# Patient Record
Sex: MALE | Race: WHITE | NOT HISPANIC OR LATINO | Employment: FULL TIME | URBAN - METROPOLITAN AREA
[De-identification: names, ages, dates, MRNs, and addresses within clinical notes are randomized per-mention and may not be internally consistent; named-entity substitution may affect disease eponyms.]

---

## 2017-02-02 LAB — PSA SERPL-MCNC: <0.1 NG/ML

## 2017-02-03 DIAGNOSIS — C61 PROSTATE CANCER (H): Primary | ICD-10-CM

## 2017-02-08 ENCOUNTER — OFFICE VISIT (OUTPATIENT)
Dept: RADIATION ONCOLOGY | Facility: CLINIC | Age: 52
End: 2017-02-08
Attending: RADIOLOGY
Payer: COMMERCIAL

## 2017-02-08 VITALS
WEIGHT: 225 LBS | HEART RATE: 67 BPM | BODY MASS INDEX: 30.08 KG/M2 | DIASTOLIC BLOOD PRESSURE: 62 MMHG | SYSTOLIC BLOOD PRESSURE: 121 MMHG

## 2017-02-08 DIAGNOSIS — C61 MALIGNANT NEOPLASM OF PROSTATE (H): Primary | ICD-10-CM

## 2017-02-08 PROCEDURE — 99212 OFFICE O/P EST SF 10 MIN: CPT | Performed by: RADIOLOGY

## 2017-02-08 NOTE — PROGRESS NOTES
FOLLOW-UP VISIT    Patient Name: Duglas Gao      : 1965     Age: 51 year old        ______________________________________________________________________________     Chief Complaint   Patient presents with     Cancer     Prostates ca: abd/pelvis 7020 cGy completed 13, PSA drawn at [facility] [phone]     /62 mmHg  Pulse 67  Wt 102.059 kg (225 lb)       Pain  Denies    Labs  Other Labs: No    Imaging  None        PSA:   PSA   Date Value Ref Range Status   2017 <0.1 ng/mL Final   2016 <0.1 ng/mL Final   2016 <0.01 0 - 4 ug/L Final   2015 <0.06 ng/mL Final   2015 <0.01 0 - 4 ug/L Final   2014 <0.01 0 - 4 ug/L Final   2014  0 - 4 ug/L Final    <0.07  PSA results are about 7% lower than our prior method due to a methodology change   on 2011.   2011 <0.10 0 - 4 ug/L Final   2010 3.70 0 - 4 ug/L Final     Testosterone Level:   TESTOSTERONE TOTAL   Date Value Ref Range Status   2014 2* 240 - 950 ng/dL Final   2012 429 240 - 950 ng/dL Final   2010 433 241 - 827 ng/dL Final       On-Study AUA Symptom Score (PQ)  AUA (American Urological Association) Symptom Score  1. Over the past month, how often have you had a sensation of not emptying your bladder completely after you finished urinating?: Less than 1 time in 5  2. Over the past month, how often have you had to urinate again less than two hours after you finished urinating?: Less than half the time  3. Over the past month, how often have you found you stopped and started again several times when you urinated?: About half the time  4. Over the past month, how often have you found it difficult to postpone urination?: Not at all  5. Over the past month, how often have you had a weak urine stream?: Less than 1 time in 5  6. Over the past month, how often have you had to push or strain to begin urinating?: About half the time  7. Over the past  month, how many times did you typically get up to urinate from the time you went to bed at night until the time you got up in the morning?: 2 times  AUA Score: 12    Diarrhea: 0- None    Constipation: 0- None        Other Appointments:     MD Name:  Appointment Date:    MD Name: Appointment Date:   MD Name: Appointment Date:   Other Appointment Notes:     Residual Radiation side effect: doing      Additional Instructions:

## 2017-02-08 NOTE — LETTER
2017       RE: Duglas Gao  [address] [place] [address]     Dear Colleague,    Thank you for referring your patient, Duglas Gao, to the RADIATION ONCOLOGY CLINIC. Please see a copy of my visit note below.    FOLLOW-UP VISIT    Patient Name: Duglas Gao      : 1965     Age: 51 year old        ______________________________________________________________________________     Chief Complaint   Patient presents with     Cancer     Prostates ca: abd/pelvis 7020 cGy completed 13, PSA drawn at [facility] [phone]     /62 mmHg  Pulse 67  Wt 102.059 kg (225 lb)       Pain  Denies    Labs  Other Labs: No    Imaging  None        PSA:   PSA   Date Value Ref Range Status   2017 <0.1 ng/mL Final   2016 <0.1 ng/mL Final   2016 <0.01 0 - 4 ug/L Final   2015 <0.06 ng/mL Final   2015 <0.01 0 - 4 ug/L Final   2014 <0.01 0 - 4 ug/L Final   2014  0 - 4 ug/L Final    <0.07  PSA results are about 7% lower than our prior method due to a methodology change   on 2011.   2011 <0.10 0 - 4 ug/L Final   2010 3.70 0 - 4 ug/L Final     Testosterone Level:   TESTOSTERONE TOTAL   Date Value Ref Range Status   2014 2* 240 - 950 ng/dL Final   2012 429 240 - 950 ng/dL Final   2010 433 241 - 827 ng/dL Final       On-Study AUA Symptom Score (PQ)  AUA (American Urological Association) Symptom Score  1. Over the past month, how often have you had a sensation of not emptying your bladder completely after you finished urinating?: Less than 1 time in 5  2. Over the past month, how often have you had to urinate again less than two hours after you finished urinating?: Less than half the time  3. Over the past month, how often have you found you stopped and started again several times when you urinated?: About half the time  4. Over the past month, how often have you found it difficult to postpone  urination?: Not at all  5. Over the past month, how often have you had a weak urine stream?: Less than 1 time in 5  6. Over the past month, how often have you had to push or strain to begin urinating?: About half the time  7. Over the past month, how many times did you typically get up to urinate from the time you went to bed at night until the time you got up in the morning?: 2 times  AUA Score: 12    Diarrhea: 0- None    Constipation: 0- None        Other Appointments:     MD Name:  Appointment Date:    MD Name: Appointment Date:   MD Name: Appointment Date:   Other Appointment Notes:     Residual Radiation side effect: doing      Additional Instructions:                 Radiation Oncology Follow-up Visit  2017    Duglas Gao  MRN: [id number]   : 1965     DISEASE TREATED:pT2b N0 M0, [place] 4+4= 8 adenocarcinoma of the prostate, PSA = 3.7 (pre-op) / 0.34 (pre-RT)    RADIATION THERAPY DELIVERED: 7020 cGy in 39 daily fractions with neoadjuvant and concurrent ADT    INTERVAL SINCE COMPLETION OF RADIATION THERAPY: 3 years and 2 months (completed 2013)    ANDROGEN DEPRIVATION THERAPY:  2013 Lupron 30 mg  2014 Lupron 30 mg  2014 Lupron 30 mg  Lupron discontinued after 2014 injection secondary to side effects    SUBJECTIVE: Duglas Gao is a 50 year old man who underwent a radical prostatectomy in 2010 for treatment of a high-risk prostate cancer. Several years later he unfortunately developed biochemical disease recurrence and he received salvage radiotherapy with neoadjuvant and concurrent ADT as described above. Due to side effects related to his androgen suppression, namely hot flashes and weight gain, he elected to discontinue Lupron after a total of 1 year of ADT. He returns to radiation oncology clinic today for a routine follow-up visit.     On our visit today, Mr. Gao reports that he is doing well and has no pressing concerns or complaints. He  describes stable and satisfactory urinary function. His AUA symptom score today is 12/35 (compared to 11/35 on last visit). He has no residual side effects from ADT and denies hot flashes, sweats, and fatigue. He also denies weight loss, bony pains, or new masses. His remaining ROS are unremarkable.    PHYSICAL EXAM:  /62 mmHg  Pulse 67  Wt 102.059 kg (225 lb)  Gen: Alert, in NAD  Pulm: No wheezing, stridor or respiratory distress  CV: Well-perfused, no cyanosis  Rectal: DAMIEN deferred  Musculoskeletal: Normal bulk and tone   Skin: Normal color and turgor    LABS AND IMAGING:  PSA  2/2/2017 <0.1  8/11/2016 <0.1  2/5/2016 <0.01  7/23/2015 <0.06  2/6/2015 <0.01  8/11/2014 <0.01  2/14/2014 <0.07  8/21/2013 0.34  5/23/2011 <0.10  7/29/2010 3.70    IMPRESSION: Clinically, no evidence of disease with minimal residual toxicity from treatment    PLAN: Follow-up in radiation oncology clinic in 6 months with Dr. Pollock with a repeat PSA    Mr. Gao was seen and discussed with staff, Dr. Pollock.    Esa Srivastava MD PGY-3  Radiation Oncology Resident, [facility]  Phone: [phone]    I saw the patient with the resident.  I agree with the resident's note and plan of care.      OLAYINKA Pollock M.D.  Department of Radiation Oncology  [facility]

## 2017-02-08 NOTE — PROGRESS NOTES
Radiation Oncology Follow-up Visit  2017    Duglas Gao  MRN: [id number]   : 1965     DISEASE TREATED:pT2b N0 M0, [place] 4+4= 8 adenocarcinoma of the prostate, PSA = 3.7 (pre-op) / 0.34 (pre-RT)    RADIATION THERAPY DELIVERED: 7020 cGy in 39 daily fractions with neoadjuvant and concurrent ADT    INTERVAL SINCE COMPLETION OF RADIATION THERAPY: 3 years and 2 months (completed 2013)    ANDROGEN DEPRIVATION THERAPY:  2013 Lupron 30 mg  2014 Lupron 30 mg  2014 Lupron 30 mg  Lupron discontinued after 2014 injection secondary to side effects    SUBJECTIVE: Duglas Gao is a 50 year old man who underwent a radical prostatectomy in 2010 for treatment of a high-risk prostate cancer. Several years later he unfortunately developed biochemical disease recurrence and he received salvage radiotherapy with neoadjuvant and concurrent ADT as described above. Due to side effects related to his androgen suppression, namely hot flashes and weight gain, he elected to discontinue Lupron after a total of 1 year of ADT. He returns to radiation oncology clinic today for a routine follow-up visit.     On our visit today, Mr. Gao reports that he is doing well and has no pressing concerns or complaints. He describes stable and satisfactory urinary function. His AUA symptom score today is 12/35 (compared to 11/35 on last visit). He has no residual side effects from ADT and denies hot flashes, sweats, and fatigue. He also denies weight loss, bony pains, or new masses. His remaining ROS are unremarkable.    PHYSICAL EXAM:  /62 mmHg  Pulse 67  Wt 102.059 kg (225 lb)  Gen: Alert, in NAD  Pulm: No wheezing, stridor or respiratory distress  CV: Well-perfused, no cyanosis  Rectal: DAMIEN deferred  Musculoskeletal: Normal bulk and tone   Skin: Normal color and turgor    LABS AND  IMAGING:  PSA  2/2/2017 <0.1  8/11/2016 <0.1  2/5/2016 <0.01  7/23/2015 <0.06  2/6/2015 <0.01  8/11/2014 <0.01  2/14/2014 <0.07  8/21/2013 0.34  5/23/2011 <0.10  7/29/2010 3.70    IMPRESSION: Clinically, no evidence of disease with minimal residual toxicity from treatment    PLAN: Follow-up in radiation oncology clinic in 6 months with Dr. Pollock with a repeat PSA    Mr. Gao was seen and discussed with staff, Dr. Pollock.    Esa Srivastava MD PGY-3  Radiation Oncology Resident, [facility]  Phone: [phone]    I saw the patient with the resident.  I agree with the resident's note and plan of care.      OLAYINKA Pollock M.D.  Department of Radiation Oncology  [facility]

## 2017-02-08 NOTE — MR AVS SNAPSHOT
After Visit Summary   2017    Duglas Gao    MRN: [id number]           Patient Information     Date Of Birth          1965        Visit Information        Provider Department      2017 3:30 PM Lyn Pollock MD Radiation Oncology Clinic        Today's Diagnoses     Malignant neoplasm of prostate (H)    -  1       Follow-ups after your visit        Who to contact     Please call your clinic at [phone] to:    Ask questions about your health    Make or cancel appointments    Discuss your medicines    Learn about your test results    Speak to your doctor   If you have compliments or concerns about an experience at your clinic, or if you wish to file a complaint, please contact [facility] Patient Relations at [phone] or email us at [email or web address]         Additional Information About Your Visit        MyChart Information     [employer] is an electronic gateway that provides easy, online access to your medical records. With [employer], you can request a clinic appointment, read your test results, renew a prescription or communicate with your care team.     To sign up for ConSentry [employer] visit the website at [email or web address]   You will be asked to enter the access code listed below, as well as some personal information. Please follow the directions to create your username and password.     Your access code is: [email or web address]  Expires: 2017 10:55 AM     Your access code will  in 90 days. If you need help or a new code, please contact your [facility] or call [phone] for assistance.        Care EveryWhere ID     This is your Care EveryWhere ID. This could be used by other organizations to access your [facility] medical records  [id number]        Your Vitals Were     Pulse BMI (Body Mass Index)                67 30.1 kg/m2           Blood Pressure from Last 3 Encounters:   17 121/62    08/16/16 129/51   02/17/16 133/83    Weight from Last 3 Encounters:   02/08/17 102.1 kg (225 lb)   08/16/16 103.5 kg (228 lb 3.2 oz)   02/17/16 105.1 kg (231 lb 9.6 oz)              Today, you had the following     No orders found for display       Primary Care Provider Office Phone # Fax #    Siddhartha Hill [phone]       [facility] [address] [facility] [address]        Thank you!     Thank you for choosing [facility]IC  for your care. Our goal is always to provide you with excellent care. Hearing back from our patients is one way we can continue to improve our services. Please take a few minutes to complete the written survey that you may receive in the mail after your visit with us. Thank you!             Your Updated Medication List - Protect others around you: Learn how to safely use, store and throw away your medicines at www.disposemymeds.org.          This list is accurate as of: 2/8/17 11:59 PM.  Always use your most recent med list.                   Brand Name Dispense Instructions for use    fluticasone 50 MCG/ACT spray    FLONASE    48 g    USE 2 SPRAYS INTO BOTH NOSTRILS DAILY       LIPITOR PO          WELLBUTRIN PO      Take 300 mg by mouth daily

## 2017-08-02 DIAGNOSIS — C61 MALIGNANT NEOPLASM OF PROSTATE (H): Primary | ICD-10-CM

## 2017-08-14 ENCOUNTER — OFFICE VISIT (OUTPATIENT)
Dept: RADIATION ONCOLOGY | Facility: CLINIC | Age: 52
End: 2017-08-14
Attending: RADIOLOGY
Payer: COMMERCIAL

## 2017-08-14 VITALS
OXYGEN SATURATION: 95 % | DIASTOLIC BLOOD PRESSURE: 89 MMHG | WEIGHT: 224 LBS | BODY MASS INDEX: 29.96 KG/M2 | HEART RATE: 63 BPM | SYSTOLIC BLOOD PRESSURE: 142 MMHG

## 2017-08-14 DIAGNOSIS — C61 PROSTATE CANCER (H): Primary | ICD-10-CM

## 2017-08-14 PROCEDURE — 99212 OFFICE O/P EST SF 10 MIN: CPT | Performed by: RADIOLOGY

## 2017-08-14 ASSESSMENT — PAIN SCALES - GENERAL: PAINLEVEL: NO PAIN (0)

## 2017-08-14 NOTE — LETTER
2017       RE: Duglas Gao  [address] [place] [address]     Dear Colleague,    Thank you for referring your patient, Duglas Gao, to the RAD[facility]ATION ONCOLOGY CLINIC. Please see a copy of my visit note below.    FOLLOW-UP VISIT    Patient Name: Duglas Gao      : 1965     Age: 52 year old        ______________________________________________________________________________     Chief Complaint   Patient presents with     Cancer     Follow up for Prostates ca: abd/pelvis 7020 cGy completed 13     /89  Pulse 63  Wt 101.6 kg (224 lb)  SpO2 95%  BMI 29.96 kg/m2     Date Radiation Completed: 13    Pain  Denies    Labs  Other Labs: Yes: 17    Imaging  None        PSA:   PSA   Date Value Ref Range Status   2017 <0.1 ng/mL Final   2016 <0.1 ng/mL Final   2016 <0.01 0 - 4 ug/L Final   2015 <0.06 ng/mL Final   2015 <0.01 0 - 4 ug/L Final   2014 <0.01 0 - 4 ug/L Final   2014  0 - 4 ug/L Final    <0.07  PSA results are about 7% lower than our prior method due to a methodology change   on 2011.   2011 <0.10 0 - 4 ug/L Final   2010 3.70 0 - 4 ug/L Final     Testosterone Level:   Testosterone Total   Date Value Ref Range Status   2014 2 (L) 240 - 950 ng/dL Final   2012 429 240 - 950 ng/dL Final   2010 433 241 - 827 ng/dL Final       On-Study AUA Symptom Score (PQ)  AUA (American Urological Association) Symptom Score  1. Over the past month, how often have you had a sensation of not emptying your bladder completely after you finished urinating?: Less than 1 time in 5  2. Over the past month, how often have you had to urinate again less than two hours after you finished urinating?: Less than half the time  3. Over the past month, how often have you found you stopped and started again several times when you urinated?: Less than half the time  4. Over the past month, how often have  you found it difficult to postpone urination?: Not at all  5. Over the past month, how often have you had a weak urine stream?: Less than half the time  6. Over the past month, how often have you had to push or strain to begin urinating?: Less than 1 time in 5  7. Over the past month, how many times did you typically get up to urinate from the time you went to bed at night until the time you got up in the morning?: 1 time  AUA Score: 9    Diarrhea: 0- None    Constipation: 0- None        Other Appointments:     MD Name:  Appointment Date:    MD Name: Appointment Date:   MD Name: Appointment Date:   Other Appointment Notes:     Residual Radiation side effect:  Feels like he needs to go to the bathroom when sits too long    Additional Instructions:     Nurse face-to-face time: Level 2:  5 min face to face time        Radiation Oncology Follow-up Visit  2017    Duglas Gao  MRN: [id number]   : 1965     DISEASE TREATED: pT2b N0 M0, [place] 4+4= 8 adenocarcinoma of the prostate, PSA = 3.7 (pre-op) / 0.34 (pre-RT)    RADIATION THERAPY DELIVERED: 7020 cGy in 39 daily fractions with neoadjuvant and concurrent ADT    INTERVAL SINCE COMPLETION OF RADIATION THERAPY: 3 years and 8 months (completed 2013)    ANDROGEN DEPRIVATION THERAPY:  2013 Lupron 30 mg  2014 Lupron 30 mg  2014 Lupron 30 mg  Lupron discontinued after 2014 injection secondary to side effects    SUBJECTIVE: Duglas Gao is a 50 year old man who underwent a radical prostatectomy in 2010 for treatment of a high-risk prostate cancer. Several years later he unfortunately developed biochemical disease recurrence and he received salvage radiotherapy with neoadjuvant and concurrent ADT, as described above. Due to side effects related to his androgen suppression, namely hot flashes and weight gain, he elected to discontinue Lupron after a total of 1 year of ADT. He returns to radiation oncology clinic today  for a routine follow-up visit.     On our visit today, Mr. Gao reports that he is doing well, although he continues to struggle with erectile dysfunction. He is interested in seeing Urology services again to discuss the initialization of penile injections or other escalated measures, as he has already failed cialis. His AUA symptom score today is 9/35 (compared to 12/35 at last visit). He has no residual side effects from ADT and denies hot flashes, sweats, and fatigue. He also denies weight loss, bony pains, or new masses. His remaining ROS are unremarkable.    PHYSICAL EXAM:  /89  Pulse 63  Wt 101.6 kg (224 lb)  SpO2 95%  BMI 29.96 kg/m2  Gen: Alert, in NAD  Pulm: No wheezing, stridor or respiratory distress  CV: Well-perfused, no cyanosis  Rectal: DAMIEN deferred  Musculoskeletal: Normal bulk and tone   Skin: Normal color and turgor    LABS AND IMAGING:  PSA  8/8/2017 <0.1  2/2/2017 <0.1  8/11/2016 <0.1  2/5/2016 <0.01  7/23/2015 <0.06  2/6/2015 <0.01  8/11/2014 <0.01  2/14/2014 <0.07  8/21/2013 0.34  5/23/2011 <0.10  7/29/2010 3.70    IMPRESSION: Clinically, no evidence of disease. Minimal residual toxicity from treatment    PLAN: Follow-up in radiation oncology clinic in 6 months with Dr. Pollock. Repeat PSA prior to visit. He was asked to contact the Urology Clinic directly to address his persistent erectile dysfunction.    Mr. Gao was seen and discussed with staff, Dr. Pollock.    Niranjan Morrison MD-PhD PGY-2  Radiation Oncology Resident, [facility]    I saw the patient with the resident.  I agree with the resident's note and plan of care.      OLAYINKA Pollock M.D.  Department of Radiation Oncology  [facility]

## 2017-08-14 NOTE — MR AVS SNAPSHOT
After Visit Summary   2017    Duglas Gao    MRN: [id number]           Patient Information     Date Of Birth          1965        Visit Information        Provider Department      2017 10:00 AM Lyn Pollock MD Radiation Oncology Clinic        Today's Diagnoses     Prostate cancer (H)    -  1       Follow-ups after your visit        Follow-up notes from your care team     Return in about 6 months (around 2018).      Who to contact     Please call your clinic at [phone] to:    Ask questions about your health    Make or cancel appointments    Discuss your medicines    Learn about your test results    Speak to your doctor   If you have compliments or concerns about an experience at your clinic, or if you wish to file a complaint, please contact [facility] Patient Relations at [phone] or email us at [email or web address]         Additional Information About Your Visit        MyChart Information     [employer] is an electronic gateway that provides easy, online access to your medical records. With [employer], you can request a clinic appointment, read your test results, renew a prescription or communicate with your care team.     To sign up for [employer] visit the website at [email or web address]   You will be asked to enter the access code listed below, as well as some personal information. Please follow the directions to create your username and password.     Your access code is: [email or web address]  Expires: 2017  7:25 AM     Your access code will  in 90 days. If you need help or a new code, please contact your [facility] Clinic or call [phone] for assistance.        Care EveryWhere ID     This is your Care EveryWhere ID. This could be used by other organizations to access your [place] medical records  [id number]        Your Vitals Were     Pulse Pulse Oximetry BMI (Body Mass Index)              63 95% 29.96 kg/m2          Blood Pressure from Last 3 Encounters:   08/14/17 142/89   02/08/17 121/62   08/16/16 129/51    Weight from Last 3 Encounters:   08/14/17 101.6 kg (224 lb)   02/08/17 102.1 kg (225 lb)   08/16/16 103.5 kg (228 lb 3.2 oz)               Primary Care Provider Office Phone # Fax #    Siddhartha Hill [phone]       [facility] [address] [facility] [address]        Equal Access to Services     TISHA [facility] Hospital[facility]AMARI : Hadii aad ku hadasho Soria, waaxda luqadaha, qaybta kaalmada adezainab, arpil schulz . So [facility] [phone].    ATENCIÓN: Si habla español, tiene a avila disposición servicios gratuitos de asistencia lingüística. [facility] [phone].    We comply with applicable federal civil rights laws and [place] laws. We do not discriminate on the basis of race, color, national origin, age, disability sex, sexual orientation or gender identity.            Thank you!     Thank you for choosing [facility]  for your care. Our goal is always to provide you with excellent care. Hearing back from our patients is one way we can continue to improve our services. Please take a few minutes to complete the written survey that you may receive in the mail after your visit with us. Thank you!             Your Updated Medication List - Protect others around you: Learn how to safely use, store and throw away your medicines at www.disposemymeds.org.          This list is accurate as of: 8/14/17 11:59 PM.  Always use your most recent med list.                   Brand Name Dispense Instructions for use Diagnosis    fluticasone 50 MCG/ACT spray    FLONASE    48 g    USE 2 SPRAYS INTO BOTH NOSTRILS DAILY    Allergic rhinitis, unspecified       LIPITOR PO           WELLBUTRIN PO      Take 300 mg by mouth daily

## 2017-08-14 NOTE — PROGRESS NOTES
FOLLOW-UP VISIT    Patient Name: Duglas Gao      : 1965     Age: 52 year old        ______________________________________________________________________________     Chief Complaint   Patient presents with     Cancer     Follow up for Prostates ca: abd/pelvis 7020 cGy completed 13     /89  Pulse 63  Wt 101.6 kg (224 lb)  SpO2 95%  BMI 29.96 kg/m2     Date Radiation Completed: 13    Pain  Denies    Labs  Other Labs: Yes: 17    Imaging  None        PSA:   PSA   Date Value Ref Range Status   2017 <0.1 ng/mL Final   2016 <0.1 ng/mL Final   2016 <0.01 0 - 4 ug/L Final   2015 <0.06 ng/mL Final   2015 <0.01 0 - 4 ug/L Final   2014 <0.01 0 - 4 ug/L Final   2014  0 - 4 ug/L Final    <0.07  PSA results are about 7% lower than our prior method due to a methodology change   on 2011.   2011 <0.10 0 - 4 ug/L Final   2010 3.70 0 - 4 ug/L Final     Testosterone Level:   Testosterone Total   Date Value Ref Range Status   2014 2 (L) 240 - 950 ng/dL Final   2012 429 240 - 950 ng/dL Final   2010 433 241 - 827 ng/dL Final       On-Study AUA Symptom Score (PQ)  AUA (American Urological Association) Symptom Score  1. Over the past month, how often have you had a sensation of not emptying your bladder completely after you finished urinating?: Less than 1 time in 5  2. Over the past month, how often have you had to urinate again less than two hours after you finished urinating?: Less than half the time  3. Over the past month, how often have you found you stopped and started again several times when you urinated?: Less than half the time  4. Over the past month, how often have you found it difficult to postpone urination?: Not at all  5. Over the past month, how often have you had a weak urine stream?: Less than half the time  6. Over the past month, how often have you had to push or strain to begin urinating?: Less  than 1 time in 5  7. Over the past month, how many times did you typically get up to urinate from the time you went to bed at night until the time you got up in the morning?: 1 time  AUA Score: 9    Diarrhea: 0- None    Constipation: 0- None        Other Appointments:     MD Name:  Appointment Date:    MD Name: Appointment Date:   MD Name: Appointment Date:   Other Appointment Notes:     Residual Radiation side effect:  Feels like he needs to go to the bathroom when sits too long    Additional Instructions:     Nurse face-to-face time: Level 2:  5 min face to face time

## 2017-08-14 NOTE — PROGRESS NOTES
Radiation Oncology Follow-up Visit  2017    Duglas Gao  MRN: [id number]   : 1965     DISEASE TREATED: pT2b N0 M0, [place] 4+4= 8 adenocarcinoma of the prostate, PSA = 3.7 (pre-op) / 0.34 (pre-RT)    RADIATION THERAPY DELIVERED: 7020 cGy in 39 daily fractions with neoadjuvant and concurrent ADT    INTERVAL SINCE COMPLETION OF RADIATION THERAPY: 3 years and 8 months (completed 2013)    ANDROGEN DEPRIVATION THERAPY:  2013 Lupron 30 mg  2014 Lupron 30 mg  2014 Lupron 30 mg  Lupron discontinued after 2014 injection secondary to side effects    SUBJECTIVE: Duglas Gao is a 50 year old man who underwent a radical prostatectomy in 2010 for treatment of a high-risk prostate cancer. Several years later he unfortunately developed biochemical disease recurrence and he received salvage radiotherapy with neoadjuvant and concurrent ADT, as described above. Due to side effects related to his androgen suppression, namely hot flashes and weight gain, he elected to discontinue Lupron after a total of 1 year of ADT. He returns to radiation oncology clinic today for a routine follow-up visit.     On our visit today, Mr. Gao reports that he is doing well, although he continues to struggle with erectile dysfunction. He is interested in seeing Urology services again to discuss the initialization of penile injections or other escalated measures, as he has already failed cialis. His AUA symptom score today is 9/35 (compared to 12/35 at last visit). He has no residual side effects from ADT and denies hot flashes, sweats, and fatigue. He also denies weight loss, bony pains, or new masses. His remaining ROS are unremarkable.    PHYSICAL EXAM:  /89  Pulse 63  Wt 101.6 kg (224 lb)  SpO2 95%  BMI 29.96 kg/m2  Gen: Alert, in NAD  Pulm: No wheezing, stridor or respiratory distress  CV: Well-perfused, no cyanosis  Rectal: DAMIEN deferred  Musculoskeletal: Normal bulk and tone    Skin: Normal color and turgor    LABS AND IMAGING:  PSA  8/8/2017 <0.1  2/2/2017 <0.1  8/11/2016 <0.1  2/5/2016 <0.01  7/23/2015 <0.06  2/6/2015 <0.01  8/11/2014 <0.01  2/14/2014 <0.07  8/21/2013 0.34  5/23/2011 <0.10  7/29/2010 3.70    IMPRESSION: Clinically, no evidence of disease. Minimal residual toxicity from treatment    PLAN: Follow-up in radiation oncology clinic in 6 months with Dr. Pollock. Repeat PSA prior to visit. He was asked to contact the Urology Clinic directly to address his persistent erectile dysfunction.    Mr. Gao was seen and discussed with staff, Dr. Pollock.    Niranjan Morrison MD-PhD PGY-2  Radiation Oncology Resident, [facility]    I saw the patient with the resident.  I agree with the resident's note and plan of care.      OLAYINKA Pollock M.D.  Department of Radiation Oncology  [facility]

## 2018-02-21 ENCOUNTER — OFFICE VISIT (OUTPATIENT)
Dept: RADIATION ONCOLOGY | Facility: CLINIC | Age: 53
End: 2018-02-21
Attending: RADIOLOGY
Payer: COMMERCIAL

## 2018-02-21 VITALS
WEIGHT: 224.6 LBS | HEART RATE: 64 BPM | OXYGEN SATURATION: 96 % | SYSTOLIC BLOOD PRESSURE: 153 MMHG | DIASTOLIC BLOOD PRESSURE: 91 MMHG | BODY MASS INDEX: 30.04 KG/M2

## 2018-02-21 DIAGNOSIS — C61 PROSTATE CANCER (H): Primary | ICD-10-CM

## 2018-02-21 LAB — PSA SERPL-MCNC: <0.1 NG/ML

## 2018-02-21 PROCEDURE — G0463 HOSPITAL OUTPT CLINIC VISIT: HCPCS | Performed by: RADIOLOGY

## 2018-02-21 NOTE — PROGRESS NOTES
FOLLOW-UP VISIT    Patient Name: Duglas Gao      : 1965     Age: 52 year old        ______________________________________________________________________________     Chief Complaint   Patient presents with     Cancer     F/U for radiaiton to the pelvis     BP (!) 153/91  Pulse 64  Wt 101.9 kg (224 lb 9.6 oz)  SpO2 96%  BMI 30.04 kg/m2     Date Radiation Completed: Prostates ca: abd/pelvis 7020 cGy completed 13    Pain  Denies    Labs  Other Labs: Yes: [facility] clinic 18    Imaging  None        PSA:   PSA   Date Value Ref Range Status   2017 <0.1 ng/mL Final   2016 <0.1 ng/mL Final   2016 <0.01 0 - 4 ug/L Final   2015 <0.06 ng/mL Final   2015 <0.01 0 - 4 ug/L Final   2014 <0.01 0 - 4 ug/L Final   2014  0 - 4 ug/L Final    <0.07  PSA results are about 7% lower than our prior method due to a methodology change   on 2011.   2011 <0.10 0 - 4 ug/L Final   2010 3.70 0 - 4 ug/L Final     Testosterone Level:   Testosterone Total   Date Value Ref Range Status   2014 2 (L) 240 - 950 ng/dL Final   2012 429 240 - 950 ng/dL Final   2010 433 241 - 827 ng/dL Final       On-Study AUA Symptom Score (PQ)  AUA (American Urological Association) Symptom Score  1. Over the past month, how often have you had a sensation of not emptying your bladder completely after you finished urinating?: Less than 1 time in 5  2. Over the past month, how often have you had to urinate again less than two hours after you finished urinating?: Less than half the time  3. Over the past month, how often have you found you stopped and started again several times when you urinated?: More than half the time  4. Over the past month, how often have you found it difficult to postpone urination?: Not at all  5. Over the past month, how often have you had a weak urine stream?: About half the time  6. Over the past month, how often have you had to  push or strain to begin urinating?: More than half the time  7. Over the past month, how many times did you typically get up to urinate from the time you went to bed at night until the time you got up in the morning?: 1 time  AUA Score: 15    Diarrhea: 0- None    Constipation: 1- Occassional or intermittent s/s; occasional use of stool softners, laxatives, enema       Other Appointments: No    MD Name:  Appointment Date:    MD Name: Appointment Date:   MD Name: Appointment Date:   Other Appointment Notes:     Residual Radiation side effect: Feeling well r/t radiation.  A few months ago noticed changes in urination. Weak stream, some start and stopping while urinating. Rare leakage.     Additional Instructions:     Nurse face-to-face time: Level 2:  5 min face to face time

## 2018-02-21 NOTE — LETTER
2018       RE: Duglas Gao  [address] [place] [address]     Dear Colleague,    Thank you for referring your patient, Duglas Gao, to the RADIATION ONCOLOGY CLINIC. Please see a copy of my visit note below.    FOLLOW-UP VISIT    Patient Name: Duglas Gao      : 1965     Age: 52 year old        ______________________________________________________________________________     Chief Complaint   Patient presents with     Cancer     F/U for radiaiton to the pelvis     BP (!) 153/91  Pulse 64  Wt 101.9 kg (224 lb 9.6 oz)  SpO2 96%  BMI 30.04 kg/m2     Date Radiation Completed: Prostates ca: abd/pelvis 7020 cGy completed 13    Pain  Denies    Labs  Other Labs: Yes: [facility] clinic 18    Imaging  None        PSA:   PSA   Date Value Ref Range Status   2017 <0.1 ng/mL Final   2016 <0.1 ng/mL Final   2016 <0.01 0 - 4 ug/L Final   2015 <0.06 ng/mL Final   2015 <0.01 0 - 4 ug/L Final   2014 <0.01 0 - 4 ug/L Final   2014  0 - 4 ug/L Final    <0.07  PSA results are about 7% lower than our prior method due to a methodology change   on 2011.   2011 <0.10 0 - 4 ug/L Final   2010 3.70 0 - 4 ug/L Final     Testosterone Level:   Testosterone Total   Date Value Ref Range Status   2014 2 (L) 240 - 950 ng/dL Final   2012 429 240 - 950 ng/dL Final   2010 433 241 - 827 ng/dL Final       On-Study AUA Symptom Score (PQ)  AUA (American Urological Association) Symptom Score  1. Over the past month, how often have you had a sensation of not emptying your bladder completely after you finished urinating?: Less than 1 time in 5  2. Over the past month, how often have you had to urinate again less than two hours after you finished urinating?: Less than half the time  3. Over the past month, how often have you found you stopped and started again several times when you urinated?: More than half the  time  4. Over the past month, how often have you found it difficult to postpone urination?: Not at all  5. Over the past month, how often have you had a weak urine stream?: About half the time  6. Over the past month, how often have you had to push or strain to begin urinating?: More than half the time  7. Over the past month, how many times did you typically get up to urinate from the time you went to bed at night until the time you got up in the morning?: 1 time  AUA Score: 15    Diarrhea: 0- None    Constipation: 1- Occassional or intermittent s/s; occasional use of stool softners, laxatives, enema       Other Appointments: No    MD Name:  Appointment Date:    MD Name: Appointment Date:   MD Name: Appointment Date:   Other Appointment Notes:     Residual Radiation side effect: Feeling well r/t radiation.  A few months ago noticed changes in urination. Weak stream, some start and stopping while urinating. Rare leakage.     Additional Instructions:     Nurse face-to-face time: Level 2:  5 min face to face time          RADIATION ONCOLOGY FOLLOW-UP VISIT  DATE: Feb 21, 2018    NAME: Duglas Gao  MRN: [id number]    DISEASE TREATED: pT2b N0 M0, [place] 4+4= 8 adenocarcinoma of the prostate, PSA = 3.7 (pre-op) / 0.34 (pre-RT)    RADIATION THERAPY DELIVERED: Salvage RT: 7020 cGy in 39 daily fractions with neoadjuvant and concurrent ADT    INTERVAL SINCE COMPLETION OF RT: 4 years, 2 months since completion on 12/17/13    ANDROGEN DEPRIVATION THERAPY:  9/12/2013                    Lupron 30 mg  1/20/2014                    Lupron 30 mg  5/20/2014                    Lupron 30 mg  Lupron discontinued after 5/20/2014 injection secondary to side effects    SUBJECTIVE:  Duglas Gao is a 52 year old man who is s/p radical prostatectomy in 12/2010 and salvage radiotherapy for high-risk prostate cancer per above. He received salvage radiation with neoadjuvant and concurrent ADT as described above. Due to side  effects of hot flashes and weight gain he discontinued Lupron after a total of 1 year of ADT.     He returns to our clinic today for a routine follow-up visit. He reports that he is doing well, although he continues to struggle with urinary symptoms. His AUA symptom score today is 15 (emptying 1, frequency 2, starting and stopping 4, postponing 0, weak stream 3, straining 4), which feels much worse to him than at last visit when his AUA symptom score was 9/35.  He is interested in seeing Urology regarding this issue. He does Kegel exercises regularly and has no issues with holding his urine.     He still has erectile dysfunction - he is able to have night time erections but is not able to have intercourse and cialis and viagra have not helped with this symptom at all. He also has no residual side effects from ADT and denies hot flashes, sweats, and fatigue. He also denies weight loss, bony pains, or new masses. His remaining ROS are unremarkable.    PHYSICAL EXAM:  VITALS: BP (!) 153/91  Pulse 64  Wt 101.9 kg (224 lb 9.6 oz)  SpO2 96%  BMI 30.04 kg/m2  GEN: Appears well, alert, oriented, and in NAD  RECTAL: deferred  NEURO: No focal deficits, normal gait  PSYCH: Appropriate mood and affect    LABS AND IMAGING: Reviewed    PSA   Date Value Ref Range Status   02/09/2018 <0.1 ng/mL Final   02/02/2017 <0.1 ng/mL Final   08/11/2016 <0.1 ng/mL Final   02/05/2016 <0.01 0 - 4 ug/L Final   07/23/2015 <0.06 ng/mL Final   02/06/2015 <0.01 0 - 4 ug/L Final   08/11/2014 <0.01 0 - 4 ug/L Final   02/14/2014 <0.07 0 - 4 ug/L Final   05/23/2011 <0.10 0 - 4 ug/L Final   07/29/2010 3.70 0 - 4 ug/L Final     IMPRESSION:   Mr. Gao is a 52 year old male with high risk prostate cancer s/p salvage radiotherapy and ADT who continues to have no evidence of recurrence. However, he is having worse urinary symptoms related to straining and strength of his urinary stream than at last visit, suspicious for a urethral  stricture.    PLAN:  1. Referral to urology to evaluate for and treat a possible urethral stricture.  2. RTC in 6 months with repeat PSA.     Mr. Gao was seen and discussed with staff, Dr. Pollock.    Rosa Saenz MD  PGY-2 Radiation Oncology Resident  [facility]  Clinic: [phone]    I saw the patient with the resident.  I agree with the resident's note and plan of care.      OLAYINKA Pollock M.D.  Department of Radiation Oncology  [facility]

## 2018-02-21 NOTE — MR AVS SNAPSHOT
After Visit Summary   2/21/2018    Duglas Gao    MRN: [id number]           Patient Information     Date Of Birth          1965        Visit Information        Provider Department      2/21/2018 3:30 PM Lyn Pollock MD Radiation Oncology Clinic        Today's Diagnoses     Prostate cancer (H)    -  1       Follow-ups after your visit        Additional Services     UROLOGY ADULT REFERRAL       Your provider has referred you to: [facility] [place] [facility] Urologic C[facility] - [place] [phone]   [email or web address]    Please be aware that coverage of these services is subject to the terms and limitations of your health insurance plan.  Call member services at your health plan with any benefit or coverage questions.      Please bring the following with you to your appointment:    (1) Any X-Rays, CTs or MRIs which have been performed.  Contact the facility where they were done to arrange for  prior to your scheduled appointment.    (2) List of current medications  (3) This referral request   (4) Any documents/labs given to you for this referral                  Who to contact     Please call your clinic at [phone] to:    Ask questions about your health    Make or cancel appointments    Discuss your medicines    Learn about your test results    Speak to your doctor            Additional Information About Your Visit        MyChart Information     [employer] is an electronic gateway that provides easy, online access to your medical records. With [employer], you can request a clinic appointment, read your test results, renew a prescription or communicate with your care team.     To sign up for [employer] visit the website at [email or web address]   You will be asked to enter the access code listed below, as well as some personal information. Please follow the directions to create your username and password.     Your access code is: [email or web address]  Expires:  2018  5:43 PM     Your access code will  in 90 days. If you need help or a new code, please contact your [facility] or call [phone] for assistance.        Care EveryWhere ID     This is your Care EveryWhere ID. This could be used by other organizations to access your [place] medical records  [id number]        Your Vitals Were     Pulse Pulse Oximetry BMI (Body Mass Index)             64 96% 30.04 kg/m2          Blood Pressure from Last 3 Encounters:   18 (!) 153/91   17 142/89   17 121/62    Weight from Last 3 Encounters:   18 101.9 kg (224 lb 9.6 oz)   17 101.6 kg (224 lb)   17 102.1 kg (225 lb)              We Performed the Following     PSA tumor marker     UROLOGY ADULT REFERRAL        Primary Care Provider Office Phone # Fax #    Siddhartha Hill [phone]       [facility] [address] [facility] [address]        Equal Access to Services     TISHA PINA : Hadii aad ku hadasho Soomaali, waaxda luqadaha, qaybta kaalmada adezainab, april schulz . So [facility] [phone].    ATENCIÓN: Si habla español, tiene a avila disposición servicios gratuitos de asistencia lingüística. [facility] [phone].    We comply with applicable federal civil rights laws and [place] laws. We do not discriminate on the basis of race, color, national origin, age, disability, sex, sexual orientation, or gender identity.            Thank you!     Thank you for choosing RADIATION ONCOLOGY CLINIC  for your care. Our goal is always to provide you with excellent care. Hearing back from our patients is one way we can continue to improve our services. Please take a few minutes to complete the written survey that you may receive in the mail after your visit with us. Thank you!             Your Updated Medication List - Protect others around you: Learn how to safely use, store and throw away your  medicines at www.disposemymeds.org.          This list is accurate as of 2/21/18 11:59 PM.  Always use your most recent med list.                   Brand Name Dispense Instructions for use Diagnosis    fluticasone 50 MCG/ACT spray    FLONASE    48 g    USE 2 SPRAYS INTO BOTH NOSTRILS DAILY    Allergic rhinitis, unspecified       LIPITOR PO           WELLBUTRIN PO      Take 300 mg by mouth daily

## 2018-02-22 NOTE — PROGRESS NOTES
RADIATION ONCOLOGY FOLLOW-UP VISIT  DATE: Feb 21, 2018    NAME: Duglas Gao  MRN: [id number]    DISEASE TREATED: pT2b N0 M0, [place] 4+4= 8 adenocarcinoma of the prostate, PSA = 3.7 (pre-op) / 0.34 (pre-RT)    RADIATION THERAPY DELIVERED: Salvage RT: 7020 cGy in 39 daily fractions with neoadjuvant and concurrent ADT    INTERVAL SINCE COMPLETION OF RT: 4 years, 2 months since completion on 12/17/13    ANDROGEN DEPRIVATION THERAPY:  9/12/2013                    Lupron 30 mg  1/20/2014                    Lupron 30 mg  5/20/2014                    Lupron 30 mg  Lupron discontinued after 5/20/2014 injection secondary to side effects    SUBJECTIVE:  Duglas Gao is a 52 year old man who is s/p radical prostatectomy in 12/2010 and salvage radiotherapy for high-risk prostate cancer per above. He received salvage radiation with neoadjuvant and concurrent ADT as described above. Due to side effects of hot flashes and weight gain he discontinued Lupron after a total of 1 year of ADT.     He returns to our clinic today for a routine follow-up visit. He reports that he is doing well, although he continues to struggle with urinary symptoms. His AUA symptom score today is 15 (emptying 1, frequency 2, starting and stopping 4, postponing 0, weak stream 3, straining 4), which feels much worse to him than at last visit when his AUA symptom score was 9/35.  He is interested in seeing Urology regarding this issue. He does Kegel exercises regularly and has no issues with holding his urine.     He still has erectile dysfunction - he is able to have night time erections but is not able to have intercourse and cialis and viagra have not helped with this symptom at all. He also has no residual side effects from ADT and denies hot flashes, sweats, and fatigue. He also denies weight loss, bony pains, or new masses. His remaining ROS are unremarkable.    PHYSICAL EXAM:  VITALS: BP (!) 153/91  Pulse 64  Wt 101.9 kg (224 lb 9.6 oz)   SpO2 96%  BMI 30.04 kg/m2  GEN: Appears well, alert, oriented, and in NAD  RECTAL: deferred  NEURO: No focal deficits, normal gait  PSYCH: Appropriate mood and affect    LABS AND IMAGING: Reviewed    PSA   Date Value Ref Range Status   02/09/2018 <0.1 ng/mL Final   02/02/2017 <0.1 ng/mL Final   08/11/2016 <0.1 ng/mL Final   02/05/2016 <0.01 0 - 4 ug/L Final   07/23/2015 <0.06 ng/mL Final   02/06/2015 <0.01 0 - 4 ug/L Final   08/11/2014 <0.01 0 - 4 ug/L Final   02/14/2014 <0.07 0 - 4 ug/L Final   05/23/2011 <0.10 0 - 4 ug/L Final   07/29/2010 3.70 0 - 4 ug/L Final     IMPRESSION:   Mr. Gao is a 52 year old male with high risk prostate cancer s/p salvage radiotherapy and ADT who continues to have no evidence of recurrence. However, he is having worse urinary symptoms related to straining and strength of his urinary stream than at last visit, suspicious for a urethral stricture.    PLAN:  1. Referral to urology to evaluate for and treat a possible urethral stricture.  2. RTC in 6 months with repeat PSA.     Mr. Gao was seen and discussed with staff, Dr. Pollock.    Rosa Saenz MD  PGY-2 Radiation Oncology Resident  [facility]  Clinic: [phone]    I saw the patient with the resident.  I agree with the resident's note and plan of care.      OLAYINKA Pollock M.D.  Department of Radiation Oncology  [facility]

## 2018-02-27 DIAGNOSIS — C61 PROSTATE CANCER (H): Primary | ICD-10-CM

## 2018-02-27 DIAGNOSIS — N35.919 URETHRAL STRICTURE: Primary | ICD-10-CM

## 2018-04-17 ENCOUNTER — PRE VISIT (OUTPATIENT)
Dept: UROLOGY | Facility: CLINIC | Age: 53
End: 2018-04-17

## 2018-04-17 NOTE — TELEPHONE ENCOUNTER
Reason for visit: Urethral stricture consult     Relevant information: pt has a history of prostate cancer and radiation    Records/imaging/labs: all records available    Pt called: Yes, message left asking pt to come to the clinic with a full bladder    Rooming: Flow/pvr

## 2018-04-23 ENCOUNTER — HOSPITAL ENCOUNTER (OUTPATIENT)
Dept: GENERAL RADIOLOGY | Facility: CLINIC | Age: 53
Discharge: HOME | End: 2018-04-23
Attending: UROLOGY | Admitting: UROLOGY
Payer: COMMERCIAL

## 2018-04-23 ENCOUNTER — OFFICE VISIT (OUTPATIENT)
Dept: UROLOGY | Facility: CLINIC | Age: 53
End: 2018-04-23
Payer: COMMERCIAL

## 2018-04-23 ENCOUNTER — HOSPITAL ENCOUNTER (OUTPATIENT)
Dept: GENERAL RADIOLOGY | Facility: CLINIC | Age: 53
End: 2018-04-23
Attending: UROLOGY
Payer: COMMERCIAL

## 2018-04-23 ENCOUNTER — TELEPHONE (OUTPATIENT)
Dept: UROLOGY | Facility: CLINIC | Age: 53
End: 2018-04-23

## 2018-04-23 VITALS
WEIGHT: 223.9 LBS | DIASTOLIC BLOOD PRESSURE: 80 MMHG | HEART RATE: 69 BPM | BODY MASS INDEX: 29.95 KG/M2 | SYSTOLIC BLOOD PRESSURE: 129 MMHG

## 2018-04-23 DIAGNOSIS — N35.919 URETHRAL STRICTURE: ICD-10-CM

## 2018-04-23 PROCEDURE — 74455 X-RAY URETHRA/BLADDER: CPT

## 2018-04-23 PROCEDURE — 74450 X-RAY URETHRA/BLADDER: CPT | Mod: XU

## 2018-04-23 PROCEDURE — 25000125 ZZHC RX 250: Performed by: UROLOGY

## 2018-04-23 PROCEDURE — 25500064 ZZH RX 255 OP 636: Performed by: UROLOGY

## 2018-04-23 RX ADMIN — IOTHALAMATE MEGLUMINE 10 ML: 600 INJECTION INTRAVASCULAR at 13:39

## 2018-04-23 RX ADMIN — LIDOCAINE HYDROCHLORIDE 5 ML: 20 JELLY TOPICAL at 13:38

## 2018-04-23 ASSESSMENT — PAIN SCALES - GENERAL: PAINLEVEL: NO PAIN (0)

## 2018-04-23 NOTE — LETTER
4/23/2018       RE: Duglas Gao  [address] [place] [address]     Dear Colleague,    Thank you for referring your patient, Duglas Gao, to the [facility] UROLOGY AND INST FOR PROSTATE AND UROLOGIC CANCERS at [facility]. Please see a copy of my visit note below.    UROLOGY CONSULT HISTORY & PHYSICAL    Name: Duglas Gao    MRN: [id number]   YOB: 1965         CHIEF COMPLAINT:  Urethral stricture    HISTORY OF PRESENT ILLNESS:  Mr. Gao is a 52 year old-year-old man seen in consultation from Dr. Pollock for possible urethral stricture. He has a history of prostate cancer for which he underwent RALP in 2010. He subsequently underwent salvage XRT which was competed in 12/2013. Since that time he has had gradually worsening urinary symptoms which include hesitancy, straining, and weak stream. He has occasional urgency and frequency but this is less bothersome. He has very minimal incontinence and only in very cold weather.     He does not have a history of self dilation. He currently does not perform self dilation. He does not currently have an indwelling urethral catheter.    Etiology:  He denies a history of sexually transmitted diseases. He denies a history of straddle injury. He denies a history of pelvic fracture. He confirms a history of urethral catheterization or instrumentation.    REVIEW OF QUESTIONNAIRES:  Questionnaires reviewed. See flowsheet for details.    Past Medical History:   Diagnosis Date     Allergic rhinitis, cause unspecified      Prostate cancer (H)        Past Surgical History:   Procedure Laterality Date     DAVINCI PROSTATECTOMY         Current Outpatient Prescriptions   Medication     Atorvastatin Calcium (LIPITOR PO)     BuPROPion HCl (WELLBUTRIN PO)     fluticasone (FLONASE) 50 MCG/ACT nasal spray     Ibuprofen (ADVIL PO)     No current facility-administered medications for this visit.        Allergies as of  04/23/2018 - Felipe as Reviewed 04/23/2018   Allergen Reaction Noted     No known drug allergies  09/14/2004       Family History   Problem Relation Age of Onset     Cancer - colorectal Mother      having chemo at age66     Breast Cancer Paternal Grandmother      Alcohol/Drug Father      alcohol     Alcohol/Drug Paternal Grandfather      Arthritis Mother      Arthritis Maternal Grandmother      Depression Mother      OSTEOPOROSIS Maternal Grandmother        Social History     Social History     Marital status: Single     Spouse name: N/A     Number of children: N/A     Years of education: N/A     Occupational History     Not on file.     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 Yes        Cholesterol screen up to date: Yes    Eye Exam up to date: Yes    Dental Exam up to date: yes    Pap smear up to date: Does Not Apply    Mammogram up to date: Does Not Apply    Dexa Scan up to date: Does Not Apply    Colonoscopy up to date: summer 2007    Immunizations up to date: Yes    Glucose screen if over 40:  Yes    Fletcher Bruno ma                   REVIEW OF SYSTEMS:   See HPI for  "pertinent details.  Remainder of 10-point ROS negative.    PHYSICAL EXAM:  General: Well-dressed, well-nourished man in no acute distress  Vitals: /80  Pulse 69  Wt 101.6 kg (223 lb 14.4 oz)  BMI 29.95 kg/m2 Estimated body mass index is 29.95 kg/(m^2) as calculated from the following:    Height as of 7/23/14: 1.842 m (6' 0.5\").    Weight as of this encounter: 101.6 kg (223 lb 14.4 oz).  Eyes: Anicteric, EOMI  Lymph: No cervical, supraclavicular lymphadenopathy  Lungs: No respiratory distress  Neurologic: Grossly nonfocal    REVIEW OF IMAGING STUDIES:  Retrograde urethrogram and voiding cystourethrogram were performed earlier and the images were independently interpreted by me and are reviewed with the patient.  These demonstrate a funnel shaped bladder neck with an area of narrowing which is indeterminate for stricture versus external sphincter. On RUG, the contrast stops abruptly at this area.  The bladder contour is normal without diverticulae.        REVIEW OF OFFICE STUDIES:       REVIEW OF OUTSIDE RECORDS: None      ASSESSMENT/PLAN:  A 52 year old-year-old gentleman with a history of RALP in 2010 followed by salvage XRT now with a several year history of gradually worsening LUTS.     Additional work-up includes: Cystoscopy to rule out bladder neck contracture and assess for stricture. Further treatment planning pending results of cystoscopy.     Patient was seen and examined with Dr. Soliz    --    Elton Zapata MD  Urology Resident    3:19 PM, 4/23/2018  =======================================================  As the attending surgeon I, Alexis Soliz, interviewed and examined the patient. The plan was developed between me and the patient. My findings and plan are as stated by the resident.      Again, thank you for allowing me to participate in the care of your patient.      Sincerely,    Alexis Soliz MD      "

## 2018-04-23 NOTE — TELEPHONE ENCOUNTER
----- Message from Lisa Mendoza sent at 4/23/2018  4:03 PM CDT -----  Regarding: Pt left prior to scheduling 4/23  Patient left prior to scheduling on 4/23/18. Patient return for cystoscopy appointment needed.

## 2018-04-23 NOTE — PROGRESS NOTES
UROLOGY CONSULT HISTORY & PHYSICAL    Name: Duglas Gao    MRN: [id number]   YOB: 1965         CHIEF COMPLAINT:  Urethral stricture    HISTORY OF PRESENT ILLNESS:  Mr. Gao is a 52 year old-year-old man seen in consultation from Dr. Pollock for possible urethral stricture. He has a history of prostate cancer for which he underwent RALP in 2010. He subsequently underwent salvage XRT which was competed in 12/2013. Since that time he has had gradually worsening urinary symptoms which include hesitancy, straining, and weak stream. He has occasional urgency and frequency but this is less bothersome. He has very minimal incontinence and only in very cold weather.     He does not have a history of self dilation. He currently does not perform self dilation. He does not currently have an indwelling urethral catheter.    Etiology:  He denies a history of sexually transmitted diseases. He denies a history of straddle injury. He denies a history of pelvic fracture. He confirms a history of urethral catheterization or instrumentation.    REVIEW OF QUESTIONNAIRES:  Questionnaires reviewed. See flowsheet for details.    Past Medical History:   Diagnosis Date     Allergic rhinitis, cause unspecified      Prostate cancer (H)        Past Surgical History:   Procedure Laterality Date     DAVINCI PROSTATECTOMY         Current Outpatient Prescriptions   Medication     Atorvastatin Calcium (LIPITOR PO)     BuPROPion HCl (WELLBUTRIN PO)     fluticasone (FLONASE) 50 MCG/ACT nasal spray     Ibuprofen (ADVIL PO)     No current facility-administered medications for this visit.        Allergies as of 04/23/2018 - Felipe as Reviewed 04/23/2018   Allergen Reaction Noted     No known drug allergies  09/14/2004       Family History   Problem Relation Age of Onset     Cancer - colorectal Mother      having chemo at age66     Breast Cancer Paternal Grandmother      Alcohol/Drug Father      alcohol     Alcohol/Drug Paternal  Grandfather      Arthritis Mother      Arthritis Maternal Grandmother      Depression Mother      OSTEOPOROSIS Maternal Grandmother        Social History     Social History     Marital status: Single     Spouse name: N/A     Number of children: N/A     Years of education: N/A     Occupational History     Not on file.     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 Yes        Cholesterol screen up to date: Yes    Eye Exam up to date: Yes    Dental Exam up to date: yes    Pap smear up to date: Does Not Apply    Mammogram up to date: Does Not Apply    Dexa Scan up to date: Does Not Apply    Colonoscopy up to date: summer 2007    Immunizations up to date: Yes    Glucose screen if over 40:  Yes    Fletcher Bruno ma                   REVIEW OF SYSTEMS:   See HPI for pertinent details.  Remainder of 10-point ROS negative.    PHYSICAL EXAM:  General: Well-dressed, well-nourished man in no acute distress  Vitals: /80  Pulse 69  Wt 101.6 kg (223 lb 14.4 oz)  BMI 29.95 kg/m2 Estimated body mass index is 29.95 kg/(m^2) as calculated from the following:    Height as of 7/23/14:  "1.842 m (6' 0.5\").    Weight as of this encounter: 101.6 kg (223 lb 14.4 oz).  Eyes: Anicteric, EOMI  Lymph: No cervical, supraclavicular lymphadenopathy  Lungs: No respiratory distress  Neurologic: Grossly nonfocal    REVIEW OF IMAGING STUDIES:  Retrograde urethrogram and voiding cystourethrogram were performed earlier and the images were independently interpreted by me and are reviewed with the patient.  These demonstrate a funnel shaped bladder neck with an area of narrowing which is indeterminate for stricture versus external sphincter. On RUG, the contrast stops abruptly at this area.  The bladder contour is normal without diverticulae.        REVIEW OF OFFICE STUDIES:       REVIEW OF OUTSIDE RECORDS: None      ASSESSMENT/PLAN:  A 52 year old-year-old gentleman with a history of RALP in 2010 followed by salvage XRT now with a several year history of gradually worsening LUTS.     Additional work-up includes: Cystoscopy to rule out bladder neck contracture and assess for stricture. Further treatment planning pending results of cystoscopy.     Patient was seen and examined with Dr. Soliz    --    Elton Zapata MD  Urology Resident    3:19 PM, 4/23/2018  =======================================================  As the attending surgeon I, Alexis Soliz, interviewed and examined the patient. The plan was developed between me and the patient. My findings and plan are as stated by the resident.    Alexis Soliz MD    "

## 2018-04-23 NOTE — NURSING NOTE
Chief Complaint   Patient presents with     RECHECK     Urethral stricture consult.                   Tosin Slade

## 2018-04-23 NOTE — PATIENT INSTRUCTIONS
Return for a cystoscopy. Please come with a full bladder for a urine flow test.    It was a pleasure meeting with you today.  Thank you for allowing me and my team the privilege of caring for you today.  YOU are the reason we are here, and I truly hope we provided you with the excellent service you deserve.  Please let us know if there is anything else we can do for you so that we can be sure you are leaving completely satisfied with your care experience.

## 2018-04-23 NOTE — MR AVS SNAPSHOT
After Visit Summary   4/23/2018    Duglas Gao    MRN: [id number]           Patient Information     Date Of Birth          1965        Visit Information        Provider Department      4/23/2018 3:00 PM Alexis Soliz MD [facility] Urology and [facility] for Prostate and Urologic Cancers        Today's Diagnoses     Other specified causes of urethral stricture    -  1      Care Instructions    Return for a cystoscopy. Please come with a full bladder for a urine flow test.    It was a pleasure meeting with you today.  Thank you for allowing me and my team the privilege of caring for you today.  YOU are the reason we are here, and I truly hope we provided you with the excellent service you deserve.  Please let us know if there is anything else we can do for you so that we can be sure you are leaving completely satisfied with your care experience.                  Follow-ups after your visit        Your next 10 appointments already scheduled     Aug 24, 2018  3:30 PM CDT   LAB with  LAB   [facility] ([facility])    [address] [address] [phone]           Please do not eat 10-12 hours before your appointment if you are coming in fasting for labs on lipids, cholesterol, or glucose (sugar). This does not apply to pregnant women. Water, hot tea and black coffee (with nothing added) are okay. Do not drink other fluids, diet soda or chew gum.            Aug 27, 2018  3:30 PM CDT   Return Visit with Lyn Pollock MD   Radiation Oncology Clinic ([facility])    [facility]  1st Floor  [address] [place] [id number] [phone]              Who to contact     Please call your clinic at [phone] to:    Ask questions about your health    Make or cancel appointments    Discuss your medicines    Learn about your test results    Speak to your doctor            Additional  Information About Your Visit        [employer] Information     [employer] is an electronic gateway that provides easy, online access to your medical records. With [employer], you can request a clinic appointment, read your test results, renew a prescription or communicate with your care team.     To sign up for [employer] visit the website at [email or web address]   You will be asked to enter the access code listed below, as well as some personal information. Please follow the directions to create your username and password.     Your access code is: [email or web address]  Expires: 2018  6:43 PM     Your access code will  in 90 days. If you need help or a new code, please contact your [facility] or call [phone] for assistance.        Care EveryWhere ID     This is your Care EveryWhere ID. This could be used by other organizations to access your [place] medical records  [id number]        Your Vitals Were     Pulse BMI (Body Mass Index)                69 29.95 kg/m2           Blood Pressure from Last 3 Encounters:   18 129/80   18 (!) 153/91   17 142/89    Weight from Last 3 Encounters:   18 101.6 kg (223 lb 14.4 oz)   18 101.9 kg (224 lb 9.6 oz)   17 101.6 kg (224 lb)              Today, you had the following     No orders found for display       Primary Care Provider Office Phone # Fax #    Siddhartha Hill [phone]       [facility] [address] [facility] [address]        Equal Access to Services     TISHA PINA : Hadii aad ku hadasho Soomaali, waaxda luqadaha, qaybta kaalmada adeegyada, april schulz . So [facility] [phone].    ATENCIÓN: Si habla español, tiene a avila disposición servicios gratuitos de asistencia lingüística. Llame al [phone].    We comply with applicable federal civil rights laws and [place] laws. We do not discriminate on the basis of race, color, national  origin, age, disability, sex, sexual orientation, or gender identity.            Thank you!     Thank you for choosing [facility] UROLOGY AND [facility] FOR PROSTATE AND UROLOGIC CANCERS  for your care. Our goal is always to provide you with excellent care. Hearing back from our patients is one way we can continue to improve our services. Please take a few minutes to complete the written survey that you may receive in the mail after your visit with us. Thank you!             Your Updated Medication List - Protect others around you: Learn how to safely use, store and throw away your medicines at www.disposemymeds.org.          This list is accurate as of 4/23/18  4:59 PM.  Always use your most recent med list.                   Brand Name Dispense Instructions for use Diagnosis    ADVIL PO           fluticasone 50 MCG/ACT spray    FLONASE    48 g    USE 2 SPRAYS INTO BOTH NOSTRILS DAILY    Allergic rhinitis, unspecified       LIPITOR PO           WELLBUTRIN PO      Take 300 mg by mouth daily

## 2018-04-23 NOTE — TELEPHONE ENCOUNTER
Left generic voice message with Urology scheduling number for patient to call back and schedule Cystoscopy per Dr. Bellamy.

## 2018-08-18 ENCOUNTER — HEALTH MAINTENANCE LETTER (OUTPATIENT)
Age: 53
End: 2018-08-18

## 2018-08-20 ENCOUNTER — TELEPHONE (OUTPATIENT)
Dept: RADIATION ONCOLOGY | Facility: CLINIC | Age: 53
End: 2018-08-20

## 2018-08-20 NOTE — TELEPHONE ENCOUNTER
Carlos called re: getting [facility] at [facility]. RN faxed orders to clinic. Pt will call and set up an appointment

## 2018-08-25 NOTE — PROGRESS NOTES
Radiation Oncology Follow-up Note  DATE: 2018  NAME:Duglas Gao   MRN: [id number]  : 1965     DISEASE TREATED: pT2b N0 M0, [place] 4+4= 8 adenocarcinoma, PSA = 3.7 (pre-op) / 0.34 (pre-XRT)     TYPE OF RADIATION THERAPY ADMINISTERED: Salvage RT: 7020 cGy in 39 daily fractions with neoadjuvant and concurrent ADT    INTERVAL SINCE COMPLETION OF RADIATION THERAPY:  4 years, 8 months since completion on 13     ANDROGEN DEPRIVATION THERAPY:  2013                    Lupron 30 mg  2014                    Lupron 30 mg  2014                    Lupron 30 mg  Lupron discontinued after 2014 injection secondary to side effects       SUBJECTIVE:   Duglas Gao is a 52 year old man who is s/p radical prostatectomy in 2010 and salvage radiotherapy for high-risk prostate cancer .He received salvage radiation with neoadjuvant and concurrent ADT as described above. Due to side effects of hot flashes and weight gain he discontinued Lupron after a total of 1 year of ADT.      He returns to our clinic today for a routine follow-up visit. He reports that he is doing well, although he continues to struggle with urinary symptoms. He has increased frequency, urgency, difficulty to initiate urination and a weak stream. He tried Flomax before for several months with no improvement. We offered to try Flomax again but he preferred to go without treatment. He also has no residual side effects from ADT and denies hot flashes, sweats, and fatigue. His AUA symptom score today is 15 /35 which unchanged since last visit (15/35) and ALEXANDRA score is 1/25.       Date Value   2018 <0.1   2018 <0.1   2017 <0.1   2016 <0.1   2016 <0.01   2015 <0.06   2015 <0.01   2014 <0.01   2014 <0.07   2011 <0.10   2010 3.70     OBJECTIVE:   VITALS:Vitals: /88  Pulse 65  Wt 102.3 kg (225 lb 8 oz)  SpO2 96%  BMI 30.16 kg/m2  BMI= Body mass  index is 30.16 kg/(m^2).  GENERAL:  Appears well, in no acute distress.      IMPRESSION: No clinical evidence for recurrent disease at this time.      RECOMMENDATIONS: Patient will follow up in 6 months with Tabitha with a repeat PSA.    We spent 15 min with the patient, 100% devoted to counseling. The patient has many questions during our conversation that were answered to his satisfaction and verbalized understanding.     Mr. Gao was seen and discussed with the staff, Dr. Phill Mittal MD  PGY-2 Resident, Radiation Oncology  [facility]    I saw the patient with the resident.  I agree with the resident's note and plan of care.      OLAYINKA Pollock M.D.  Department of Radiation Oncology  [facility]

## 2018-08-27 ENCOUNTER — OFFICE VISIT (OUTPATIENT)
Dept: RADIATION ONCOLOGY | Facility: CLINIC | Age: 53
End: 2018-08-27
Attending: RADIOLOGY
Payer: COMMERCIAL

## 2018-08-27 VITALS
SYSTOLIC BLOOD PRESSURE: 146 MMHG | HEART RATE: 65 BPM | BODY MASS INDEX: 30.16 KG/M2 | WEIGHT: 225.5 LBS | OXYGEN SATURATION: 96 % | DIASTOLIC BLOOD PRESSURE: 88 MMHG

## 2018-08-27 DIAGNOSIS — C61 MALIGNANT NEOPLASM OF PROSTATE (H): Primary | ICD-10-CM

## 2018-08-27 PROCEDURE — G0463 HOSPITAL OUTPT CLINIC VISIT: HCPCS | Performed by: RADIOLOGY

## 2018-08-27 NOTE — MR AVS SNAPSHOT
After Visit Summary   8/27/2018    Duglas Gao    MRN: [id number]           Patient Information     Date Of Birth          1965        Visit Information        Provider Department      8/27/2018 3:30 PM Lyn Pollock MD Radiation Oncology Clinic         Follow-ups after your visit        Your next 10 appointments already scheduled     Apr 23, 2018  3:00 PM CDT   (Arrive by 2:45 PM)   RETURN URETHRAL STRICTURE with Alexis Soliz MD   [facility] Urologic C[facility] ([facility])    [address]  4th Floor  [place] [id number] [phone]            Aug 24, 2018  3:30 PM CDT   LAB with  LAB   [facility] ([facility])    [address]  1st Floor  [place] [id number] [phone]           Please do not eat 10-12 hours before your appointment if you are coming in fasting for labs on lipids, cholesterol, or glucose (sugar). This does not apply to pregnant women. Water, hot tea and black coffee (with nothing added) are okay. Do not drink other fluids, diet soda or chew gum.            Aug 27, 2018  3:30 PM CDT   Return Visit with Lyn Pollock MD   Radiation Oncology Clinic ([facility])    [facility]  1st Floor  [address] [id number] [phone]              Future tests that were ordered for you today     Open Future Orders        Priority Expected Expires Ordered    X-Ray Voiding cystogram Routine 2/27/2018 2/27/2019 2/27/2018    X-Ray Urethrogram retrograde Routine 2/27/2018 2/27/2019 2/27/2018            Who to contact     Please call your clinic at [phone] to:    Ask questions about your health    Make or cancel appointments    Discuss your medicines    Learn about your test results    Speak to your doctor            Additional Information About Your Visit        MyChart Information      [employer] is an electronic gateway that provides easy, online access to your medical records. With [employer], you can request a clinic appointment, read your test results, renew a prescription or communicate with your care team.     To sign up for [employer] visit the website at [email or web address]   You will be asked to enter the access code listed below, as well as some personal information. Please follow the directions to create your username and password.     Your access code is: [email or web address]  Expires: 2018  5:43 PM     Your access code will  in 90 days. If you need help or a new code, please contact your [facility] or call [phone] for assistance.        Care EveryWhere ID     This is your Care EveryWhere ID. This could be used by other organizations to access your [facility] medical records  [id number]         Blood Pressure from Last 3 Encounters:   18 (!) 153/91   17 142/89   17 121/62    Weight from Last 3 Encounters:   18 101.9 kg (224 lb 9.6 oz)   17 101.6 kg (224 lb)   17 102.1 kg (225 lb)              Today, you had the following     No orders found for display       Primary Care Provider Office Phone # Fax #    Siddhartha Hill [phone]       [facility] [address] [facility] [address]        Equal Access to Services     DAVIS PINA : Hadii aad ku hadasho Soomaali, waaxda luqadaha, qaybta kaalmada adeegyada, april schulz . So [facility] [phone].    ATENCIÓN: Si habla alfredo, tiene a avila disposición servicios gratuitos de asistencia lingüística. Llame al [phone].    We comply with applicable federal civil rights laws and [place] laws. We do not discriminate on the basis of race, color, national origin, age, disability, sex, sexual orientation, or gender identity.            Thank you!     Thank you for choosing [facility] ONCOLOGY CLINIC  for your  care. Our goal is always to provide you with excellent care. Hearing back from our patients is one way we can continue to improve our services. Please take a few minutes to complete the written survey that you may receive in the mail after your visit with us. Thank you!             Your Updated Medication List - Protect others around you: Learn how to safely use, store and throw away your medicines at www.disposemymeds.org.          This list is accurate as of 2/28/18  8:57 AM.  Always use your most recent med list.                   Brand Name Dispense Instructions for use Diagnosis    fluticasone 50 MCG/ACT spray    FLONASE    48 g    USE 2 SPRAYS INTO BOTH NOSTRILS DAILY    Allergic rhinitis, unspecified       LIPITOR PO           WELLBUTRIN PO      Take 300 mg by mouth daily

## 2018-08-27 NOTE — LETTER
2018       RE: Duglas Gao  [address] [place] [address]     Dear Colleague,    Thank you for referring your patient, Duglas Gao, to the RADIATION ONCOLOGY CLINIC. Please see a copy of my visit note below.    Radiation Oncology Follow-up Note  DATE: 2018  NAME:Duglas Gao   MRN: [id number]  : 1965     DISEASE TREATED: pT2b N0 M0, [place] 4+4= 8 adenocarcinoma, PSA = 3.7 (pre-op) / 0.34 (pre-XRT)     TYPE OF RADIATION THERAPY ADMINISTERED: Salvage RT: 7020 cGy in 39 daily fractions with neoadjuvant and concurrent ADT    INTERVAL SINCE COMPLETION OF RADIATION THERAPY:  4 years, 8 months since completion on 13     ANDROGEN DEPRIVATION THERAPY:  2013                    Lupron 30 mg  2014                    Lupron 30 mg  2014                    Lupron 30 mg  Lupron discontinued after 2014 injection secondary to side effects       SUBJECTIVE:   Duglas Gao is a 52 year old man who is s/p radical prostatectomy in 2010 and salvage radiotherapy for high-risk prostate cancer .He received salvage radiation with neoadjuvant and concurrent ADT as described above. Due to side effects of hot flashes and weight gain he discontinued Lupron after a total of 1 year of ADT.      He returns to our clinic today for a routine follow-up visit. He reports that he is doing well, although he continues to struggle with urinary symptoms. He has increased frequency, urgency, difficulty to initiate urination and a weak stream. He tried Flomax before for several months with no improvement. We offered to try Flomax again but he preferred to go without treatment. He also has no residual side effects from ADT and denies hot flashes, sweats, and fatigue. His AUA symptom score today is 15 /35 which unchanged since last visit (15/35) and ALEXANDRA score is 1/25.       Date Value   2018 <0.1   2018 <0.1   2017 <0.1   2016 <0.1   2016  <0.01   07/23/2015 <0.06   02/06/2015 <0.01   08/11/2014 <0.01   02/14/2014 <0.07   05/23/2011 <0.10   07/29/2010 3.70     OBJECTIVE:   VITALS:Vitals: /88  Pulse 65  Wt 102.3 kg (225 lb 8 oz)  SpO2 96%  BMI 30.16 kg/m2  BMI= Body mass index is 30.16 kg/(m^2).  GENERAL:  Appears well, in no acute distress.      IMPRESSION: No clinical evidence for recurrent disease at this time.      RECOMMENDATIONS: Patient will follow up in 6 months with Tabitha with a repeat PSA.    We spent 15 min with the patient, 100% devoted to counseling. The patient has many questions during our conversation that were answered to his satisfaction and verbalized understanding.     Mr. Gao was seen and discussed with the staff, Dr. Phill Mittal MD  PGY-2 Resident, Radiation Oncology  [facility]    I saw the patient with the resident.  I agree with the resident's note and plan of care.      OLAYINKA Pollock M.D.  Department of Radiation Oncology  [facility]

## 2018-08-27 NOTE — NURSING NOTE
FOLLOW-UP VISIT    Patient Name: Duglas Gao      : 1965     Age: 53 year old        ______________________________________________________________________________     Chief Complaint   Patient presents with     Cancer     Radiation F/U for prostate cancer     /88  Pulse 65  Wt 102.3 kg (225 lb 8 oz)  SpO2 96%  BMI 30.16 kg/m2     Date Radiation Completed: Prostates ca: abd/pelvis 7020 cGy completed 13    Pain  Denies    Labs  Other Labs: No    Imaging  None        PSA:   PSA   Date Value Ref Range Status   2018 <0.1 ng/mL Final   2017 <0.1 ng/mL Final   2016 <0.1 ng/mL Final   2016 <0.01 0 - 4 ug/L Final   2015 <0.06 ng/mL Final   2015 <0.01 0 - 4 ug/L Final   2014 <0.01 0 - 4 ug/L Final   2014  0 - 4 ug/L Final    <0.07  PSA results are about 7% lower than our prior method due to a methodology change   on 2011.   2011 <0.10 0 - 4 ug/L Final   2010 3.70 0 - 4 ug/L Final     Testosterone Level:   Testosterone Total   Date Value Ref Range Status   2014 2 (L) 240 - 950 ng/dL Final   2012 429 240 - 950 ng/dL Final   2010 433 241 - 827 ng/dL Final       On-Study AUA Symptom Score (PQ)  AUA (American Urological Association) Symptom Score  1. Over the past month, how often have you had a sensation of not emptying your bladder completely after you finished urinating?: Less than half the time  2. Over the past month, how often have you had to urinate again less than two hours after you finished urinating?: About half the time  3. Over the past month, how often have you found you stopped and started again several times when you urinated?: More than half the time  4. Over the past month, how often have you found it difficult to postpone urination?: Not at all  5. Over the past month, how often have you had a weak urine stream?: Less than half the time  6. Over the past month, how often have you had to push  or strain to begin urinating?: About half the time  7. Over the past month, how many times did you typically get up to urinate from the time you went to bed at night until the time you got up in the morning?: 1 time  AUA Score: 15    Diarrhea: 0- None    Constipation: 0- None      Other Appointments: No    MD Name:  Appointment Date:    MD Name: Appointment Date:   MD Name: Appointment Date:   Other Appointment Notes:     Residual Radiation side effect: No complaints r/t radiation     Additional Instructions:    Nurse face-to-face time: Level 3:  10 min face to face time

## 2019-02-11 LAB — PSA SERPL-MCNC: <0.1 NG/ML

## 2019-02-14 ENCOUNTER — OFFICE VISIT (OUTPATIENT)
Dept: RADIATION ONCOLOGY | Facility: CLINIC | Age: 54
End: 2019-02-14
Attending: NURSE PRACTITIONER
Payer: COMMERCIAL

## 2019-02-14 VITALS
BODY MASS INDEX: 29.88 KG/M2 | DIASTOLIC BLOOD PRESSURE: 87 MMHG | SYSTOLIC BLOOD PRESSURE: 154 MMHG | HEART RATE: 117 BPM | WEIGHT: 223.4 LBS

## 2019-02-14 DIAGNOSIS — C61 PROSTATE CANCER (H): Primary | ICD-10-CM

## 2019-02-14 PROCEDURE — G0463 HOSPITAL OUTPT CLINIC VISIT: HCPCS | Performed by: NURSE PRACTITIONER

## 2019-02-14 NOTE — PROGRESS NOTES
Radiation Oncology Follow-up Note  DATE: 19  NAME:Duglas Gao   MRN: [id number]  : 1965     DISEASE TREATED: pT2b N0 M0, [place] 4+4= 8 adenocarcinoma, PSA = 3.7 (pre-op) / 0.34 (pre-XRT)     TYPE OF RADIATION THERAPY ADMINISTERED: Salvage RT: 7020 cGy in 39 daily fractions with neoadjuvant and concurrent ADT    INTERVAL SINCE COMPLETION OF RADIATION THERAPY:  5 years, 2 months since completion on 13     ANDROGEN DEPRIVATION THERAPY:  2013                    Lupron 30 mg  2014                    Lupron 30 mg  2014                    Lupron 30 mg  Lupron discontinued after 2014 injection secondary to side effects       SUBJECTIVE:   Duglas Gao is a 52 year old man who is s/p radical prostatectomy in 2010 and salvage radiotherapy for high-risk prostate cancer. He received salvage radiation with neoadjuvant and concurrent ADT as described above. Due to side effects of hot flashes and weight gain he discontinued Lupron after a total of 1 year of ADT.      He returns to our clinic today for a routine follow-up visit. He reports that he is doing well, although he continues to struggle with urinary symptoms. He has increased frequency, urgency, difficulty to initiate urination and a weak stream. He tried Flomax before for several months with no improvement. He did see urology for this last year and no cause was found.  He doesn't find it that bothersome, so doesn't want to pursue any further work up.  He also has no residual side effects from ADT and denies hot flashes, sweats, and fatigue. His AUA symptom score today is 9 /35 which down since last visit (15/35) and ALEXANDRA score is 1/25.  He does have ED and has not found viagra or cialis to be effective and also failed with the pump.  He will see urology if he would like to pursue any other options.           Date Value   2019 <0.1   2018 <0.1   2018 <0.1   2017 <0.1   2016 <0.1   2016  <0.01   07/23/2015 <0.06   02/06/2015 <0.01   08/11/2014 <0.01   02/14/2014 <0.07   05/23/2011 <0.10   07/29/2010 3.70     OBJECTIVE:   VITALS:  /87   Pulse 117   Wt 101.3 kg (223 lb 6.4 oz)   BMI 29.88 kg/m      GENERAL:  Appears well, in no acute distress.      IMPRESSION: No clinical evidence for recurrent disease at this time.      RECOMMENDATIONS: Patient will follow up in 1 year with PSA prior.  PSA is done outside of [place].    Tabitha Chahal NP  Department of Radiation Oncology  [facility]

## 2019-02-14 NOTE — PROGRESS NOTES
FOLLOW-UP VISIT    Patient Name: Duglas Gao      : 1965     Age: 53 year old        ______________________________________________________________________________     Chief Complaint   Patient presents with     Cancer     fup for rad therapy to prostate bed     /87   Pulse 117   Wt 101.3 kg (223 lb 6.4 oz)   BMI 29.88 kg/m       Date Radiation Completed: 13    Pain  Denies    Labs  Other Labs: Yes: PSA  <0.1    Imaging  None        PSA:   PSA   Date Value Ref Range Status   2019 <0.1 ng/mL Final   2018 <0.1 ng/mL Final   2017 <0.1 ng/mL Final   2016 <0.1 ng/mL Final   2016 <0.01 0 - 4 ug/L Final   2015 <0.06 ng/mL Final   2015 <0.01 0 - 4 ug/L Final   2014 <0.01 0 - 4 ug/L Final   2014  0 - 4 ug/L Final    <0.07  PSA results are about 7% lower than our prior method due to a methodology change   on 2011.   2011 <0.10 0 - 4 ug/L Final     Testosterone Level:   Testosterone Total   Date Value Ref Range Status   2014 2 (L) 240 - 950 ng/dL Final   2012 429 240 - 950 ng/dL Final   2010 433 241 - 827 ng/dL Final       On-Study AUA Symptom Score (PQ)  AUA (American Urological Association) Symptom Score  1. Over the past month, how often have you had a sensation of not emptying your bladder completely after you finished urinating?: Less than half the time  2. Over the past month, how often have you had to urinate again less than two hours after you finished urinating?: Less than half the time  3. Over the past month, how often have you found you stopped and started again several times when you urinated?: Less than 1 time in 5  4. Over the past month, how often have you found it difficult to postpone urination?: Not at all  5. Over the past month, how often have you had a weak urine stream?: Less than 1 time in 5  6. Over the past month, how often have you had to push or strain to begin urinating?: Less than  half the time  7. Over the past month, how many times did you typically get up to urinate from the time you went to bed at night until the time you got up in the morning?: 1 time  AUA Score: 9    Diarrhea: 0- None    Constipation: 0- None      Other Appointments:     MD Name:  Appointment Date:    MD Name: Appointment Date:   MD Name: Appointment Date:   Other Appointment Notes:     Residual Radiation side effect: none     Additional Instructions:     Nurse face-to-face time: Level 3:  10 min face to face time

## 2019-02-14 NOTE — LETTER
RE: Duglas Gao  [address] [place] [address]     Dear Colleague,    Thank you for referring your patient, Duglas Gao, to the RADIATION ONCOLOGY CLINIC. Please see a copy of my visit note below.    Radiation Oncology Follow-up Note  DATE: 19  NAME:Duglas Gao   MRN: [id number]  : 1965     DISEASE TREATED: pT2b N0 M0, [place] 4+4= 8 adenocarcinoma, PSA = 3.7 (pre-op) / 0.34 (pre-XRT)     TYPE OF RADIATION THERAPY ADMINISTERED: Salvage RT: 7020 cGy in 39 daily fractions with neoadjuvant and concurrent ADT    INTERVAL SINCE COMPLETION OF RADIATION THERAPY:   5 years, 2 months since completion on 13     ANDROGEN DEPRIVATION THERAPY:  2013                    Lupron 30 mg  2014                    Lupron 30 mg  2014                    Lupron 30 mg  Lupron discontinued after 2014 injection secondary to side effects       SUBJECTIVE:   Duglas Gao is a 52 year old man who is s/p radical prostatectomy in 2010 and salvage radiotherapy for high-risk prostate cancer. He received salvage radiation with neoadjuvant and concurrent ADT as described above. Due to side effects of hot flashes and weight gain he discontinued Lupron after a total of 1 year of ADT.      He returns to our clinic today for a routine follow-up visit. He reports that he is doing well, although he continues to struggle with urinary symptoms. He has increased frequency, urgency, difficulty to initiate urination and a weak stream. He tried Flomax before for several months with no improvement. He did see urology for this last year and no cause was found.  He doesn't find it that bothersome, so doesn't want to pursue any further work up.  He also has no residual side effects from ADT and denies hot flashes, sweats, and fatigue. His AUA symptom score today is 9 /35 which down since last visit (15/35) and ALEXANDRA score is 1/25.  He does have ED and has not found viagra or cialis to be  effective and also failed with the pump.  He will see urology if he would like to pursue any other options.           Date Value   2019 <0.1   2018 <0.1   2018 <0.1   2017 <0.1   2016 <0.1   2016 <0.01   2015 <0.06   2015 <0.01   2014 <0.01   2014 <0.07   2011 <0.10   2010 3.70     OBJECTIVE:   VITALS:  /87   Pulse 117   Wt 101.3 kg (223 lb 6.4 oz)   BMI 29.88 kg/m       GENERAL:  Appears well, in no acute distress.      IMPRESSION: No clinical evidence for recurrent disease at this time.      RECOMMENDATIONS: Patient will follow up in 1 year with PSA prior.  PSA is done outside of [place].    Tabitha Chahal NP  Department of Radiation Oncology  [facility]    FOLLOW-UP VISIT    Patient Name: Duglas Gao      : 1965     Age: 53 year old        ______________________________________________________________________________     Chief Complaint   Patient presents with     Cancer     fup for rad therapy to prostate bed     /87   Pulse 117   Wt 101.3 kg (223 lb 6.4 oz)   BMI 29.88 kg/m        Date Radiation Completed: 13    Pain  Denies    Labs  Other Labs: Yes: PSA  <0.1    Imaging  None    PSA:   PSA   Date Value Ref Range Status   2019 <0.1 ng/mL Final   2018 <0.1 ng/mL Final   2017 <0.1 ng/mL Final   2016 <0.1 ng/mL Final   2016 <0.01 0 - 4 ug/L Final   2015 <0.06 ng/mL Final   2015 <0.01 0 - 4 ug/L Final   2014 <0.01 0 - 4 ug/L Final   2014  0 - 4 ug/L Final    <0.07  PSA results are about 7% lower than our prior method due to a methodology change   on 2011.   2011 <0.10 0 - 4 ug/L Final     Testosterone Level:   Testosterone Total   Date Value Ref Range Status   2014 2 (L) 240 - 950 ng/dL Final   2012 429 240 - 950 ng/dL Final   2010 433 241 - 827 ng/dL Final       On-Study AUA Symptom Score  (PQ)  AUA (American Urological Association) Symptom Score  1. Over the past month, how often have you had a sensation of not emptying your bladder completely after you finished urinating?: Less than half the time  2. Over the past month, how often have you had to urinate again less than two hours after you finished urinating?: Less than half the time  3. Over the past month, how often have you found you stopped and started again several times when you urinated?: Less than 1 time in 5  4. Over the past month, how often have you found it difficult to postpone urination?: Not at all  5. Over the past month, how often have you had a weak urine stream?: Less than 1 time in 5  6. Over the past month, how often have you had to push or strain to begin urinating?: Less than half the time  7. Over the past month, how many times did you typically get up to urinate from the time you went to bed at night until the time you got up in the morning?: 1 time  AUA Score: 9    Diarrhea: 0- None    Constipation: 0- None    Other Appointments:     MD Name:  Appointment Date:    MD Name: Appointment Date:   MD Name: Appointment Date:   Other Appointment Notes:     Residual Radiation side effect: none     Additional Instructions:     Nurse face-to-face time: Level 3:  10 min face to face time    Again, thank you for allowing me to participate in the care of your patient.      Sincerely,    YESI Montez CNP

## 2019-09-28 ENCOUNTER — HEALTH MAINTENANCE LETTER (OUTPATIENT)
Age: 54
End: 2019-09-28

## 2020-03-03 LAB — PSA SERPL-MCNC: 0.2 NG/ML

## 2020-03-05 ENCOUNTER — DOCUMENTATION ONLY (OUTPATIENT)
Dept: RADIATION ONCOLOGY | Facility: CLINIC | Age: 55
End: 2020-03-05

## 2020-03-10 ENCOUNTER — OFFICE VISIT (OUTPATIENT)
Dept: RADIATION ONCOLOGY | Facility: CLINIC | Age: 55
End: 2020-03-10
Attending: RADIOLOGY
Payer: COMMERCIAL

## 2020-03-10 VITALS
BODY MASS INDEX: 30.15 KG/M2 | SYSTOLIC BLOOD PRESSURE: 159 MMHG | HEART RATE: 76 BPM | DIASTOLIC BLOOD PRESSURE: 93 MMHG | WEIGHT: 225.4 LBS | OXYGEN SATURATION: 93 %

## 2020-03-10 DIAGNOSIS — C61 PROSTATE CANCER (H): Primary | ICD-10-CM

## 2020-03-10 PROCEDURE — G0463 HOSPITAL OUTPT CLINIC VISIT: HCPCS | Performed by: NURSE PRACTITIONER

## 2020-03-10 RX ORDER — ROSUVASTATIN CALCIUM 10 MG/1
10 TABLET, COATED ORAL EVERY MORNING
COMMUNITY
Start: 2019-12-03 | End: 2024-09-09

## 2020-03-10 NOTE — NURSING NOTE
FOLLOW-UP VISIT    Patient Name: Duglas Gao      : 1965     Age: 54 year old        ______________________________________________________________________________     Chief Complaint   Patient presents with     Cancer     Radition follow up     BP (!) 159/93   Pulse 76   Wt 102.2 kg (225 lb 6.4 oz)   SpO2 93%   BMI 30.15 kg/m       Date Radiation Completed: Prostates ca: abd/pelvis 7020 cGy completed 13    Pain  Denies    Labs  Other Labs: No. PSA drawn @ 3/4/2020    Imaging  None    AUA Score: 10     PSA:   PSA   Date Value Ref Range Status   2020 0.2 ng/mL Final   2019 <0.1 ng/mL Final   2018 <0.1 ng/mL Final   2017 <0.1 ng/mL Final   2016 <0.1 ng/mL Final   2016 <0.01 0 - 4 ug/L Final   2015 <0.06 ng/mL Final   2015 <0.01 0 - 4 ug/L Final   2014 <0.01 0 - 4 ug/L Final   2014  0 - 4 ug/L Final    <0.07  PSA results are about 7% lower than our prior method due to a methodology change   on 2011.     Testosterone Level:   Testosterone Total   Date Value Ref Range Status   2014 2 (L) 240 - 950 ng/dL Final   2012 429 240 - 950 ng/dL Final   2010 433 241 - 827 ng/dL Final       On-Study AUA Symptom Score (PQ)       Diarrhea: 0- None    Constipation: 0- None      Other Appointments: No    MD Name:  Appointment Date:    MD Name: Appointment Date:   MD Name: Appointment Date:   Other Appointment Notes:     Residual Radiation side effect: No complaints r/t radiation     Additional Instructions:     Nurse face-to-face time: Level 3:  10 min face to face time

## 2020-03-10 NOTE — PROGRESS NOTES
Radiation Oncology Follow-up Note  DATE: 3/10/2020  NAME:Duglas Gao   MRN: [id number]  : 1965     DISEASE TREATED: pT2b N0 M0, [place] 4+4= 8 adenocarcinoma, PSA = 3.7 (pre-op) / 0.34 (pre-XRT)     TYPE OF RADIATION THERAPY ADMINISTERED: Salvage RT: 7020 cGy in 39 daily fractions with neoadjuvant and concurrent ADT    INTERVAL SINCE COMPLETION OF RADIATION THERAPY:  6 years, 2 months since completion on 13     ANDROGEN DEPRIVATION THERAPY:  2013                    Lupron 30 mg  2014                    Lupron 30 mg  2014                    Lupron 30 mg  Lupron discontinued after 2014 injection secondary to side effects       SUBJECTIVE:   Duglas Gao is a 52 year old man who is s/p radical prostatectomy in 2010 and salvage radiotherapy for high-risk prostate cancer. He received salvage radiation with neoadjuvant and concurrent ADT as described above. Due to side effects of hot flashes and weight gain he discontinued Lupron after a total of 1 year of ADT.      He returns to our clinic today for a routine follow-up visit. He reports that he is doing well, although he continues to struggle with urinary symptoms.  He also has no residual side effects from ADT and denies hot flashes, sweats, and fatigue. His AUA symptom score today is 10 /35 which down since last visit () and ALEXANDRA score is 1/25.  He feels some of his urinary symptoms have settled down with time.  He used to frequently have a sense of urgency but would not need to actually go and he only rarely has that now.    He does have ED and has not found viagra or cialis to be effective and also failed with the pump.  He will see urology if he would like to pursue any other options.           Date Value   3/3/2020 0.2   2019 <0.1   2018 <0.1   2018 <0.1   2017 <0.1   2016 <0.1   2016 <0.01   2015 <0.06   2015 <0.01   2014 <0.01   2014 <0.07   2011  <0.10   07/29/2010 3.70     OBJECTIVE:   VITALS:  BP (!) 159/93   Pulse 76   Wt 102.2 kg (225 lb 6.4 oz)   SpO2 93%   BMI 30.15 kg/m      GENERAL:  Appears well, in no acute distress.      IMPRESSION: No clinical evidence for recurrent disease at this time.      RECOMMENDATIONS: Patient will follow up in 1 year with PSA prior.  PSA is done outside of [place].    Tabitha Chahal NP  Department of Radiation Oncology  [facility]

## 2020-03-10 NOTE — LETTER
3/10/2020       RE: Duglas Gao  [address] [place] [address]     Dear Colleague,    Thank you for referring your patient, Duglas Gao, to the RADIATION ONCOLOGY CLINIC. Please see a copy of my visit note below.    Radiation Oncology Follow-up Note  DATE: 3/10/2020  NAME:Duglas Gao   MRN: [id number]  : 1965     DISEASE TREATED: pT2b N0 M0, [place] 4+4= 8 adenocarcinoma, PSA = 3.7 (pre-op) / 0.34 (pre-XRT)     TYPE OF RADIATION THERAPY ADMINISTERED: Salvage RT: 7020 cGy in 39 daily fractions with neoadjuvant and concurrent ADT    INTERVAL SINCE COMPLETION OF RADIATION THERAPY:  6 years, 2 months since completion on 13     ANDROGEN DEPRIVATION THERAPY:  2013                    Lupron 30 mg  2014                    Lupron 30 mg  2014                    Lupron 30 mg  Lupron discontinued after 2014 injection secondary to side effects       SUBJECTIVE:   Duglas Gao is a 52 year old man who is s/p radical prostatectomy in 2010 and salvage radiotherapy for high-risk prostate cancer. He received salvage radiation with neoadjuvant and concurrent ADT as described above. Due to side effects of hot flashes and weight gain he discontinued Lupron after a total of 1 year of ADT.      He returns to our clinic today for a routine follow-up visit. He reports that he is doing well, although he continues to struggle with urinary symptoms.  He also has no residual side effects from ADT and denies hot flashes, sweats, and fatigue. His AUA symptom score today is 10 /35 which down since last visit () and ALEXANDRA score is 1/25.  He feels some of his urinary symptoms have settled down with time.  He used to frequently have a sense of urgency but would not need to actually go and he only rarely has that now.    He does have ED and has not found viagra or cialis to be effective and also failed with the pump.  He will see urology if he would like to pursue any other  options.           Date Value   3/3/2020 0.2   2/11/2019 <0.1   08/24/2018 <0.1   02/09/2018 <0.1   02/02/2017 <0.1   08/11/2016 <0.1   02/05/2016 <0.01   07/23/2015 <0.06   02/06/2015 <0.01   08/11/2014 <0.01   02/14/2014 <0.07   05/23/2011 <0.10   07/29/2010 3.70     OBJECTIVE:   VITALS:  BP (!) 159/93   Pulse 76   Wt 102.2 kg (225 lb 6.4 oz)   SpO2 93%   BMI 30.15 kg/m      GENERAL:  Appears well, in no acute distress.      IMPRESSION: No clinical evidence for recurrent disease at this time.      RECOMMENDATIONS: Patient will follow up in 1 year with PSA prior.  PSA is done outside of [facility].    Tabitha Chahal NP  Department of Radiation Oncology  [facility]

## 2021-01-10 ENCOUNTER — HEALTH MAINTENANCE LETTER (OUTPATIENT)
Age: 56
End: 2021-01-10

## 2021-10-23 ENCOUNTER — HEALTH MAINTENANCE LETTER (OUTPATIENT)
Age: 56
End: 2021-10-23

## 2022-02-07 ENCOUNTER — VIRTUAL VISIT (OUTPATIENT)
Dept: RADIATION ONCOLOGY | Facility: CLINIC | Age: 57
End: 2022-02-07
Attending: RADIOLOGY
Payer: COMMERCIAL

## 2022-02-07 DIAGNOSIS — C61 PROSTATE CANCER (H): Primary | ICD-10-CM

## 2022-02-07 NOTE — LETTER
2022         RE: Duglas Gao  [address] [place] [address]        Dear Colleague,    Thank you for referring your patient, Duglas Gao, to the [facility] RADIATION ONCOLOGY. Please see a copy of my visit note below.    Radiation Oncology Follow-up Note  DATE: 2022  NAME:Duglas Gao   MRN: [id number]  : 1965     DISEASE TREATED: pT2b N0 M0, [place] 4+4= 8 adenocarcinoma, PSA = 3.7 (pre-op) / 0.34 (pre-XRT)     TYPE OF RADIATION THERAPY ADMINISTERED: Salvage RT: 7020 cGy in 39 daily fractions with neoadjuvant and concurrent ADT    INTERVAL SINCE COMPLETION OF RADIATION THERAPY:  8 years, 3 months since completion on 13     ANDROGEN DEPRIVATION THERAPY:  2013                    Lupron 30 mg  2014                    Lupron 30 mg  2014                    Lupron 30 mg  Lupron discontinued after 2014 injection secondary to side effects       SUBJECTIVE:   Duglas Gao is a 52 year old man who is s/p radical prostatectomy in 2010 and salvage radiotherapy for high-risk prostate cancer. He received salvage radiation with neoadjuvant and concurrent ADT as described above. Due to side effects of hot flashes and weight gain he discontinued Lupron after a total of 1 year of ADT.  He felt miserable on Lupron.    Duglas called today and was scheduled for a phone visit. He was in [place] last year, so has not been to the clinic in a while, but has had PSA done at his primary care.  He overall denies any urinary concerns or symptoms.  He did have a kidney stone last year. He is in otherwise good health.  He went to a routine physical on  at [facility] and PSA took a jump up.  He is very anxious.                Date Value   2022 1.5   2021 0.6   20 0.3   3/3/2020 0.2   2019 <0.1   2018 <0.1   2018 <0.1   2017 <0.1   2016 <0.1   2016 <0.01   2015 <0.06   2015  <0.01   08/11/2014 <0.01   02/14/2014 <0.07   05/23/2011 <0.10   07/29/2010 3.70     OBJECTIVE:   VITALS:  There were no vitals taken for this visit.    GENERAL:  Alert and oriented.  Anxious about rise in PSA.     IMPRESSION:  Increase in PSA.  Doubling time 7.1 months     RECOMMENDATIONS: Had a long discussion with Carlos that PSA of 1.5 is still very low.  He did have a bit of a jump in the last 8 months.  Options would be to repeat PSA in a couple of months.  Carlos is young and in good health.  Discussed the option of a PSMA PET and seeing medical oncology.   Repeat PSA and testosterone.    Tabitha Chahal NP  Department of Radiation Oncology  [facility]

## 2022-02-08 NOTE — PROGRESS NOTES
Radiation Oncology Follow-up Note  DATE: 2022  NAME:Duglas Gao   MRN: [id number]  : 1965     DISEASE TREATED: pT2b N0 M0, [place] 4+4= 8 adenocarcinoma, PSA = 3.7 (pre-op) / 0.34 (pre-XRT)     TYPE OF RADIATION THERAPY ADMINISTERED: Salvage RT: 7020 cGy in 39 daily fractions with neoadjuvant and concurrent ADT    INTERVAL SINCE COMPLETION OF RADIATION THERAPY:  8 years, 3 months since completion on 13     ANDROGEN DEPRIVATION THERAPY:  2013                    Lupron 30 mg  2014                    Lupron 30 mg  2014                    Lupron 30 mg  Lupron discontinued after 2014 injection secondary to side effects       SUBJECTIVE:   Duglas Gao is a 52 year old man who is s/p radical prostatectomy in 2010 and salvage radiotherapy for high-risk prostate cancer. He received salvage radiation with neoadjuvant and concurrent ADT as described above. Due to side effects of hot flashes and weight gain he discontinued Lupron after a total of 1 year of ADT.  He felt miserable on Lupron.    Duglas called today and was scheduled for a phone visit. He was in [place] last year, so has not been to the clinic in a while, but has had PSA done at his primary care.  He overall denies any urinary concerns or symptoms.  He did have a kidney stone last year. He is in otherwise good health.  He went to a routine physical on  at [facility] and PSA took a jump up.  He is very anxious.                Date Value   2022 1.5   2021 0.6   20 0.3   3/3/2020 0.2   2019 <0.1   2018 <0.1   2018 <0.1   2017 <0.1   2016 <0.1   2016 <0.01   2015 <0.06   2015 <0.01   2014 <0.01   2014 <0.07   2011 <0.10   2010 3.70     OBJECTIVE:   VITALS:  There were no vitals taken for this visit.    GENERAL:  Alert and oriented.  Anxious about rise in PSA.     IMPRESSION:  Increase in PSA.  Doubling time 7.1  months     RECOMMENDATIONS: Had a long discussion with Carlos that PSA of 1.5 is still very low.  He did have a bit of a jump in the last 8 months.  Options would be to repeat PSA in a couple of months.  Carlos is young and in good health.  Discussed the option of a PSMA PET and seeing medical oncology.   Repeat PSA and testosterone.    Tabitha Chahal NP  Department of Radiation Oncology  [facility]

## 2022-02-12 ENCOUNTER — HEALTH MAINTENANCE LETTER (OUTPATIENT)
Age: 57
End: 2022-02-12

## 2022-02-17 ENCOUNTER — LAB (OUTPATIENT)
Dept: LAB | Facility: CLINIC | Age: 57
End: 2022-02-17
Payer: COMMERCIAL

## 2022-02-17 ENCOUNTER — HOSPITAL ENCOUNTER (OUTPATIENT)
Dept: PET IMAGING | Facility: CLINIC | Age: 57
Setting detail: NUCLEAR MEDICINE
End: 2022-02-17
Attending: NURSE PRACTITIONER
Payer: COMMERCIAL

## 2022-02-17 ENCOUNTER — HOSPITAL ENCOUNTER (OUTPATIENT)
Dept: PET IMAGING | Facility: CLINIC | Age: 57
End: 2022-02-17
Attending: NURSE PRACTITIONER
Payer: COMMERCIAL

## 2022-02-17 DIAGNOSIS — C61 PROSTATE CANCER (H): ICD-10-CM

## 2022-02-17 LAB
PSA SERPL-MCNC: 1.49 UG/L (ref 0–4)
SHBG SERPL-SCNC: 14 NMOL/L (ref 11–80)

## 2022-02-17 PROCEDURE — 84403 ASSAY OF TOTAL TESTOSTERONE: CPT | Mod: 90 | Performed by: PATHOLOGY

## 2022-02-17 PROCEDURE — 343N000001 HC RX 343: Performed by: NURSE PRACTITIONER

## 2022-02-17 PROCEDURE — 74177 CT ABD & PELVIS W/CONTRAST: CPT | Mod: 26

## 2022-02-17 PROCEDURE — 71260 CT THORAX DX C+: CPT | Mod: 26

## 2022-02-17 PROCEDURE — 36415 COLL VENOUS BLD VENIPUNCTURE: CPT | Performed by: PATHOLOGY

## 2022-02-17 PROCEDURE — 70491 CT SOFT TISSUE NECK W/DYE: CPT

## 2022-02-17 PROCEDURE — 74177 CT ABD & PELVIS W/CONTRAST: CPT | Mod: 59,PS

## 2022-02-17 PROCEDURE — 70491 CT SOFT TISSUE NECK W/DYE: CPT | Mod: 26

## 2022-02-17 PROCEDURE — 84270 ASSAY OF SEX HORMONE GLOBUL: CPT | Mod: 90 | Performed by: PATHOLOGY

## 2022-02-17 PROCEDURE — 250N000011 HC RX IP 250 OP 636: Performed by: NURSE PRACTITIONER

## 2022-02-17 PROCEDURE — 84153 ASSAY OF PSA TOTAL: CPT | Performed by: PATHOLOGY

## 2022-02-17 PROCEDURE — 78815 PET IMAGE W/CT SKULL-THIGH: CPT | Mod: 26

## 2022-02-17 PROCEDURE — A9595 HC RX 343: HCPCS | Performed by: NURSE PRACTITIONER

## 2022-02-17 PROCEDURE — 99000 SPECIMEN HANDLING OFFICE-LAB: CPT | Performed by: PATHOLOGY

## 2022-02-17 RX ORDER — IOPAMIDOL 755 MG/ML
40-135 INJECTION, SOLUTION INTRAVASCULAR ONCE
Status: COMPLETED | OUTPATIENT
Start: 2022-02-17 | End: 2022-02-17

## 2022-02-17 RX ADMIN — PIFLUFOLASTAT F-18 9.2 MILLICURIE: 80 INJECTION INTRAVENOUS at 14:23

## 2022-02-17 RX ADMIN — IOPAMIDOL 135 ML: 755 INJECTION, SOLUTION INTRAVENOUS at 15:31

## 2022-02-21 ENCOUNTER — OFFICE VISIT (OUTPATIENT)
Dept: RADIATION ONCOLOGY | Facility: CLINIC | Age: 57
End: 2022-02-21
Attending: RADIOLOGY
Payer: COMMERCIAL

## 2022-02-21 ENCOUNTER — PATIENT OUTREACH (OUTPATIENT)
Dept: ONCOLOGY | Facility: CLINIC | Age: 57
End: 2022-02-21
Payer: COMMERCIAL

## 2022-02-21 DIAGNOSIS — C61 PROSTATE CANCER (H): Primary | ICD-10-CM

## 2022-02-21 LAB
TESTOST FREE SERPL-MCNC: 5.67 NG/DL
TESTOST SERPL-MCNC: 199 NG/DL (ref 240–950)

## 2022-02-21 NOTE — PROGRESS NOTES
New Patient Oncology Nurse Navigator Note     Referring provider: Tabitha Chahal APRN CNP in [facility] RADIATION ONCOLOGY     Referred to (specialty): Medical Oncology    Date Referral Received: 2/21/22     Evaluation for : recurrent prostate cancer     Clinical History (per Nurse review of records provided): Prostatectomy December 2010 * pathology bookmarked     pT2b N0 M0, [place] 4+4= 8 adenocarcinoma, PSA = 3.7 (pre-op) / 0.34 (pre-XRT)     TYPE OF RADIATION THERAPY ADMINISTERED: Salvage RT: 7020 cGy in 39 daily fractions with neoadjuvant and concurrent ADT     COMPLETION OF RADIATION THERAPY: 12/17/13     Carlos received Lupron in 2013 & 2014, but stopped early due to side effects.      PSA   Date Value   2/4/2022 1.5   4/23/2021 0.6   11/20/20 0.3   3/3/2020 0.2   2/11/2019 <0.1         2/17/22   PSMA PET  IMPRESSION:  In this patient with a history of adenocarcinoma of the  prostate status post prostatectomy, androgen deprivation therapy, and  currently receiving salvage radiation therapy:     1. Hypermetabolic right iliac lymph node with PSMA avidity measuring  13 mm,  metastatic disease.  2. Indeterminant hypodense lesion in hepatic segment 6 measuring up to  1.1 cm. Consider further evaluation with liver MRI.  3. Age-indeterminate left L3 transverse process fracture without  corresponding PSMA activity to suggest metastatic disease or  pathologic fracture.  4. Please see same day dedicated PSMA neck imaging for additional  findings regarding the neck.     Records Location (Care Everywhere, Media, etc.): EPIC      Records Needed: In system     I called Carlos to discuss referral to medical oncology.  I was able to offer an appt this Friday, but he prefers to see Dr. Morocho.  He will be out of town for the first date offered, so plan for him to come on 3/14/22 for consult.  I gave him the location and phone number for [facility].  No questions at this time.  I will forward on to new patient  scheduling.    2/22/22   I called to let Carlos know we are able to get him in to see Dr. Morocho tomorrow instead of waiting until 3/14.  Carlos is agreeable and will be r/s.

## 2022-02-21 NOTE — LETTER
2022         RE: Duglas Gao  [address] [place] [address]        Dear Colleague,    Thank you for referring your patient, Duglas Gao, to the [facility] RADIATION ONCOLOGY. Please see a copy of my visit note below.    Radiation Oncology Follow-up Note  DATE: 2022  NAME:Duglas Gao   MRN: [id number]  : 1965     DISEASE TREATED: pT2b N0 M0, [place] 4+4= 8 adenocarcinoma, PSA = 3.7 (pre-op) / 0.34 (pre-XRT)     TYPE OF RADIATION THERAPY ADMINISTERED: Salvage RT: 7020 cGy in 39 daily fractions with neoadjuvant and concurrent ADT    INTERVAL SINCE COMPLETION OF RADIATION THERAPY:  8 years, 3 months since completion on 13     ANDROGEN DEPRIVATION THERAPY:  2013                    Lupron 30 mg  2014                    Lupron 30 mg  2014                    Lupron 30 mg  Lupron discontinued after 2014 injection secondary to side effects       SUBJECTIVE:   Duglas Gao is a 52 year old man who is s/p radical prostatectomy in 2010 and salvage radiotherapy for high-risk prostate cancer. He received salvage radiation with neoadjuvant and concurrent ADT as described above. Due to side effects of hot flashes and weight gain he discontinued Lupron after a total of 1 year of ADT.  He felt miserable on Lupron.    Duglas called 2 weeks ago as he had a PSA drawn and it was elevated.  He had a PSMA PET scan on 2022 and it shows a hypermetabolic right iliac lymph node measuring 13mm with SUV of 33.72.  He comes in today for results and plan.                Date Value   2022 1.5  (1.49)   2021 0.6   20 0.3   3/3/2020 0.2   2019 <0.1   2018 <0.1   2018 <0.1   2017 <0.1   2016 <0.1   2016 <0.01   2015 <0.06   2015 <0.01   2014 <0.01   2014 <0.07   2011 <0.10   2010 3.70       OBJECTIVE:   VITALS:  There were no vitals taken for this visit.      GENERAL:  Alert and oriented.  Anxious about rise in PSA and getting results.    PMSA PET 2/17/2022:  IMPRESSION:  In this patient with a history of adenocarcinoma of the  prostate status post prostatectomy, androgen deprivation therapy, and  Previous salvage radiation therapy:     1. Hypermetabolic right iliac lymph node with PSMA avidity measuring  13 mm,  metastatic disease.  2. Indeterminant hypodense lesion in hepatic segment 6 measuring up to  1.1 cm. Consider further evaluation with liver MRI.  3. Age-indeterminate left L3 transverse process fracture without  corresponding PSMA activity to suggest metastatic disease or  pathologic fracture.  4. Please see same day dedicated PSMA neck imaging for additional  findings regarding the neck.     IMPRESSION:  Recurrent prostate cancer     RECOMMENDATIONS:  Discussed results with patient.  Will send to medical oncology for further treatment.    Discussed patient with Dr. Pollock and he agreed with medical oncology referral.    Tabitha Chahal NP  Department of Radiation Oncology  [facility]

## 2022-02-21 NOTE — PROGRESS NOTES
Radiation Oncology Follow-up Note  DATE: 2022  NAME:Duglas Gao   MRN: [id number]  : 1965     DISEASE TREATED: pT2b N0 M0, [place] 4+4= 8 adenocarcinoma, PSA = 3.7 (pre-op) / 0.34 (pre-XRT)     TYPE OF RADIATION THERAPY ADMINISTERED: Salvage RT: 7020 cGy in 39 daily fractions with neoadjuvant and concurrent ADT    INTERVAL SINCE COMPLETION OF RADIATION THERAPY:  8 years, 3 months since completion on 13     ANDROGEN DEPRIVATION THERAPY:  2013                    Lupron 30 mg  2014                    Lupron 30 mg  2014                    Lupron 30 mg  Lupron discontinued after 2014 injection secondary to side effects       SUBJECTIVE:   Duglas Gao is a 52 year old man who is s/p radical prostatectomy in 2010 and salvage radiotherapy for high-risk prostate cancer. He received salvage radiation with neoadjuvant and concurrent ADT as described above. Due to side effects of hot flashes and weight gain he discontinued Lupron after a total of 1 year of ADT.  He felt miserable on Lupron.    Duglas called 2 weeks ago as he had a PSA drawn and it was elevated.  He had a PSMA PET scan on 2022 and it shows a hypermetabolic right iliac lymph node measuring 13mm with SUV of 33.72.  He comes in today for results and plan.                Date Value   2022 1.5  (1.49)   2021 0.6   20 0.3   3/3/2020 0.2   2019 <0.1   2018 <0.1   2018 <0.1   2017 <0.1   2016 <0.1   2016 <0.01   2015 <0.06   2015 <0.01   2014 <0.01   2014 <0.07   2011 <0.10   2010 3.70       OBJECTIVE:   VITALS:  There were no vitals taken for this visit.     GENERAL:  Alert and oriented.  Anxious about rise in PSA and getting results.    PMSA PET 2022:  IMPRESSION:  In this patient with a history of adenocarcinoma of the  prostate status post prostatectomy, androgen deprivation therapy, and  Previous salvage radiation  therapy:     1. Hypermetabolic right iliac lymph node with PSMA avidity measuring  13 mm,  metastatic disease.  2. Indeterminant hypodense lesion in hepatic segment 6 measuring up to  1.1 cm. Consider further evaluation with liver MRI.  3. Age-indeterminate left L3 transverse process fracture without  corresponding PSMA activity to suggest metastatic disease or  pathologic fracture.  4. Please see same day dedicated PSMA neck imaging for additional  findings regarding the neck.     IMPRESSION:  Recurrent prostate cancer     RECOMMENDATIONS:  Discussed results with patient.  Will send to medical oncology for further treatment.    Discussed patient with Dr. Pollock and he agreed with medical oncology referral.    Tabitha Chahal NP  Department of Radiation Oncology  [facility]

## 2022-02-22 NOTE — TELEPHONE ENCOUNTER
RECORDS STATUS - ALL OTHER DIAGNOSIS      RECORDS RECEIVED FROM: [facility]   DATE RECEIVED: 02/22/22   NOTES STATUS DETAILS   OFFICE NOTE from referring provider Epic 02/21/22: YESI Escobedo, CNP   OFFICE NOTE from medical oncologist     DISCHARGE SUMMARY from hospital [facility] 08/21/12: [facility]   DISCHARGE REPORT from the ER     OPERATIVE REPORT [facility] 08/21/12: COLONOSCOPY, WITH POLYPECTOMY AND BIOPSY  12/06/10: Robotic radical prostatectomy   MEDICATION LIST [facility]- [facility]    CLINICAL TRIAL TREATMENTS TO DATE     LABS     PATHOLOGY REPORTS Reports in Epic 08/21/12: [id number]  12/06/10: [id number]  10/13/10: [id number]     ANYTHING RELATED TO DIAGNOSIS Epic Most recent labs 02/17/22   GENONOMIC TESTING     TYPE:     IMAGING (NEED IMAGES & REPORT)     CT SCANS PACS 10/28/10: CT Pelvis   XRAYS PACS 04/23/18: XR Cystogram  04/23/18: XR Urethrogram   MRI     NM PACS 10/27/10: NM Bone Full Body   ULTRASOUND     PET PACS 02/17/22: PET PSMA Eyes to thighs

## 2022-02-23 ENCOUNTER — PRE VISIT (OUTPATIENT)
Dept: ONCOLOGY | Facility: CLINIC | Age: 57
End: 2022-02-23
Payer: COMMERCIAL

## 2022-02-23 ENCOUNTER — ONCOLOGY VISIT (OUTPATIENT)
Dept: ONCOLOGY | Facility: CLINIC | Age: 57
End: 2022-02-23
Attending: NURSE PRACTITIONER
Payer: COMMERCIAL

## 2022-02-23 VITALS
SYSTOLIC BLOOD PRESSURE: 147 MMHG | BODY MASS INDEX: 31.84 KG/M2 | HEART RATE: 65 BPM | WEIGHT: 227.4 LBS | RESPIRATION RATE: 16 BRPM | TEMPERATURE: 97.9 F | OXYGEN SATURATION: 98 % | HEIGHT: 71 IN | DIASTOLIC BLOOD PRESSURE: 91 MMHG

## 2022-02-23 DIAGNOSIS — C61 PROSTATE CANCER (H): ICD-10-CM

## 2022-02-23 PROCEDURE — G0463 HOSPITAL OUTPT CLINIC VISIT: HCPCS

## 2022-02-23 PROCEDURE — 99205 OFFICE O/P NEW HI 60 MIN: CPT | Performed by: INTERNAL MEDICINE

## 2022-02-23 ASSESSMENT — PAIN SCALES - GENERAL: PAINLEVEL: NO PAIN (0)

## 2022-02-23 NOTE — PROGRESS NOTES
NEW PATIENT SECOND OPINION CONSULTATION    Reason for Visit:  Biochemically-recurrent prostate cancer with solitary right iliac amy metastasis.    History of Present Illness:  Dear Dr Mikey Chandler and Dr Lyn Pollock,    Thank you for referring your patient for a Second Opinion consultation at the [facility].  A brief overview of his oncological history as well as my recommendations follow below.    Mr. Gao is a 56-year old man who was diagnosed with prostate cancer in 10/2010, at the age of 45.  His PSA level at that time was 3.7 ng/mL.  He underwent a robotic radical prostatectomy on 12/6/2010.  This revealed prostatic acinar adenocarcinoma, [place] 4+4=8, with organ-confined disease, no extraprostatic extension or seminal vesicle invasion, 0/16 positive lymph nodes, but he did have a positive apical surgical margin.  His final pathological stage was pT2b N0 R1.    Following surgery, he subsequently developed a biochemical recurrence with a PSA level reaching 0.34 ng/mL by 8/2013.  He was then treated with salvage external-beam radiotherapy using 7020 cGy over 39 fractions, ending in 12/2013.  He also received concurrent androgen deprivation therapy for 12 months, ending in 9/2014.    His PSA level then remained undetectable for approximately 6 years.  Unfortunately, in 3/2020, he developed a further biochemical recurrence.  His PSA on 3/3/2020 was 0.2, the PSA on 11/20/2020 was 0.3, the PSA on 4/23/2021 was 0.6, and the PSA on 2/17/2022 was 1.49 ng/mL.  His PSA doubling time was calculated as 7.9 months.    The patient then underwent a PSMA-PET scan on 2/17/2022.  This was generally unremarkable, except that it showed a 1.3 cm PSMA-avid right iliac lymph node metastases which was adjacent to bowel.  There was no other evidence of local recurrence or distant spread.    The patient presents today for a new consultation in the medical oncology clinic.  He is interested  in hearing about his treatment options for oligometastatic prostate cancer relapsing following maximal local therapy.  He wishes to avoid hormonal therapy if possible.    Review of Systems:  The patient reports feeling generally well.  He does have erectile dysfunction, which has been a problem for him since his radical prostatectomy.  He does not report any additional new signs or symptoms at this time.  He has not had any changes in his weight.  He does not have any cancer-related pain.  A comprehensive 14-system review of symptoms is otherwise generally within normal limits.  His ECOG score is 0.  His pain score is 0/10.    Past Medical History:  Hypercholesterolemia  Allergic rhinitis  Depression  L3 transverse process fracture  Right kidney cyst (simple)  Urethral stricture  Kidney stones  Colonic polyps  Erectile dysfunction    Medications:  Rosuvastatin 10 mg  Bupropion 300 mg  Fluticasone nasal spray  Ibuprofen PRN    Allergies:  No known drug allergies    Social History:  The patient lives in [place], [place].  He is unmarried, and is a homosexual man.  He has had several male sexual partners in the past, but he is currently in a stable relationship.  He is a former smoker, but is not actively smoking now.  He drinks occasional (social) alcohol.    Family History:  His mother had a history of colon cancer.  His father did not have any cancer.  His paternal grandmother had either breast cancer or lung cancer (he is not sure).  He has not undergone germline genetic testing yet.    Physical Examination:  Mr. Gao is a 56-year-old man who appears comfortable at rest.  His vital signs are unremarkable.  His HEENT examination is within normal limits.  There is no palpable lymphadenopathy.  The cardiac, pulmonary, and gastrointestinal examinations are within normal limits.  The neuromuscular examination is intact.  The extremities do not demonstrate pitting edema.  The skin evaluation is  unremarkable.      ASSESSMENT AND PLAN:  Mr. Gao is a 56-year-old man with a history of high-risk localized prostate cancer (pT2b N0 R1, [place] 4+4=8, PSA 3.7 ng/mL) who underwent radical prostatectomy followed by salvage radiotherapy.  He subsequently developed a biochemical recurrence with a PSA level of 1.49 ng/mL and a PSMA-PET scan showing a solitary right iliac amy metastasis.  His ECOG score is 0.  His pain score is 0/10.    The patient has developed a single metastatic recurrence involving a right iliac lymph node, without evidence of additional local or distant metastatic deposits.  Thus, he might be able to receive treatment using metastasis-directed therapy, either using stereotactic radiation (SBRT) or possibly even surgical excision of the involved lymph node.  This metastasis-directed therapy could be delivered in combination with a short course (e.g. 6 months) of androgen deprivation therapy, but the patient is opposed to his.  Alternatively, if a metastasis-directed approach is not considered safe or feasible, then the patient could be treated with androgen deprivation therapy alone, using standard ADT with or without the addition of abiraterone.  Finally, if SBRT is not considered feasible or safe, then I will ask Dr Antoine if a surgical approach would be possible.    We also spent some time discussing the importance of germline and somatic genetic testing.  After reviewing the familial, ethical, legal and social implications of germline genetic testing, I have ordered the 84-gene [employer] panel.  In addition, I will order somatic genomic testing with the [employer] platform using his radical prostatectomy specimen (12/6/2010).  If he is found to have a germline or somatic DNA-repair gene mutation, then this could certainly open up additional systemic treatment options moving forward.    A total of 80 minutes were spent in consultation with the patient on the day of the encounter, of which more  than 50% of this time was used for counseling and coordination of care.  The patient was encouraged to ask multiple questions today, all of which were answered to his satisfaction.    Al Morocho M.D.

## 2022-02-23 NOTE — LETTER
2/23/2022         RE: Duglas Gao  [address] [facility] [address]        Dear Colleague,    Thank you for referring your patient, Duglas Gao, to the [facility] CAN[facility]. Please see a copy of my visit note below.      NEW PATIENT SECOND OPINION CONSULTATION    Reason for Visit:  Biochemically-recurrent prostate cancer with solitary right iliac amy metastasis.    History of Present Illness:  Dear Dr Mikey Chandler and Dr Lyn Pollock,    Thank you for referring your patient for a Second Opinion consultation at the [facility].  A brief overview of his oncological history as well as my recommendations follow below.    Mr. Gao is a 56-year old man who was diagnosed with prostate cancer in 10/2010, at the age of 45.  His PSA level at that time was 3.7 ng/mL.  He underwent a robotic radical prostatectomy on 12/6/2010.  This revealed prostatic acinar adenocarcinoma, [place] 4+4=8, with organ-confined disease, no extraprostatic extension or seminal vesicle invasion, 0/16 positive lymph nodes, but he did have a positive apical surgical margin.  His final pathological stage was pT2b N0 R1.    Following surgery, he subsequently developed a biochemical recurrence with a PSA level reaching 0.34 ng/mL by 8/2013.  He was then treated with salvage external-beam radiotherapy using 7020 cGy over 39 fractions, ending in 12/2013.  He also received concurrent androgen deprivation therapy for 12 months, ending in 9/2014.    His PSA level then remained undetectable for approximately 6 years.  Unfortunately, in 3/2020, he developed a further biochemical recurrence.  His PSA on 3/3/2020 was 0.2, the PSA on 11/20/2020 was 0.3, the PSA on 4/23/2021 was 0.6, and the PSA on 2/17/2022 was 1.49 ng/mL.  His PSA doubling time was calculated as 7.9 months.    The patient then underwent a PSMA-PET scan on 2/17/2022.  This was generally unremarkable, except that it  showed a 1.3 cm PSMA-avid right iliac lymph node metastases which was adjacent to bowel.  There was no other evidence of local recurrence or distant spread.    The patient presents today for a new consultation in the medical oncology clinic.  He is interested in hearing about his treatment options for oligometastatic prostate cancer relapsing following maximal local therapy.  He wishes to avoid hormonal therapy if possible.    Review of Systems:  The patient reports feeling generally well.  He does have erectile dysfunction, which has been a problem for him since his radical prostatectomy.  He does not report any additional new signs or symptoms at this time.  He has not had any changes in his weight.  He does not have any cancer-related pain.  A comprehensive 14-system review of symptoms is otherwise generally within normal limits.  His ECOG score is 0.  His pain score is 0/10.    Past Medical History:  Hypercholesterolemia  Allergic rhinitis  Depression  L3 transverse process fracture  Right kidney cyst (simple)  Urethral stricture  Kidney stones  Colonic polyps  Erectile dysfunction    Medications:  Rosuvastatin 10 mg  Bupropion 300 mg  Fluticasone nasal spray  Ibuprofen PRN    Allergies:  No known drug allergies    Social History:  The patient lives in [place], [place].  He is unmarried, and is a homosexual man.  He has had several male sexual partners in the past, but he is currently in a stable relationship.  He is a former smoker, but is not actively smoking now.  He drinks occasional (social) alcohol.    Family History:  His mother had a history of colon cancer.  His father did not have any cancer.  His paternal grandmother had either breast cancer or lung cancer (he is not sure).  He has not undergone germline genetic testing yet.    Physical Examination:  Mr. Gao is a 56-year-old man who appears comfortable at rest.  His vital signs are unremarkable.  His HEENT examination is within normal limits.  There  is no palpable lymphadenopathy.  The cardiac, pulmonary, and gastrointestinal examinations are within normal limits.  The neuromuscular examination is intact.  The extremities do not demonstrate pitting edema.  The skin evaluation is unremarkable.      ASSESSMENT AND PLAN:  Mr. Gao is a 56-year-old man with a history of high-risk localized prostate cancer (pT2b N0 R1, [place] 4+4=8, PSA 3.7 ng/mL) who underwent radical prostatectomy followed by salvage radiotherapy.  He subsequently developed a biochemical recurrence with a PSA level of 1.49 ng/mL and a PSMA-PET scan showing a solitary right iliac amy metastasis.  His ECOG score is 0.  His pain score is 0/10.    The patient has developed a single metastatic recurrence involving a right iliac lymph node, without evidence of additional local or distant metastatic deposits.  Thus, he might be able to receive treatment using metastasis-directed therapy, either using stereotactic radiation (SBRT) or possibly even surgical excision of the involved lymph node.  This metastasis-directed therapy could be delivered in combination with a short course (e.g. 6 months) of androgen deprivation therapy, but the patient is opposed to his.  Alternatively, if a metastasis-directed approach is not considered safe or feasible, then the patient could be treated with androgen deprivation therapy alone, using standard ADT with or without the addition of abiraterone.  Finally, if SBRT is not considered feasible or safe, then I will ask Dr Antoine if a surgical approach would be possible.    We also spent some time discussing the importance of germline and somatic genetic testing.  After reviewing the familial, ethical, legal and social implications of germline genetic testing, I have ordered the 84-gene [employer] panel.  In addition, I will order somatic genomic testing with the [employer] platform using his radical prostatectomy specimen (12/6/2010).  If he is found to have a germline or  somatic DNA-repair gene mutation, then this could certainly open up additional systemic treatment options moving forward.    A total of 80 minutes were spent in consultation with the patient on the day of the encounter, of which more than 50% of this time was used for counseling and coordination of care.  The patient was encouraged to ask multiple questions today, all of which were answered to his satisfaction.    Al Morocho M.D.        Again, thank you for allowing me to participate in the care of your patient.        Sincerely,        Al Morocho MD

## 2022-02-23 NOTE — NURSING NOTE
"Oncology Rooming Note    February 23, 2022 9:33 AM   Duglas Gao is a 56 year old male who presents for:    Chief Complaint   Patient presents with     Oncology Clinic Visit     [facility] NEW - PROSTATE CANCER     Initial Vitals: BP (!) 147/91   Pulse 65   Temp 97.9  F (36.6  C) (Oral)   Resp 16   Ht 1.791 m (5' 10.5\")   Wt 103.1 kg (227 lb 6.4 oz)   SpO2 98%   BMI 32.17 kg/m   Estimated body mass index is 32.17 kg/m  as calculated from the following:    Height as of this encounter: 1.791 m (5' 10.5\").    Weight as of this encounter: 103.1 kg (227 lb 6.4 oz). Body surface area is 2.26 meters squared.  No Pain (0) Comment: Data Unavailable   No LMP for male patient.  Allergies reviewed: Yes  Medications reviewed: Yes    Medications: Medication refills not needed today.  Pharmacy name entered into EPIC:    [facility] [place], [place] - [address] [place] [address] PHARMACY [place], [place] - [address] [place] [address] [phone]  [id number] [place], [place] - [address] AT [facility]    Clinical concerns: No new concerns. Bailee was notified.      Rad Louis LPN            "

## 2022-03-03 ENCOUNTER — TELEPHONE (OUTPATIENT)
Dept: UROLOGY | Facility: CLINIC | Age: 57
End: 2022-03-03
Payer: COMMERCIAL

## 2022-03-03 NOTE — TELEPHONE ENCOUNTER
LVM and callback #. Please schedule this patient on 3/21 with Dr. Antoine in one of the new video spots (right now 12:00 and 2:00 are both open)

## 2022-03-07 ENCOUNTER — PRE VISIT (OUTPATIENT)
Dept: UROLOGY | Facility: CLINIC | Age: 57
End: 2022-03-07
Payer: COMMERCIAL

## 2022-03-07 NOTE — TELEPHONE ENCOUNTER
MEDICAL RECORDS REQUEST   [facility]  Urology Clinic  [address] [place] [address]  PHONE: [phone]  Fax: [phone]        FUTURE VISIT INFORMATION                                                   Duglas FELTON Murray, : 1965 scheduled for future visit at [facility]    APPOINTMENT INFORMATION:    Date: 2022    Provider:  Filippo Antoine MD    Reason for Visit/Diagnosis: Prostate Cancer    RECORDS REQUESTED FOR VISIT                                                     NOTES  STATUS/DETAILS   OFFICE NOTE from other specialist   yes, 2022 -- Al Morocho MD -- [facility]     2022, 2022, 03/10/2020 -- Tabitha Chahal APRN CNP -- [facility]    2022 -- Jackie Dickson MD -- [facility]     more in EPIC   MEDICATION LIST  yes   LABS     URINALYSIS (UA)  yes   PROSTATE CANCER     PET PSMA eyes to thigh  yes, 2022   IMAGES  yes, 2021 -- XR ABD/KUB  2021 -- CT ABD PELVIS   PATHOLOGY REPORT & SLIDES  yes, 10/13/2010   PSA (LAB)  yes, 2022, 2022, 2021     PRE-VISIT CHECKLIST      Record collection complete yes   Appointment appropriately scheduled           (right time/right provider) Yes   Joint diagnostic appointment coordinated correctly          (ensure right order & amount of time) Yes   MyChart activation Yes   Questionnaire complete If no, please explain pending

## 2022-03-07 NOTE — TELEPHONE ENCOUNTER
Reason for visit: consult     Dx/Hx/Sx: prostate cancer     Records/imaging/labs/orders: in epic    At Rooming: video visit

## 2022-03-21 ENCOUNTER — PATIENT OUTREACH (OUTPATIENT)
Dept: ONCOLOGY | Facility: CLINIC | Age: 57
End: 2022-03-21

## 2022-03-21 ENCOUNTER — VIRTUAL VISIT (OUTPATIENT)
Dept: UROLOGY | Facility: CLINIC | Age: 57
End: 2022-03-21
Payer: COMMERCIAL

## 2022-03-21 DIAGNOSIS — C61 PROSTATE CANCER (H): Primary | ICD-10-CM

## 2022-03-21 PROCEDURE — 99204 OFFICE O/P NEW MOD 45 MIN: CPT | Mod: GT | Performed by: UROLOGY

## 2022-03-21 ASSESSMENT — PATIENT HEALTH QUESTIONNAIRE - PHQ9
10. IF YOU CHECKED OFF ANY PROBLEMS, HOW DIFFICULT HAVE THESE PROBLEMS MADE IT FOR YOU TO DO YOUR WORK, TAKE CARE OF THINGS AT HOME, OR GET ALONG WITH OTHER PEOPLE: NOT DIFFICULT AT ALL
SUM OF ALL RESPONSES TO PHQ QUESTIONS 1-9: 3
SUM OF ALL RESPONSES TO PHQ QUESTIONS 1-9: 3

## 2022-03-21 NOTE — PROGRESS NOTES
Carlos is a 56 year old who is being evaluated via a billable video visit.      How would you like to obtain your AVS? MyChart  If the video visit is dropped, the invitation should be resent by: Send to e-mail at: [email or web address]  Will anyone else be joining your video visit? No      Video Start Time: 1:48pm  Video-Visit Details  CC: prostate cancer    HPI: 57 yo male with Gl 4+4=8 prostatae cancer s/p RALP 12/6/10 with final path showing pT2bN0 pos mar.  He had a PSA recurrence and underwent salvage XRT with hormones finishing in September 2014 (hormones).  He developed another PSA recurrence of 0.2 ng/mL on 3/3/20 which maddy to 1.49 ng/mL on 2/17/22.  He had a PSMA PET showing a amy mass on the right common iliac vein positive for mets.  He has been counseled on options and comes in  today to discuss metastasectomy.    PMHx: prostate cancer, urethral stricture  PSHx: RALP  MEDS: wellbutrin, flonase, crestor  NKDA  FHx:N/A  SocHx: tobac positive, but quit 10 years ago.  EtOH: 2/day  ROS: neg for f/c/s, n/v, wt changes    OBJECTIVE: PSMA PET from 2/17/22 showing 13 mm right iliac LN; indeterminant liver lesion    ASSESSMENT AND PLAN: Over half of today's 50-minute visit was spent reviewing the chart, results and counseling the patient regarding his prostate cancer.  We discussed the risks and benefits of surgery to remove this lymph node.  I counseled the patient that removal of the node may not be curative, but could possibly delay the start of hormone therapy.  We discussed risks including damage to surrounding structures including blood vessels with subsequent bleed, or failure to find the mass.  However, we are optimistic that we would likely be able to remove the mass without too much trouble.  He will think about his options and will let us know how he wishes to move forward.  Type of service:  Video Visit    Video End Time:2:36pm    Originating Location (pt. Location): Home    Distant Location (provider  location):  [facility] CLIN[facility] [place]     Platform used for Video Visit: [employer]

## 2022-03-21 NOTE — LETTER
3/21/2022       RE: Duglas Gao  [address]  [facility] [address]     Dear Colleague,    Thank you for referring your patient, Duglas Gao, to the [facility] [place] [facility]. Please see a copy of my visit note below.    Carlos is a 56 year old who is being evaluated via a billable video visit.      How would you like to obtain your AVS? MyChart  If the video visit is dropped, the invitation should be resent by: Send to e-mail at: [email or web address]  Will anyone else be joining your video visit? No      Video Start Time: 1:48pm  Video-Visit Details  CC: prostate cancer    HPI: 55 yo male with Gl 4+4=8 prostatae cancer s/p RALP 12/6/10 with final path showing pT2bN0 pos mar.  He had a PSA recurrence and underwent salvage XRT with hormones finishing in September 2014 (hormones).  He developed another PSA recurrence of 0.2 ng/mL on 3/3/20 which maddy to 1.49 ng/mL on 2/17/22.  He had a PSMA PET showing a amy mass on the right common iliac vein positive for mets.  He has been counseled on options and comes in  today to discuss metastasectomy.    PMHx: prostate cancer, urethral stricture  PSHx: RALP  MEDS: wellbutrin, flonase, crestor  NKDA  FHx:N/A  SocHx: tobac positive, but quit 10 years ago.  EtOH: 2/day  ROS: neg for f/c/s, n/v, wt changes    OBJECTIVE: PSMA PET from 2/17/22 showing 13 mm right iliac LN; indeterminant liver lesion    ASSESSMENT AND PLAN: Over half of today's 50-minute visit was spent reviewing the chart, results and counseling the patient regarding his prostate cancer.  We discussed the risks and benefits of surgery to remove this lymph node.  I counseled the patient that removal of the node may not be curative, but could possibly delay the start of hormone therapy.  We discussed risks including damage to surrounding structures including blood vessels with subsequent bleed, or failure to find the mass.   However, we are optimistic that we would likely be able to remove the mass without too much trouble.  He will think about his options and will let us know how he wishes to move forward.  Type of service:  Video Visit    Video End Time:2:36pm    Originating Location (pt. Location): Home    Distant Location (provider location):  [facility]     Platform used for Video Visit: [employer]    Sincerely,    Filippo Antoine MD

## 2022-03-21 NOTE — NURSING NOTE
Chief Complaint   Patient presents with     Consult     prostate cancer       Patient Active Problem List   Diagnosis     Allergic rhinitis     FAMILY HX GI MALIGNANCY     CTS (carpal tunnel syndrome)     HYPERLIPIDEMIA LDL GOAL <160     Prostate cancer (H)     Dysthymia     Anxiety     Erectile dysfunction     Elevated triglycerides with high cholesterol     Urethral stricture       Allergies   Allergen Reactions     No Known Drug Allergies        Current Outpatient Medications   Medication Sig Dispense Refill     BuPROPion HCl (WELLBUTRIN PO) Take 300 mg by mouth daily       fluticasone (FLONASE) 50 MCG/ACT nasal spray USE 2 SPRAYS INTO BOTH NOSTRILS DAILY (Patient not taking: Reported on 4/23/2018) 48 g 0     Ibuprofen (ADVIL PO)  (Patient not taking: Reported on 2/23/2022)       rosuvastatin (CRESTOR) 10 MG tablet Take 10 mg by mouth         Social History     Tobacco Use     Smoking status: Former Smoker     Smokeless tobacco: Never Used   Substance Use Topics     Alcohol use: Yes     Drug use: No       Jigna Quinonez LPN  3/21/2022  11:57 AM

## 2022-03-23 ENCOUNTER — TELEPHONE (OUTPATIENT)
Dept: UROLOGY | Facility: CLINIC | Age: 57
End: 2022-03-23
Payer: COMMERCIAL

## 2022-03-23 NOTE — CONFIDENTIAL NOTE
Patient called and wanted to discuss his recent office visit with Dr. Antoine. He has some questions about what was written in Dr. Antoine's note. Patient also wanted to discuss possibly wanting to move forward with surgery scheduling but wanted a call back to discuss his office visit first.

## 2022-03-29 ENCOUNTER — MYC MEDICAL ADVICE (OUTPATIENT)
Dept: UROLOGY | Facility: CLINIC | Age: 57
End: 2022-03-29
Payer: COMMERCIAL

## 2022-03-29 ENCOUNTER — PREP FOR PROCEDURE (OUTPATIENT)
Dept: UROLOGY | Facility: CLINIC | Age: 57
End: 2022-03-29
Payer: COMMERCIAL

## 2022-03-29 DIAGNOSIS — K76.9 LIVER LESION: Primary | ICD-10-CM

## 2022-03-29 DIAGNOSIS — C61 PROSTATE CANCER (H): Primary | ICD-10-CM

## 2022-03-29 RX ORDER — CEFAZOLIN SODIUM 2 G/50ML
2 SOLUTION INTRAVENOUS SEE ADMIN INSTRUCTIONS
Status: CANCELLED | OUTPATIENT
Start: 2022-03-29

## 2022-03-29 RX ORDER — CEFAZOLIN SODIUM 2 G/50ML
2 SOLUTION INTRAVENOUS
Status: CANCELLED | OUTPATIENT
Start: 2022-03-29

## 2022-03-31 DIAGNOSIS — C61 PROSTATE CANCER (H): Primary | ICD-10-CM

## 2022-03-31 DIAGNOSIS — Z11.59 ENCOUNTER FOR SCREENING FOR OTHER VIRAL DISEASES: Primary | ICD-10-CM

## 2022-03-31 NOTE — CONFIDENTIAL NOTE
Patient is scheduled for surgery with Dr. Antoine    Spoke with: Patient via [employer]     Date of Surgery: Friday 04/29/22    Location: [facility] OR     Informed patient they will need an adult  Yes    Pre op with Provider radha    H&P: Scheduled with PAC in person visit Friday 04/15/22    Pre-procedure COVID-19 Test: Tuesday 04/26/22 [facility] lab     Additional imaging/appointments: radha    Surgery packet: mailed to patient 04/01/22     Additional comments: radha

## 2022-03-31 NOTE — TELEPHONE ENCOUNTER
FUTURE VISIT INFORMATION      SURGERY INFORMATION:    Date: 22    Location: ur or    Surgeon:  Filippo Antoine MD    Anesthesia Type:  general    Procedure: ROBOT-ASSISTED LAPAROSCOPIC RETROPERITONEAL LYMPH NODE DISSECTION    Consult:virtual visit 3/21    RECORDS REQUESTED FROM:       Primary Care Provider: Siddhartha Hill MD- [facility]    Most recent EKG+ Tracin21- [facility]

## 2022-04-05 DIAGNOSIS — F40.240 CLAUSTROPHOBIA: Primary | ICD-10-CM

## 2022-04-05 RX ORDER — DIAZEPAM 10 MG
TABLET ORAL
Qty: 1 TABLET | Refills: 0
Start: 2022-04-05 | End: 2022-09-12

## 2022-04-05 NOTE — TELEPHONE ENCOUNTER
Paulette Anaya, RN  You 14 minutes ago (11:59 AM)     JT    Can you please call in the Valium for him? Thank you      Writer called in Rx at [facility].

## 2022-04-06 NOTE — TELEPHONE ENCOUNTER
FUTURE VISIT INFORMATION        SURGERY INFORMATION:    Date: 22    Location: ur or    Surgeon:  Filippo Antoine MD    Anesthesia Type:  general    Procedure: ROBOT-ASSISTED LAPAROSCOPIC RETROPERITONEAL LYMPH NODE DISSECTION    Consult:virtual visit 3/21     RECORDS REQUESTED FROM:         Primary Care Provider: Siddhartha Hill MD- [facility]     Most recent EKG+ Tracin21- [facility]

## 2022-04-15 ENCOUNTER — PRE VISIT (OUTPATIENT)
Dept: SURGERY | Facility: CLINIC | Age: 57
End: 2022-04-15

## 2022-04-19 ENCOUNTER — PRE VISIT (OUTPATIENT)
Dept: SURGERY | Facility: CLINIC | Age: 57
End: 2022-04-19

## 2022-04-19 ENCOUNTER — ANESTHESIA EVENT (OUTPATIENT)
Dept: SURGERY | Facility: CLINIC | Age: 57
End: 2022-04-19
Payer: COMMERCIAL

## 2022-04-19 ENCOUNTER — LAB (OUTPATIENT)
Dept: LAB | Facility: CLINIC | Age: 57
End: 2022-04-19
Payer: COMMERCIAL

## 2022-04-19 ENCOUNTER — OFFICE VISIT (OUTPATIENT)
Dept: SURGERY | Facility: CLINIC | Age: 57
End: 2022-04-19
Payer: COMMERCIAL

## 2022-04-19 VITALS
HEIGHT: 71 IN | WEIGHT: 232 LBS | RESPIRATION RATE: 16 BRPM | HEART RATE: 58 BPM | OXYGEN SATURATION: 95 % | BODY MASS INDEX: 32.48 KG/M2 | TEMPERATURE: 97.9 F | SYSTOLIC BLOOD PRESSURE: 133 MMHG | DIASTOLIC BLOOD PRESSURE: 86 MMHG

## 2022-04-19 DIAGNOSIS — Z01.818 PRE-OP EVALUATION: Primary | ICD-10-CM

## 2022-04-19 DIAGNOSIS — Z01.818 PRE-OP EVALUATION: ICD-10-CM

## 2022-04-19 LAB
ABO/RH(D): NORMAL
ANTIBODY SCREEN: NEGATIVE
ERYTHROCYTE [DISTWIDTH] IN BLOOD BY AUTOMATED COUNT: 12.5 % (ref 10–15)
HCT VFR BLD AUTO: 46.4 % (ref 40–53)
HGB BLD-MCNC: 15.8 G/DL (ref 13.3–17.7)
MCH RBC QN AUTO: 30.4 PG (ref 26.5–33)
MCHC RBC AUTO-ENTMCNC: 34.1 G/DL (ref 31.5–36.5)
MCV RBC AUTO: 89 FL (ref 78–100)
PLATELET # BLD AUTO: 176 10E3/UL (ref 150–450)
RBC # BLD AUTO: 5.19 10E6/UL (ref 4.4–5.9)
SPECIMEN EXPIRATION DATE: NORMAL
WBC # BLD AUTO: 5.4 10E3/UL (ref 4–11)

## 2022-04-19 PROCEDURE — 99000 SPECIMEN HANDLING OFFICE-LAB: CPT | Performed by: PATHOLOGY

## 2022-04-19 PROCEDURE — 86850 RBC ANTIBODY SCREEN: CPT | Mod: 90 | Performed by: PATHOLOGY

## 2022-04-19 PROCEDURE — 85027 COMPLETE CBC AUTOMATED: CPT | Performed by: PATHOLOGY

## 2022-04-19 PROCEDURE — 86901 BLOOD TYPING SEROLOGIC RH(D): CPT | Mod: 90 | Performed by: PATHOLOGY

## 2022-04-19 PROCEDURE — 86900 BLOOD TYPING SEROLOGIC ABO: CPT | Mod: 90 | Performed by: PATHOLOGY

## 2022-04-19 PROCEDURE — 99203 OFFICE O/P NEW LOW 30 MIN: CPT | Performed by: PHYSICIAN ASSISTANT

## 2022-04-19 PROCEDURE — 36415 COLL VENOUS BLD VENIPUNCTURE: CPT | Performed by: PATHOLOGY

## 2022-04-19 ASSESSMENT — LIFESTYLE VARIABLES: TOBACCO_USE: 0

## 2022-04-19 ASSESSMENT — PAIN SCALES - GENERAL: PAINLEVEL: NO PAIN (0)

## 2022-04-19 NOTE — H&P (VIEW-ONLY)
Pre-Operative H & P     CC:  Preoperative exam to assess for increased cardiopulmonary risk while undergoing surgery and anesthesia.    Date of Encounter: 4/19/2022  Primary Care Physician:  Siddhartha Hill     Reason for visit:   Encounter Diagnosis   Name Primary?     Pre-op evaluation Yes       HPI  Duglas Gao is a 56 year old male who presents for pre-operative H & P in preparation for  Procedure Information     Case: [id number] Date/Time: 04/29/22 0730    Procedure: ROBOT-ASSISTED LAPAROSCOPIC RETROPERITONEAL LYMPH NODE DISSECTION (N/A Abdomen)    Anesthesia type: General    Diagnosis: Prostate cancer (H) [C61]    Pre-op diagnosis: Prostate cancer (H) [C61]    [facility]: [facility]    Providers: Filippo Antoine MD          Patient is being evaluated for comorbid conditions of Dyslipidemia, allergic rhinitis, anxiety    Mr. Gao has a history of prostate cancer s/p RALP. He has a PSA recurrence and is s/p salvage XRT with hormones, completed in 2014. He then developed another recurrence 3/2020. PSA was 1.49 2/17/22. He underwent PET that revealed a amy mass on the right common iliac vein positive for mets. He was seen by Dr. Antoine for further evaluation. The above procedure is now scheduled.     History is obtained from the patient and chart review    Hx of abnormal bleeding or anti-platelet use: denies      Past Medical History  Past Medical History:   Diagnosis Date     Allergic rhinitis, cause unspecified      Prostate cancer (H)        Past Surgical History  Past Surgical History:   Procedure Laterality Date     DAVINCI PROSTATECTOMY       LITHOTRIPSY         Prior to Admission Medications  Current Outpatient Medications   Medication Sig Dispense Refill     BuPROPion HCl (WELLBUTRIN PO) Take 300 mg by mouth every morning       Ibuprofen (ADVIL PO) Take by mouth as needed       rosuvastatin (CRESTOR) 10 MG tablet Take 10 mg by mouth every morning       diazepam (VALIUM) 10 MG  tablet Take one tablet 30 min prior to the procedure. Do not drive after taking this medication. (Patient not taking: Reported on 4/19/2022) 1 tablet 0     fluticasone (FLONASE) 50 MCG/ACT nasal spray USE 2 SPRAYS INTO BOTH NOSTRILS DAILY (Patient not taking: No sig reported) 48 g 0       Allergies  Allergies   Allergen Reactions     No Known Drug Allergies        Social History  Social History     Socioeconomic History     Marital status:      Spouse name: Not on file     Number of children: Not on file     Years of education: Not on file     Highest education level: Not on file   Occupational History     Not on file   Tobacco Use     Smoking status: Former Smoker     Smokeless tobacco: Never Used   Substance and Sexual Activity     Alcohol use: Yes     Comment: 2-3 drinks/ day     Drug use: No     Sexual activity: Yes     Partners: Male   Other Topics Concern      Service No     Blood Transfusions No     Caffeine Concern No     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 Not Asked   Social History Narrative    Social Documentation:7/10        Balanced Diet: YES    Calcium intake: milk and food per day,mtv    Caffeine: 2-3 cup per day    Exercise:  type of activity varies;  2-3 times per week    Sunscreen: Yes    Seatbelts:  Yes    Self Breast Exam:  No - na    Self Testicular Exam: Yes    Physical/Emotional/Sexual Abuse: No -     Do you feel safe in your environment? Yes        Cholesterol screen up to date: Yes    Eye Exam up to date: Yes    Dental Exam up to date: yes    Pap smear up to date: Does Not Apply    Mammogram up to date: Does Not Apply    Dexa Scan up to date: Does Not Apply    Colonoscopy up to date: summer 2007     Immunizations up to date: Yes    Glucose screen if over 40:  Yes    Fletcher Bruno ma                 Social Determinants of Health     Financial Resource Strain: Not on file   Food Insecurity: Not on file   Transportation Needs: Not on file   Physical Activity: Not on file   Stress: Not on file   Social Connections: Not on file   Intimate Partner Violence: Not on file   Housing Stability: Not on file       Family History  Family History   Problem Relation Age of Onset     Cancer - colorectal Mother         having chemo at age66     Arthritis Mother      Depression Mother      Anesthesia Reaction Mother         post op delerium     Alcohol/Drug Father         alcohol     Arthritis Maternal Grandmother      Osteoporosis Maternal Grandmother      Breast Cancer Paternal Grandmother      Alcohol/Drug Paternal Grandfather      Deep Vein Thrombosis (DVT) No family hx of        Review of Systems  The complete review of systems is negative other than noted in the HPI or here.   Anesthesia Evaluation   Pt has had prior anesthetic.     History of anesthetic complications   post operative agitation in PACU.    ROS/MED HX  ENT/Pulmonary:     (+) asthma (childhood asthma, no recent symptoms )  (-) tobacco use and STUART risk factors   Neurologic:  - neg neurologic ROS     Cardiovascular:     (+) Dyslipidemia -----Previous cardiac testing   Echo: Date: Results:    Stress Test: Date: Results:    ECG Reviewed: Date: 5/4/21 Results:  Sinus bradycardia  Cath: Date: Results:   (-) taking anticoagulants/antiplatelets   METS/Exercise Tolerance:  Comment: Walking 2 dogs, average 10-15 miles for walks weekly   Hematologic:  - neg hematologic  ROS  (-) history of blood clots and history of blood transfusion   Musculoskeletal:  - neg musculoskeletal ROS     GI/Hepatic:     (+) GERD (intermittent),     Renal/Genitourinary: Comment: S/p prostatectomy      Endo:  - neg endo ROS  (-) chronic steroid usage   Psychiatric/Substance Use:     (+)  "psychiatric history anxiety     Infectious Disease:  - neg infectious disease ROS     Malignancy:   (+) Malignancy, History of Prostate.Prostate CA status post Surgery and Radiation.        Other:            /86 (BP Location: Right arm, Patient Position: Sitting, Cuff Size: Adult Large)   Pulse 58   Temp 97.9  F (36.6  C) (Oral)   Resp 16   Ht 1.791 m (5' 10.5\")   Wt 105.2 kg (232 lb)   SpO2 95%   BMI 32.82 kg/m      Physical Exam   Constitutional: Awake, alert, cooperative, no apparent distress, and appears stated age.  Eyes: Pupils equal, round and reactive to light, extra ocular muscles intact, sclera clear, conjunctiva normal.  HENT: Normocephalic, oral pharynx with moist mucus membranes, good dentition  Respiratory: Clear to auscultation bilaterally, no crackles or wheezing.  Cardiovascular: Regular rate and rhythm, normal S1 and S2, and no murmur noted.  Carotidsno bruits. No edema. Palpable pulses to radial arteries.   GI: Normal bowel sounds, soft, non-distended, non-tender  Genitourinary:  deferred  Skin: Warm and dry.    Musculoskeletal: Full ROM of neck. There is no redness, warmth, or swelling of the exposed joints. Gross motor strength is normal.    Neurologic: Awake, alert, oriented to name, place and time. Cranial nerves II-XII are grossly intact. Gait is normal.   Neuropsychiatric: Calm, cooperative. Normal affect.     Prior Labs/Diagnostic Studies   All labs and imaging personally reviewed     2/4/22  Cholesterol 0 - 199 mg/dL 228 [facility] CENTRAL LAB   Triglyceride <=149 mg/dL 188 [facility] LAB   HDL Cholesterol >=40 mg/dL 57  [facility] CENTRAL LAB   LDL, Calculated <130 mg/dL 133 [facility] LAB   Non HDL Chol, Calculated <=159 mg/dL 171 [facility] LAB   Cholesterol/HDL Ratio  4.0       Alkaline Phosphatase 40 - 150 U/L 73    Bilirubin, Total 0.2 - 1.2 mg/dL 0.7    Bilirubin, Direct 0.0 - 0.5 mg/dL 0.2    AST " (SGOT) 10 - 40 U/L 26    ALT (SGPT) 0 - 55 U/L 52    Protein, Total 6.4 - 8.3 g/dL 7.3    Albumin 3.5 - 5.0 g/dL 4.4      Sodium 136 - 145 mmol/L 140  [facility] CENTRAL LAB   Potassium 3.5 - 5.1 mmol/L 4.7  [facility] CENTRAL LAB   Chloride 98 - 109 mmol/L 105  [facility] CENTRAL LAB   CO2 20 - 29 mmol/L 25  [facility] CENTRAL LAB   Anion Gap 7 - 16 mmol/L 10  [facility] LAB   Calcium 8.4 - 10.4 mg/dL 10.1  [facility] LAB   BUN 7 - 26 mg/dL 11  [facility] LAB   Creatinine 0.73 - 1.18 mg/dL 1.07  [facility] LAB   GFR, Estimated >60 mL/min/1.73m2 >60  [facility] LAB   Glucose 70 - 100 mg/dL 92  [facility] LAB         EKG/ stress test - if available please see in ROS above   No results found.  No flowsheet data found.      The patient's records and results personally reviewed by this provider.     Outside records reviewed from: Care Everywhere    LAB/DIAGNOSTIC STUDIES TODAY:  CBC, T&S    Assessment      Duglas Gao is a 56 year old male seen as a PAC referral for risk assessment and optimization for anesthesia.    Plan/Recommendations  Pt will be optimized for the proposed procedure.  See below for details on the assessment, risk, and preoperative recommendations    NEUROLOGY  - No history of TIA, CVA or seizure  -Post Op delirium risk factors:  No risk identified    ENT  - No current airway concerns.  Will need to be reassessed day of surgery.  Mallampati: II  TM: > 3    CARDIAC  - No history of CAD, Hypertension and Afib   -denies cardiac symptoms   - METS (Metabolic Equivalents)  Patient performs 4 or more METS exercise without symptoms            Total Score: 0      RCRI-Low risk: Class 2 0.9% complication rate  *This score is based on   incomplete data *           Total Score: 1*    RCRI: High Risk Surgery        Criteria with incomplete data:    RCRI: Cardiac Risks         PULMONARY  STUART Low Risk            Total  "Score: 2    STUART: Over 50 ys old    STUART: Male      - reports h/o childhood asthma. Denies any recent symptoms  - Tobacco History      History   Smoking Status     Former Smoker   Smokeless Tobacco     Never Used       GI  -reports intermittent GERD symptoms, Tums PRN  PONV Medium Risk  Total Score: 2           1 AN PONV: Patient is not a current smoker    1 AN PONV: Intended Post Op Opioids        /RENAL  - Baseline Creatinine  WNL    ENDOCRINE    - BMI: Estimated body mass index is 32.82 kg/m  as calculated from the following:    Height as of this encounter: 1.791 m (5' 10.5\").    Weight as of this encounter: 105.2 kg (232 lb).  Obesity (BMI >30)  - No history of Diabetes Mellitus    HEME  VTE Medium Risk 1.8%            Total Score: 7    VTE: Male    VTE: Current cancer      - No history of abnormal bleeding or antiplatelet use.    PSYCH  - anxiety using bupropion, stable  -drinks 2-3 drinks daily. Reports h/o some withdrawal symptoms- plans not to drink a few days prior to surgery    The patient is optimized for their procedure. AVS with information on surgery time/arrival time, meds and NPO status given by nursing staff. No further diagnostic testing indicated.      On the day of service:     Prep time: 16 minutes  Visit time: 18 minutes  Documentation time: 6 minutes  ------------------------------------------  Total time: 40 minutes      Marily Nelson PA-C  Preoperative Assessment Center  [facility]  [facility] and [facility] Center  Phone: [phone]  Fax: [phone]  "

## 2022-04-19 NOTE — H&P
Pre-Operative H & P     CC:  Preoperative exam to assess for increased cardiopulmonary risk while undergoing surgery and anesthesia.    Date of Encounter: 4/19/2022  Primary Care Physician:  Siddhartha Hill     Reason for visit:   Encounter Diagnosis   Name Primary?     Pre-op evaluation Yes       HPI  Duglas Gao is a 56 year old male who presents for pre-operative H & P in preparation for  Procedure Information     Case: [id number] Date/Time: 04/29/22 0730    Procedure: ROBOT-ASSISTED LAPAROSCOPIC RETROPERITONEAL LYMPH NODE DISSECTION (N/A Abdomen)    Anesthesia type: General    Diagnosis: Prostate cancer (H) [C61]    Pre-op diagnosis: Prostate cancer (H) [C61]    [facility]    Providers: Filippo Antoine MD          Patient is being evaluated for comorbid conditions of Dyslipidemia, allergic rhinitis, anxiety    Mr. Gao has a history of prostate cancer s/p RALP. He has a PSA recurrence and is s/p salvage XRT with hormones, completed in 2014. He then developed another recurrence 3/2020. PSA was 1.49 2/17/22. He underwent PET that revealed a amy mass on the right common iliac vein positive for mets. He was seen by Dr. Antoine for further evaluation. The above procedure is now scheduled.     History is obtained from the patient and chart review    Hx of abnormal bleeding or anti-platelet use: denies      Past Medical History  Past Medical History:   Diagnosis Date     Allergic rhinitis, cause unspecified      Prostate cancer (H)        Past Surgical History  Past Surgical History:   Procedure Laterality Date     DAVINCI PROSTATECTOMY       LITHOTRIPSY         Prior to Admission Medications  Current Outpatient Medications   Medication Sig Dispense Refill     BuPROPion HCl (WELLBUTRIN PO) Take 300 mg by mouth every morning       Ibuprofen (ADVIL PO) Take by mouth as needed       rosuvastatin (CRESTOR) 10 MG tablet Take 10 mg by mouth every morning       diazepam (VALIUM) 10 MG  tablet Take one tablet 30 min prior to the procedure. Do not drive after taking this medication. (Patient not taking: Reported on 4/19/2022) 1 tablet 0     fluticasone (FLONASE) 50 MCG/ACT nasal spray USE 2 SPRAYS INTO BOTH NOSTRILS DAILY (Patient not taking: No sig reported) 48 g 0       Allergies  Allergies   Allergen Reactions     No Known Drug Allergies        Social History  Social History     Socioeconomic History     Marital status:      Spouse name: Not on file     Number of children: Not on file     Years of education: Not on file     Highest education level: Not on file   Occupational History     Not on file   Tobacco Use     Smoking status: Former Smoker     Smokeless tobacco: Never Used   Substance and Sexual Activity     Alcohol use: Yes     Comment: 2-3 drinks/ day     Drug use: No     Sexual activity: Yes     Partners: Male   Other Topics Concern      Service No     Blood Transfusions No     Caffeine Concern No     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 Not Asked   Social History Narrative    Social Documentation:7/10        Balanced Diet: YES    Calcium intake: milk and food per day,mtv    Caffeine: 2-3 cup per day    Exercise:  type of activity varies;  2-3 times per week    Sunscreen: Yes    Seatbelts:  Yes    Self Breast Exam:  No - na    Self Testicular Exam: Yes    Physical/Emotional/Sexual Abuse: No -     Do you feel safe in your environment? Yes        Cholesterol screen up to date: Yes    Eye Exam up to date: Yes    Dental Exam up to date: yes    Pap smear up to date: Does Not Apply    Mammogram up to date: Does Not Apply    Dexa Scan up to date: Does Not Apply    Colonoscopy up to date: summer 2007     Immunizations up to date: Yes    Glucose screen if over 40:  Yes    Fletcher Bruno ma                 Social Determinants of Health     Financial Resource Strain: Not on file   Food Insecurity: Not on file   Transportation Needs: Not on file   Physical Activity: Not on file   Stress: Not on file   Social Connections: Not on file   Intimate Partner Violence: Not on file   Housing Stability: Not on file       Family History  Family History   Problem Relation Age of Onset     Cancer - colorectal Mother         having chemo at age68     Arthritis Mother      Depression Mother      Anesthesia Reaction Mother         post op delerium     Alcohol/Drug Father         alcohol     Arthritis Maternal Grandmother      Osteoporosis Maternal Grandmother      Breast Cancer Paternal Grandmother      Alcohol/Drug Paternal Grandfather      Deep Vein Thrombosis (DVT) No family hx of        Review of Systems  The complete review of systems is negative other than noted in the HPI or here.   Anesthesia Evaluation   Pt has had prior anesthetic.     History of anesthetic complications   post operative agitation in PACU.    ROS/MED HX  ENT/Pulmonary:     (+) asthma (childhood asthma, no recent symptoms )  (-) tobacco use and STUART risk factors   Neurologic:  - neg neurologic ROS     Cardiovascular:     (+) Dyslipidemia -----Previous cardiac testing   Echo: Date: Results:    Stress Test: Date: Results:    ECG Reviewed: Date: 5/4/21 Results:  Sinus bradycardia  Cath: Date: Results:   (-) taking anticoagulants/antiplatelets   METS/Exercise Tolerance:  Comment: Walking 2 dogs, average 10-15 miles for walks weekly   Hematologic:  - neg hematologic  ROS  (-) history of blood clots and history of blood transfusion   Musculoskeletal:  - neg musculoskeletal ROS     GI/Hepatic:     (+) GERD (intermittent),     Renal/Genitourinary: Comment: S/p prostatectomy      Endo:  - neg endo ROS  (-) chronic steroid usage   Psychiatric/Substance Use:     (+)  "psychiatric history anxiety     Infectious Disease:  - neg infectious disease ROS     Malignancy:   (+) Malignancy, History of Prostate.Prostate CA status post Surgery and Radiation.        Other:            /86 (BP Location: Right arm, Patient Position: Sitting, Cuff Size: Adult Large)   Pulse 58   Temp 97.9  F (36.6  C) (Oral)   Resp 16   Ht 1.791 m (5' 10.5\")   Wt 105.2 kg (232 lb)   SpO2 95%   BMI 32.82 kg/m      Physical Exam   Constitutional: Awake, alert, cooperative, no apparent distress, and appears stated age.  Eyes: Pupils equal, round and reactive to light, extra ocular muscles intact, sclera clear, conjunctiva normal.  HENT: Normocephalic, oral pharynx with moist mucus membranes, good dentition  Respiratory: Clear to auscultation bilaterally, no crackles or wheezing.  Cardiovascular: Regular rate and rhythm, normal S1 and S2, and no murmur noted.  Carotidsno bruits. No edema. Palpable pulses to radial arteries.   GI: Normal bowel sounds, soft, non-distended, non-tender  Genitourinary:  deferred  Skin: Warm and dry.    Musculoskeletal: Full ROM of neck. There is no redness, warmth, or swelling of the exposed joints. Gross motor strength is normal.    Neurologic: Awake, alert, oriented to name, place and time. Cranial nerves II-XII are grossly intact. Gait is normal.   Neuropsychiatric: Calm, cooperative. Normal affect.     Prior Labs/Diagnostic Studies   All labs and imaging personally reviewed     2/4/22  Cholesterol 0 - 199 mg/dL 228 [facility] CENTRAL LAB   Triglyceride <=149 mg/dL 188 [facility] LAB   HDL Cholesterol >=40 mg/dL 57  [facility] CENTRAL LAB   LDL, Calculated <130 mg/dL 133 [facility] LAB   Non HDL Chol, Calculated <=159 mg/dL 171 [facility] LAB   Cholesterol/HDL Ratio  4.0       Alkaline Phosphatase 40 - 150 U/L 73    Bilirubin, Total 0.2 - 1.2 mg/dL 0.7    Bilirubin, Direct 0.0 - 0.5 mg/dL 0.2    AST " (SGOT) 10 - 40 U/L 26    ALT (SGPT) 0 - 55 U/L 52    Protein, Total 6.4 - 8.3 g/dL 7.3    Albumin 3.5 - 5.0 g/dL 4.4      Sodium 136 - 145 mmol/L 140  [facility] CENTRAL LAB   Potassium 3.5 - 5.1 mmol/L 4.7  [facility] CENTRAL LAB   Chloride 98 - 109 mmol/L 105  [facility] CENTRAL LAB   CO2 20 - 29 mmol/L 25  [facility] CENTRAL LAB   Anion Gap 7 - 16 mmol/L 10  [facility] LAB   Calcium 8.4 - 10.4 mg/dL 10.1  [facility] LAB   BUN 7 - 26 mg/dL 11  [facility] LAB   Creatinine 0.73 - 1.18 mg/dL 1.07  [facility] LAB   GFR, Estimated >60 mL/min/1.73m2 >60  [facility] LAB   Glucose 70 - 100 mg/dL 92  [facility] LAB         EKG/ stress test - if available please see in ROS above   No results found.  No flowsheet data found.      The patient's records and results personally reviewed by this provider.     Outside records reviewed from: Care Everywhere    LAB/DIAGNOSTIC STUDIES TODAY:  CBC, T&S    Assessment      Duglas Gao is a 56 year old male seen as a PAC referral for risk assessment and optimization for anesthesia.    Plan/Recommendations  Pt will be optimized for the proposed procedure.  See below for details on the assessment, risk, and preoperative recommendations    NEUROLOGY  - No history of TIA, CVA or seizure  -Post Op delirium risk factors:  No risk identified    ENT  - No current airway concerns.  Will need to be reassessed day of surgery.  Mallampati: II  TM: > 3    CARDIAC  - No history of CAD, Hypertension and Afib   -denies cardiac symptoms   - METS (Metabolic Equivalents)  Patient performs 4 or more METS exercise without symptoms            Total Score: 0      RCRI-Low risk: Class 2 0.9% complication rate  *This score is based on   incomplete data *           Total Score: 1*    RCRI: High Risk Surgery        Criteria with incomplete data:    RCRI: Cardiac Risks         PULMONARY  STUART Low Risk            Total  "Score: 2    STUART: Over 50 ys old    STUART: Male      - reports h/o childhood asthma. Denies any recent symptoms  - Tobacco History      History   Smoking Status     Former Smoker   Smokeless Tobacco     Never Used       GI  -reports intermittent GERD symptoms, Tums PRN  PONV Medium Risk  Total Score: 2           1 AN PONV: Patient is not a current smoker    1 AN PONV: Intended Post Op Opioids        /RENAL  - Baseline Creatinine  WNL    ENDOCRINE    - BMI: Estimated body mass index is 32.82 kg/m  as calculated from the following:    Height as of this encounter: 1.791 m (5' 10.5\").    Weight as of this encounter: 105.2 kg (232 lb).  Obesity (BMI >30)  - No history of Diabetes Mellitus    HEME  VTE Medium Risk 1.8%            Total Score: 7    VTE: Male    VTE: Current cancer      - No history of abnormal bleeding or antiplatelet use.    PSYCH  - anxiety using bupropion, stable  -drinks 2-3 drinks daily. Reports h/o some withdrawal symptoms- plans not to drink a few days prior to surgery    The patient is optimized for their procedure. AVS with information on surgery time/arrival time, meds and NPO status given by nursing staff. No further diagnostic testing indicated.      On the day of service:     Prep time: 16 minutes  Visit time: 18 minutes  Documentation time: 6 minutes  ------------------------------------------  Total time: 40 minutes      Marily Nelson PA-C  Preoperative Assessment Center  [facility]  Clinic and Surgery Center  Phone: [phone]  Fax: [phone]  "

## 2022-04-19 NOTE — PATIENT INSTRUCTIONS
Preparing for Your Surgery      Name:  Duglas Gao   MRN:  [id number]   :  1965   Today's Date:  2022       Arriving for surgery:  Surgery date:  2022  Arrival time:  5:30 am    Restrictions due to COVID 19       Effective 22 [facility] is implementing the following visitor policy:     1 person may accompany the patient through the Pre-Op process.      That same person may wait in the Surgery Waiting room, provided there is enough room to social distance         Inpatients are allowed 2 visitors per day for the duration of their stay.        Visitors must wear a mask.      Visitors must not be ill.      Visiting hours are 8 am to 8 pm.    [employer] parking is available for anyone with mobility limitations or disabilities.  ([place]  24 hours/ 7 days a week; [facility]  7 am- 3:30 pm, Mon- Fri)    Please come to:     [facility] [address]  [facility]   [address] [place]  [address]    -Parking is available in the Green Ramp     -Proceed to the 3rd floor, check in at the Adult Surgery Waiting Lounge. [phone]    If an escort is needed stop at the Information Desk in the lobby.     What can I eat or drink?  -  You may eat and drink normally for up to 8 hours before your surgery. (Until 22 at 11 pm)  -  You may have clear liquids until 2 hours before surgery. (Until 5:30 am arrival time)    Examples of clear liquids:  Water  Clear broth  Juices (apple, white grape, white cranberry  and cider) without pulp  Noncarbonated, powder based beverages  (lemonade and Clinton-Aid)  Sodas (Sprite, 7-Up, ginger ale and seltzer)  Coffee or tea (without milk or cream)  Gatorade    -  No Alcohol for at least 24 hours before surgery     Which medicines can I take?    Hold Aspirin for 7 days before surgery.   Hold Multivitamins for 7 days before surgery.  Hold Supplements for 7 days before surgery.  Hold  Ibuprofen (Advil, Motrin) for 1 day before surgery  Hold Naproxen (Aleve) for 4 days before surgery.      -  PLEASE TAKE these medications the day of surgery:  Bupropion  Rosuvastatin      How do I prepare myself?  - Please take 2 showers before surgery using Scrubcare or Hibiclens soap.    Use this soap only from the neck to your toes.     Leave the soap on your skin for one minute--then rinse thoroughly.      You may use your own shampoo and conditioner; no other hair products.   - Please remove all jewelry and body piercings.  - No lotions, deodorants or fragrance.  - No makeup or fingernail polish.   - Bring your ID and insurance card.    -If you have a Deep Brain Stimulator, Spinal Cord Stimulator or any neuro stimulator device---you must bring the remote control to the hospital     - All patients are required to have a Covid-19 test within 4 days of surgery/procedure.      -Patients will be contacted by the [facility] scheduling team within 1 week of surgery to make an appointment.      - Patients may call the Scheduling team at [phone] if they have not been scheduled within 4 days of  surgery.      ALL PATIENTS GOING HOME THE SAME DAY OF SURGERY ARE REQUIRED TO HAVE A RESPONSIBLE ADULT TO DRIVE AND BE IN ATTENDANCE WITH THEM FOR 24 HOURS FOLLOWING SURGERY.      Questions or Concerns:    - For any questions regarding the day of surgery or your hospital stay, please contact the Pre Admission Nursing Office at [phone].       - If you have health changes between today and your surgery please call your surgeon.       For questions after surgery please call your surgeons office.

## 2022-04-22 ENCOUNTER — PATIENT OUTREACH (OUTPATIENT)
Dept: UROLOGY | Facility: CLINIC | Age: 57
End: 2022-04-22
Payer: COMMERCIAL

## 2022-04-22 DIAGNOSIS — C61 PROSTATE CANCER (H): Primary | ICD-10-CM

## 2022-04-23 ENCOUNTER — ANCILLARY PROCEDURE (OUTPATIENT)
Dept: MRI IMAGING | Facility: CLINIC | Age: 57
End: 2022-04-23
Attending: UROLOGY
Payer: COMMERCIAL

## 2022-04-23 DIAGNOSIS — K76.9 LIVER LESION: ICD-10-CM

## 2022-04-23 PROCEDURE — 74183 MRI ABD W/O CNTR FLWD CNTR: CPT | Performed by: RADIOLOGY

## 2022-04-23 PROCEDURE — A9585 GADOBUTROL INJECTION: HCPCS | Performed by: RADIOLOGY

## 2022-04-23 RX ORDER — GADOBUTROL 604.72 MG/ML
10 INJECTION INTRAVENOUS ONCE
Status: COMPLETED | OUTPATIENT
Start: 2022-04-23 | End: 2022-04-23

## 2022-04-23 RX ADMIN — GADOBUTROL 10 ML: 604.72 INJECTION INTRAVENOUS at 09:55

## 2022-04-23 NOTE — DISCHARGE INSTRUCTIONS

## 2022-04-26 ENCOUNTER — LAB (OUTPATIENT)
Dept: LAB | Facility: CLINIC | Age: 57
End: 2022-04-26
Attending: UROLOGY
Payer: COMMERCIAL

## 2022-04-26 DIAGNOSIS — Z11.59 ENCOUNTER FOR SCREENING FOR OTHER VIRAL DISEASES: ICD-10-CM

## 2022-04-26 DIAGNOSIS — C61 PROSTATE CANCER (H): ICD-10-CM

## 2022-04-26 LAB
ALBUMIN UR-MCNC: NEGATIVE MG/DL
APPEARANCE UR: CLEAR
BILIRUB UR QL STRIP: NEGATIVE
COLOR UR AUTO: YELLOW
GLUCOSE UR STRIP-MCNC: NEGATIVE MG/DL
HGB UR QL STRIP: NEGATIVE
KETONES UR STRIP-MCNC: NEGATIVE MG/DL
LEUKOCYTE ESTERASE UR QL STRIP: NEGATIVE
MUCOUS THREADS #/AREA URNS LPF: PRESENT /LPF
NITRATE UR QL: NEGATIVE
PH UR STRIP: 7 [PH] (ref 5–7)
RBC URINE: 0 /HPF
SP GR UR STRIP: 1.02 (ref 1–1.03)
UROBILINOGEN UR STRIP-MCNC: NORMAL MG/DL
WBC URINE: 1 /HPF

## 2022-04-26 PROCEDURE — 99000 SPECIMEN HANDLING OFFICE-LAB: CPT | Performed by: PATHOLOGY

## 2022-04-26 PROCEDURE — 81001 URINALYSIS AUTO W/SCOPE: CPT | Performed by: PATHOLOGY

## 2022-04-26 PROCEDURE — U0003 INFECTIOUS AGENT DETECTION BY NUCLEIC ACID (DNA OR RNA); SEVERE ACUTE RESPIRATORY SYNDROME CORONAVIRUS 2 (SARS-COV-2) (CORONAVIRUS DISEASE [COVID-19]), AMPLIFIED PROBE TECHNIQUE, MAKING USE OF HIGH THROUGHPUT TECHNOLOGIES AS DESCRIBED BY CMS-2020-01-R: HCPCS | Mod: 90 | Performed by: PATHOLOGY

## 2022-04-26 PROCEDURE — U0005 INFEC AGEN DETEC AMPLI PROBE: HCPCS | Mod: 90 | Performed by: PATHOLOGY

## 2022-04-27 LAB — SARS-COV-2 RNA RESP QL NAA+PROBE: NEGATIVE

## 2022-04-28 ENCOUNTER — TELEPHONE (OUTPATIENT)
Dept: UROLOGY | Facility: CLINIC | Age: 57
End: 2022-04-28
Payer: COMMERCIAL

## 2022-04-28 NOTE — CONFIDENTIAL NOTE
Spoke to patient and informed him that his surgery arrival time for Dr. Antoine surgery tomorrow 04/29 has been moved up to 5:30 AM. Also informed patient to adjust his NPO guidelines to reflect the change. No solid foods 8 hours prior to the procedure time and can have clear liquids until 2 hours prior to procedure time. Patient acknowledged and agreed with all changes.

## 2022-04-29 ENCOUNTER — PRE VISIT (OUTPATIENT)
Dept: UROLOGY | Facility: CLINIC | Age: 57
End: 2022-04-29

## 2022-04-29 ENCOUNTER — HOSPITAL ENCOUNTER (OUTPATIENT)
Facility: CLINIC | Age: 57
Discharge: HOME | End: 2022-04-29
Attending: UROLOGY | Admitting: UROLOGY
Payer: COMMERCIAL

## 2022-04-29 ENCOUNTER — ANESTHESIA (OUTPATIENT)
Dept: SURGERY | Facility: CLINIC | Age: 57
End: 2022-04-29
Payer: COMMERCIAL

## 2022-04-29 VITALS
HEIGHT: 71 IN | BODY MASS INDEX: 31.98 KG/M2 | TEMPERATURE: 97.9 F | OXYGEN SATURATION: 94 % | SYSTOLIC BLOOD PRESSURE: 111 MMHG | RESPIRATION RATE: 16 BRPM | DIASTOLIC BLOOD PRESSURE: 74 MMHG | HEART RATE: 69 BPM | WEIGHT: 228.4 LBS

## 2022-04-29 DIAGNOSIS — C61 PROSTATE CANCER (H): Primary | ICD-10-CM

## 2022-04-29 LAB
GLUCOSE BLDC GLUCOMTR-MCNC: 98 MG/DL (ref 70–99)
PSA SERPL-MCNC: 1.59 UG/L (ref 0–4)

## 2022-04-29 PROCEDURE — 250N000011 HC RX IP 250 OP 636: Performed by: NURSE ANESTHETIST, CERTIFIED REGISTERED

## 2022-04-29 PROCEDURE — 250N000009 HC RX 250: Performed by: NURSE ANESTHETIST, CERTIFIED REGISTERED

## 2022-04-29 PROCEDURE — 258N000001 HC RX 258: Performed by: UROLOGY

## 2022-04-29 PROCEDURE — 84153 ASSAY OF PSA TOTAL: CPT | Performed by: UROLOGY

## 2022-04-29 PROCEDURE — 258N000003 HC RX IP 258 OP 636: Performed by: NURSE ANESTHETIST, CERTIFIED REGISTERED

## 2022-04-29 PROCEDURE — 88305 TISSUE EXAM BY PATHOLOGIST: CPT | Mod: 26 | Performed by: PATHOLOGY

## 2022-04-29 PROCEDURE — 250N000025 HC SEVOFLURANE, PER MIN: Performed by: UROLOGY

## 2022-04-29 PROCEDURE — 710N000012 HC RECOVERY PHASE 2, PER MINUTE: Performed by: UROLOGY

## 2022-04-29 PROCEDURE — 370N000017 HC ANESTHESIA TECHNICAL FEE, PER MIN: Performed by: UROLOGY

## 2022-04-29 PROCEDURE — 250N000011 HC RX IP 250 OP 636: Performed by: UROLOGY

## 2022-04-29 PROCEDURE — 82962 GLUCOSE BLOOD TEST: CPT

## 2022-04-29 PROCEDURE — 250N000013 HC RX MED GY IP 250 OP 250 PS 637: Performed by: ANESTHESIOLOGY

## 2022-04-29 PROCEDURE — 88331 PATH CONSLTJ SURG 1 BLK 1SPC: CPT | Mod: TC | Performed by: UROLOGY

## 2022-04-29 PROCEDURE — 38589 UNLISTED LAPS PX LYMPHTC SYS: CPT | Mod: GC | Performed by: UROLOGY

## 2022-04-29 PROCEDURE — 272N000001 HC OR GENERAL SUPPLY STERILE: Performed by: UROLOGY

## 2022-04-29 PROCEDURE — 88331 PATH CONSLTJ SURG 1 BLK 1SPC: CPT | Mod: 26 | Performed by: PATHOLOGY

## 2022-04-29 PROCEDURE — 360N000080 HC SURGERY LEVEL 7, PER MIN: Performed by: UROLOGY

## 2022-04-29 PROCEDURE — 710N000010 HC RECOVERY PHASE 1, LEVEL 2, PER MIN: Performed by: UROLOGY

## 2022-04-29 PROCEDURE — 36415 COLL VENOUS BLD VENIPUNCTURE: CPT | Performed by: UROLOGY

## 2022-04-29 PROCEDURE — 250N000011 HC RX IP 250 OP 636: Performed by: ANESTHESIOLOGY

## 2022-04-29 PROCEDURE — 999N000141 HC STATISTIC PRE-PROCEDURE NURSING ASSESSMENT: Performed by: UROLOGY

## 2022-04-29 RX ORDER — SENNA AND DOCUSATE SODIUM 50; 8.6 MG/1; MG/1
1 TABLET, FILM COATED ORAL 2 TIMES DAILY PRN
Qty: 10 TABLET | Refills: 0 | Status: SHIPPED | OUTPATIENT
Start: 2022-04-29 | End: 2022-09-12

## 2022-04-29 RX ORDER — SODIUM CHLORIDE, SODIUM LACTATE, POTASSIUM CHLORIDE, CALCIUM CHLORIDE 600; 310; 30; 20 MG/100ML; MG/100ML; MG/100ML; MG/100ML
INJECTION, SOLUTION INTRAVENOUS CONTINUOUS
Status: DISCONTINUED | OUTPATIENT
Start: 2022-04-29 | End: 2022-04-29 | Stop reason: HOSPADM

## 2022-04-29 RX ORDER — SODIUM CHLORIDE, SODIUM LACTATE, POTASSIUM CHLORIDE, CALCIUM CHLORIDE 600; 310; 30; 20 MG/100ML; MG/100ML; MG/100ML; MG/100ML
INJECTION, SOLUTION INTRAVENOUS CONTINUOUS PRN
Status: DISCONTINUED | OUTPATIENT
Start: 2022-04-29 | End: 2022-04-29

## 2022-04-29 RX ORDER — ONDANSETRON 2 MG/ML
4 INJECTION INTRAMUSCULAR; INTRAVENOUS EVERY 30 MIN PRN
Status: DISCONTINUED | OUTPATIENT
Start: 2022-04-29 | End: 2022-04-29 | Stop reason: HOSPADM

## 2022-04-29 RX ORDER — EPHEDRINE SULFATE 50 MG/ML
INJECTION, SOLUTION INTRAMUSCULAR; INTRAVENOUS; SUBCUTANEOUS PRN
Status: DISCONTINUED | OUTPATIENT
Start: 2022-04-29 | End: 2022-04-29

## 2022-04-29 RX ORDER — DEXAMETHASONE SODIUM PHOSPHATE 4 MG/ML
4 INJECTION, SOLUTION INTRA-ARTICULAR; INTRALESIONAL; INTRAMUSCULAR; INTRAVENOUS; SOFT TISSUE EVERY 10 MIN PRN
Status: DISCONTINUED | OUTPATIENT
Start: 2022-04-29 | End: 2022-04-29 | Stop reason: HOSPADM

## 2022-04-29 RX ORDER — ALBUTEROL SULFATE 0.83 MG/ML
2.5 SOLUTION RESPIRATORY (INHALATION) EVERY 4 HOURS PRN
Status: DISCONTINUED | OUTPATIENT
Start: 2022-04-29 | End: 2022-04-29 | Stop reason: HOSPADM

## 2022-04-29 RX ORDER — CEFAZOLIN SODIUM/WATER 2 G/20 ML
2 SYRINGE (ML) INTRAVENOUS
Status: COMPLETED | OUTPATIENT
Start: 2022-04-29 | End: 2022-04-29

## 2022-04-29 RX ORDER — MEPERIDINE HYDROCHLORIDE 25 MG/ML
12.5 INJECTION INTRAMUSCULAR; INTRAVENOUS; SUBCUTANEOUS
Status: DISCONTINUED | OUTPATIENT
Start: 2022-04-29 | End: 2022-04-29 | Stop reason: HOSPADM

## 2022-04-29 RX ORDER — CEFAZOLIN SODIUM/WATER 2 G/20 ML
2 SYRINGE (ML) INTRAVENOUS SEE ADMIN INSTRUCTIONS
Status: DISCONTINUED | OUTPATIENT
Start: 2022-04-29 | End: 2022-04-29 | Stop reason: HOSPADM

## 2022-04-29 RX ORDER — ONDANSETRON 2 MG/ML
INJECTION INTRAMUSCULAR; INTRAVENOUS PRN
Status: DISCONTINUED | OUTPATIENT
Start: 2022-04-29 | End: 2022-04-29

## 2022-04-29 RX ORDER — OXYCODONE HYDROCHLORIDE 5 MG/1
5 TABLET ORAL EVERY 4 HOURS PRN
Status: DISCONTINUED | OUTPATIENT
Start: 2022-04-29 | End: 2022-04-29 | Stop reason: HOSPADM

## 2022-04-29 RX ORDER — HYDROMORPHONE HCL IN WATER/PF 6 MG/30 ML
0.2 PATIENT CONTROLLED ANALGESIA SYRINGE INTRAVENOUS EVERY 5 MIN PRN
Status: DISCONTINUED | OUTPATIENT
Start: 2022-04-29 | End: 2022-04-29 | Stop reason: HOSPADM

## 2022-04-29 RX ORDER — PROPOFOL 10 MG/ML
INJECTION, EMULSION INTRAVENOUS PRN
Status: DISCONTINUED | OUTPATIENT
Start: 2022-04-29 | End: 2022-04-29

## 2022-04-29 RX ORDER — DEXAMETHASONE SODIUM PHOSPHATE 4 MG/ML
INJECTION, SOLUTION INTRA-ARTICULAR; INTRALESIONAL; INTRAMUSCULAR; INTRAVENOUS; SOFT TISSUE PRN
Status: DISCONTINUED | OUTPATIENT
Start: 2022-04-29 | End: 2022-04-29

## 2022-04-29 RX ORDER — FENTANYL CITRATE 50 UG/ML
INJECTION, SOLUTION INTRAMUSCULAR; INTRAVENOUS PRN
Status: DISCONTINUED | OUTPATIENT
Start: 2022-04-29 | End: 2022-04-29

## 2022-04-29 RX ORDER — LABETALOL HYDROCHLORIDE 5 MG/ML
10 INJECTION, SOLUTION INTRAVENOUS
Status: DISCONTINUED | OUTPATIENT
Start: 2022-04-29 | End: 2022-04-29 | Stop reason: HOSPADM

## 2022-04-29 RX ORDER — FENTANYL CITRATE 50 UG/ML
25 INJECTION, SOLUTION INTRAMUSCULAR; INTRAVENOUS
Status: DISCONTINUED | OUTPATIENT
Start: 2022-04-29 | End: 2022-04-29 | Stop reason: HOSPADM

## 2022-04-29 RX ORDER — ACETAMINOPHEN 325 MG/1
975 TABLET ORAL ONCE
Status: COMPLETED | OUTPATIENT
Start: 2022-04-29 | End: 2022-04-29

## 2022-04-29 RX ORDER — LIDOCAINE HYDROCHLORIDE 20 MG/ML
INJECTION, SOLUTION INFILTRATION; PERINEURAL PRN
Status: DISCONTINUED | OUTPATIENT
Start: 2022-04-29 | End: 2022-04-29

## 2022-04-29 RX ORDER — ONDANSETRON 4 MG/1
4 TABLET, ORALLY DISINTEGRATING ORAL EVERY 30 MIN PRN
Status: DISCONTINUED | OUTPATIENT
Start: 2022-04-29 | End: 2022-04-29 | Stop reason: HOSPADM

## 2022-04-29 RX ORDER — BUPIVACAINE HYDROCHLORIDE 2.5 MG/ML
INJECTION, SOLUTION INFILTRATION; PERINEURAL PRN
Status: DISCONTINUED | OUTPATIENT
Start: 2022-04-29 | End: 2022-04-29 | Stop reason: HOSPADM

## 2022-04-29 RX ORDER — FENTANYL CITRATE 50 UG/ML
25 INJECTION, SOLUTION INTRAMUSCULAR; INTRAVENOUS EVERY 5 MIN PRN
Status: DISCONTINUED | OUTPATIENT
Start: 2022-04-29 | End: 2022-04-29 | Stop reason: HOSPADM

## 2022-04-29 RX ORDER — OXYCODONE HYDROCHLORIDE 5 MG/1
5 TABLET ORAL EVERY 6 HOURS PRN
Qty: 9 TABLET | Refills: 0 | Status: SHIPPED | OUTPATIENT
Start: 2022-04-29 | End: 2022-09-12

## 2022-04-29 RX ORDER — DIAZEPAM 10 MG/2ML
2.5 INJECTION, SOLUTION INTRAMUSCULAR; INTRAVENOUS
Status: DISCONTINUED | OUTPATIENT
Start: 2022-04-29 | End: 2022-04-29 | Stop reason: HOSPADM

## 2022-04-29 RX ADMIN — LIDOCAINE HYDROCHLORIDE 100 MG: 20 INJECTION, SOLUTION INFILTRATION; PERINEURAL at 08:01

## 2022-04-29 RX ADMIN — DEXAMETHASONE SODIUM PHOSPHATE 8 MG: 4 INJECTION, SOLUTION INTRAMUSCULAR; INTRAVENOUS at 08:46

## 2022-04-29 RX ADMIN — ROCURONIUM BROMIDE 20 MG: 50 INJECTION, SOLUTION INTRAVENOUS at 09:42

## 2022-04-29 RX ADMIN — MIDAZOLAM 2 MG: 1 INJECTION INTRAMUSCULAR; INTRAVENOUS at 07:56

## 2022-04-29 RX ADMIN — FENTANYL CITRATE 100 MCG: 50 INJECTION, SOLUTION INTRAMUSCULAR; INTRAVENOUS at 08:01

## 2022-04-29 RX ADMIN — ACETAMINOPHEN 975 MG: 325 TABLET ORAL at 07:15

## 2022-04-29 RX ADMIN — Medication 2 G: at 08:30

## 2022-04-29 RX ADMIN — SODIUM CHLORIDE, POTASSIUM CHLORIDE, SODIUM LACTATE AND CALCIUM CHLORIDE: 600; 310; 30; 20 INJECTION, SOLUTION INTRAVENOUS at 10:36

## 2022-04-29 RX ADMIN — ROCURONIUM BROMIDE 50 MG: 50 INJECTION, SOLUTION INTRAVENOUS at 08:35

## 2022-04-29 RX ADMIN — PROPOFOL 200 MG: 10 INJECTION, EMULSION INTRAVENOUS at 08:01

## 2022-04-29 RX ADMIN — ROCURONIUM BROMIDE 10 MG: 50 INJECTION, SOLUTION INTRAVENOUS at 10:36

## 2022-04-29 RX ADMIN — Medication 10 MG: at 08:52

## 2022-04-29 RX ADMIN — HYDROMORPHONE HYDROCHLORIDE 0.5 MG: 1 INJECTION, SOLUTION INTRAMUSCULAR; INTRAVENOUS; SUBCUTANEOUS at 11:35

## 2022-04-29 RX ADMIN — SUGAMMADEX 200 MG: 100 INJECTION, SOLUTION INTRAVENOUS at 11:50

## 2022-04-29 RX ADMIN — FENTANYL CITRATE 25 MCG: 50 INJECTION INTRAMUSCULAR; INTRAVENOUS at 12:29

## 2022-04-29 RX ADMIN — Medication 10 MG: at 08:49

## 2022-04-29 RX ADMIN — ROCURONIUM BROMIDE 20 MG: 50 INJECTION, SOLUTION INTRAVENOUS at 10:15

## 2022-04-29 RX ADMIN — ROCURONIUM BROMIDE 10 MG: 50 INJECTION, SOLUTION INTRAVENOUS at 10:56

## 2022-04-29 RX ADMIN — FENTANYL CITRATE 50 MCG: 50 INJECTION, SOLUTION INTRAMUSCULAR; INTRAVENOUS at 10:56

## 2022-04-29 RX ADMIN — SODIUM CHLORIDE, SODIUM LACTATE, POTASSIUM CHLORIDE, CALCIUM CHLORIDE: 600; 310; 30; 20 INJECTION, SOLUTION INTRAVENOUS at 08:15

## 2022-04-29 RX ADMIN — SODIUM CHLORIDE, POTASSIUM CHLORIDE, SODIUM LACTATE AND CALCIUM CHLORIDE: 600; 310; 30; 20 INJECTION, SOLUTION INTRAVENOUS at 07:56

## 2022-04-29 RX ADMIN — ROCURONIUM BROMIDE 50 MG: 50 INJECTION, SOLUTION INTRAVENOUS at 08:01

## 2022-04-29 RX ADMIN — ONDANSETRON 4 MG: 2 INJECTION INTRAMUSCULAR; INTRAVENOUS at 11:36

## 2022-04-29 NOTE — ANESTHESIA CARE TRANSFER NOTE
Patient: Duglas Gao    Procedure: Procedure(s):  ROBOT-ASSISTED LAPAROSCOPIC RETROPERITONEAL LYMPH NODE DISSECTION       Diagnosis: Prostate cancer (H) [C61]  Diagnosis Additional Information: No value filed.    Anesthesia Type:   General     Note:    Oropharynx: oropharynx clear of all foreign objects  Level of Consciousness: awake  Oxygen Supplementation: face mask  Level of Supplemental Oxygen (L/min / FiO2): 6  Independent Airway: airway patency satisfactory and stable  Dentition: dentition unchanged  Vital Signs Stable: post-procedure vital signs reviewed and stable  Report to RN Given: handoff report given  Patient transferred to: PACU    Handoff Report: Identifed the Patient, Identified the Reponsible Provider, Reviewed the pertinent medical history, Discussed the surgical course, Reviewed Intra-OP anesthesia mangement and issues during anesthesia, Set expectations for post-procedure period and Allowed opportunity for questions and acknowledgement of understanding      Vitals:  Vitals Value Taken Time   /68 04/29/22 1203   Temp 36.7    Pulse 72 04/29/22 1212   Resp 19 04/29/22 1212   SpO2 99 % 04/29/22 1212   Vitals shown include unvalidated device data.    Electronically Signed By: YESI Cho CRNA  April 29, 2022  12:13 PM

## 2022-04-29 NOTE — DISCHARGE INSTRUCTIONS

## 2022-04-29 NOTE — ANESTHESIA PREPROCEDURE EVALUATION
Anesthesia Pre-Procedure Evaluation    Patient: Duglas Gao   MRN: [id number] : 1965        Procedure : Procedure(s):  ROBOT-ASSISTED LAPAROSCOPIC RETROPERITONEAL LYMPH NODE DISSECTION          Past Medical History:   Diagnosis Date     Allergic rhinitis, cause unspecified      Prostate cancer (H)       Past Surgical History:   Procedure Laterality Date     DAVINCI PROSTATECTOMY       LITHOTRIPSY        Allergies   Allergen Reactions     No Known Drug Allergies       Social History     Tobacco Use     Smoking status: Former Smoker     Smokeless tobacco: Never Used   Substance Use Topics     Alcohol use: Yes     Comment: 2-3 drinks/ day      Wt Readings from Last 1 Encounters:   22 103.6 kg (228 lb 6.3 oz)        Anesthesia Evaluation            ROS/MED HX  ENT/Pulmonary:     (+) allergic rhinitis,     Neurologic:       Cardiovascular:     (+) Dyslipidemia -----    METS/Exercise Tolerance:     Hematologic:       Musculoskeletal:       GI/Hepatic:       Renal/Genitourinary:       Endo:       Psychiatric/Substance Use:     (+) psychiatric history anxiety     Infectious Disease:       Malignancy:   (+) Malignancy, History of Prostate.    Other:            Physical Exam    Airway  airway exam normal      Mallampati: II   TM distance: > 3 FB   Neck ROM: full   Mouth opening: > 3 cm    Respiratory Devices and Support         Dental  no notable dental history         Cardiovascular          Rhythm and rate: regular and normal     Pulmonary   pulmonary exam normal        breath sounds clear to auscultation           OUTSIDE LABS:  CBC:   Lab Results   Component Value Date    WBC 5.4 2022    WBC 6.5 2010    HGB 15.8 2022    HGB 12.9 (L) 2010    HCT 46.4 2022    HCT 37.1 (L) 2010     2022     2010     BMP:   Lab Results   Component Value Date     2010     2010    POTASSIUM 3.8 2010    POTASSIUM 4.0 2010     CHLORIDE 106 12/19/2010    CHLORIDE 105 12/07/2010    CO2 27 12/19/2010    CO2 30 12/07/2010    BUN 13 12/19/2010    BUN 11 12/07/2010    CR 1.04 12/19/2010    CR 1.05 12/07/2010    GLC 98 04/29/2022    GLC 95 07/23/2014     COAGS: No results found for: PTT, INR, FIBR  POC: No results found for: BGM, HCG, HCGS  HEPATIC:   Lab Results   Component Value Date    ALBUMIN 4.4 07/29/2010    PROTTOTAL 7.8 07/29/2010    ALT 38 07/29/2010    AST 43 07/29/2010    ALKPHOS 67 07/29/2010    BILITOTAL 1.1 07/29/2010     OTHER:   Lab Results   Component Value Date    BRISSA 9.2 12/19/2010    PHOS 2.5 12/07/2010    MAG 2.1 12/07/2010    TSH 1.70 07/29/2010       Anesthesia Plan    ASA Status:  3   NPO Status:  NPO Appropriate    Anesthesia Type: General.     - Airway: ETT   Induction: Intravenous.   Maintenance: Balanced.   Techniques and Equipment:     - Lines/Monitors: 2nd IV     Consents    Anesthesia Plan(s) and associated risks, benefits, and realistic alternatives discussed. Questions answered and patient/representative(s) expressed understanding.    - Discussed:     - Discussed with:  Patient      - Extended Intubation/Ventilatory Support Discussed: No.      - Patient is DNR/DNI Status: No    Use of blood products discussed: Yes.     - Discussed with: Patient.     - Consented: consented to blood products            Reason for refusal: other.     Postoperative Care    Pain management: IV analgesics, Oral pain medications, Multi-modal analgesia.   PONV prophylaxis: Ondansetron (or other 5HT-3), Dexamethasone or Solumedrol     Comments:                Oc Smith MD

## 2022-04-29 NOTE — TELEPHONE ENCOUNTER
Reason for visit: post op      Dx/Hx/Sx: prostate cancer     Records/imaging/labs/orders: pathology in process     At Rooming: standard

## 2022-04-29 NOTE — OP NOTE
Procedure Date: 04/29/2022    PREOPERATIVE DIAGNOSIS:  Prostate cancer.    POSTOPERATIVE DIAGNOSIS:  Prostate cancer.    PROCEDURE:  Robotic-assisted retroperitoneal lymphadenectomy.    INDICATIONS FOR PROCEDURE:  Mr. Gao is a 56-year-old gentleman with a history of metastatic prostate cancer.  The patient was found to have an isolated metastatic lesion in the retroperitoneum.  He presents today for robotic-assisted laparoscopic lymphadenectomy.    DESCRIPTION OF PROCEDURE:  After informed consent was obtained, the patient was brought to the operating room, was administered general endotracheal tube anesthesia.  A suitable level of anesthesia was obtained.  He was placed in the modified lateral position with all pressure points padded.  He was secured to the table on a pink pad and with securing straps and tape.  Next, the patient was administered preoperative antibiotics.  He was prepped and draped in standard sterile fashion.  We did place a 16-Citizen of [place] catheter in the bladder prior to the start of the procedure.  Next, the patient's abdomen was entered with a Veress needle in a supraumbilical location.  After confirmatory drop test, the patient's abdomen was insufflated to 15 cm of water pressure worth of CO2.  We then placed our first robotic port in a location that was superior and lateral to the umbilicus.  Subsequent inspection with the camera demonstrated no injury from placement of the Veress needle or the port.  We placed our remaining ports under direct vision in standard fashion.  We then rotated the patient lateral and docked the robot.  We incised the white line of Toldt and dissected the colon off of the kidney.  When this was adequately medialized, we were able to identify the ureter in its course underneath the lower pole of the kidney.  We traced this down distally until where the ureter was found to be going over the common iliac artery.  We then, with a little bit of dissection in this  area, identified the mass.  This was dissected sequentially off of the iliac artery and from surrounding structures.  We did incise the peritoneum where it was overlying this mass.  Once the mass was completely excised, this was sent for frozen section.  We then closed the defect in the peritoneum with a running 2-0 Stratafix stitch.  We then established excellent hemostasis.  The frozen section did come back positive for adenocarcinoma, confirming that we had removed the node that we had come for.  The robot was then undocked.  The patient's abdomen was desufflated and ports were removed.  We closed all the skin with a 4-0 Monocryl in a subcuticular fashion.  A 0.25% Marcaine was used for local anesthesia.  All wounds were dressed with skin glue.  The patient was then awakened, extubated, and brought to recovery room in stable condition.    SURGEON:  Filippo Antoine MD    ASSISTANTS:  Dr. Micho[place]ano Mariee, Dr. Rg Viera.    ESTIMATED BLOOD LOSS:  25 mL    SPECIMENS:  Includes retroperitoneal lymph node.    COMPLICATIONS:  There were no immediate complications noted.    Filippo Antoine MD        D: 2022   T: 2022   MT: CINDY    Name:     SEKOU RODRIGUEZ  MRN:      [id number]        Account:        [id number]   :      1965           Procedure Date: 2022     Document: [id number]

## 2022-04-29 NOTE — INTERVAL H&P NOTE
"I have reviewed the surgical (or preoperative) H&P that is linked to this encounter, and examined the patient. There are no significant changes    Clinical Conditions Present on Arrival:  Clinically Significant Risk Factors Present on Admission                   # Obesity: Estimated body mass index is 32.3 kg/m  as calculated from the following:    Height as of this encounter: 1.791 m (5' 10.51\").    Weight as of this encounter: 103.6 kg (228 lb 6.3 oz).       "

## 2022-04-29 NOTE — ANESTHESIA POSTPROCEDURE EVALUATION
Patient: Duglas Gao    Procedure: Procedure(s):  ROBOT-ASSISTED LAPAROSCOPIC RETROPERITONEAL LYMPH NODE DISSECTION       Anesthesia Type:  General    Note:  Disposition: Outpatient   Postop Pain Control: Uneventful            Sign Out: Well controlled pain   PONV: No   Neuro/Psych: Uneventful            Sign Out: Acceptable/Baseline neuro status   Airway/Respiratory: Uneventful            Sign Out: Acceptable/Baseline resp. status   CV/Hemodynamics: Uneventful            Sign Out: Acceptable CV status   Other NRE: NONE   DID A NON-ROUTINE EVENT OCCUR? No           Last vitals:  Vitals Value Taken Time   /76 04/29/22 1315   Temp 36.6  C (97.9  F) 04/29/22 1300   Pulse 69 04/29/22 1317   Resp 15 04/29/22 1318   SpO2 98 % 04/29/22 1318   Vitals shown include unvalidated device data.    Electronically Signed By: Oc Smith MD  April 29, 2022  1:31 PM

## 2022-04-29 NOTE — BRIEF OP NOTE
[facility]    Brief Operative Note    Pre-operative diagnosis: Prostate cancer (H) [C61]  Post-operative diagnosis Same as pre-operative diagnosis    Procedure: Procedure(s):  ROBOT-ASSISTED LAPAROSCOPIC RETROPERITONEAL LYMPH NODE DISSECTION  Surgeon: Surgeon(s) and Role:     * Filippo Antoine MD - Primary     * Michoacano Mariee MD - Resident - Assisting     * Rg Viera MD - Resident - Assisting  Anesthesia: General   Estimated Blood Loss: 25cc    Drains: 16-Fr covarrubias  Specimens:   ID Type Source Tests Collected by Time Destination   1 : Right retropertoneal lymph node  Tissue Lymph Node(s) SURGICAL PATHOLOGY EXAM Filippo Antoine MD 4/29/2022 10:56 AM    2 : Right retropertoneal lymph node  Tissue Lymph Node(s) SURGICAL PATHOLOGY EXAM Filippo Antoine MD 4/29/2022 11:04 AM      Findings:   1.5cm node over the right common iliac artery.  Complications: None.  Implants: * No implants in log *    Michoacano Mariee MD  Urology Resident

## 2022-04-29 NOTE — ANESTHESIA PROCEDURE NOTES
Airway       Patient location during procedure: OR       Procedure Start/Stop Times: 4/29/2022 8:08 AM  Staff -        CRNA: Jaylen Barboza APRN CRNA       Performed By: CRNAIndications and Patient Condition       Indications for airway management: abimbola-procedural       Induction type:intravenous       Mask difficulty assessment: 2 - vent by mask + OA or adjuvant +/- NMBA    Final Airway Details       Final airway type: endotracheal airway       Successful airway: ETT - single  Endotracheal Airway Details        ETT size (mm): 7.5       Successful intubation technique: direct laryngoscopy       DL Blade Type: MAC 3       Grade View of Cords: 2       Adjucts: stylet       Position: Right       Measured from: lips       Secured at (cm): 24       Bite block used: None    Post intubation assessment        Placement verified by: capnometry, equal breath sounds and chest rise        Number of attempts at approach: 1       Number of other approaches attempted: 0       Secured with: silk tape       Ease of procedure: easy       Dentition: Intact and Unchanged    Medication(s) Administered   Medication Administration Time: 4/29/2022 8:08 AM

## 2022-05-02 LAB
PATH REPORT.COMMENTS IMP SPEC: NORMAL
PATH REPORT.COMMENTS IMP SPEC: NORMAL
PATH REPORT.FINAL DX SPEC: NORMAL
PATH REPORT.GROSS SPEC: NORMAL
PATH REPORT.INTRAOP OBS SPEC DOC: NORMAL
PATH REPORT.MICROSCOPIC SPEC OTHER STN: NORMAL
PATH REPORT.RELEVANT HX SPEC: NORMAL
PHOTO IMAGE: NORMAL

## 2022-05-09 ENCOUNTER — OFFICE VISIT (OUTPATIENT)
Dept: UROLOGY | Facility: CLINIC | Age: 57
End: 2022-05-09

## 2022-05-09 VITALS
HEART RATE: 66 BPM | HEIGHT: 71 IN | BODY MASS INDEX: 31.78 KG/M2 | WEIGHT: 227 LBS | DIASTOLIC BLOOD PRESSURE: 91 MMHG | SYSTOLIC BLOOD PRESSURE: 147 MMHG

## 2022-05-09 DIAGNOSIS — C61 PROSTATE CANCER (H): ICD-10-CM

## 2022-05-09 DIAGNOSIS — T14.8XXA OPEN WOUND: Primary | ICD-10-CM

## 2022-05-09 PROCEDURE — 99024 POSTOP FOLLOW-UP VISIT: CPT | Performed by: UROLOGY

## 2022-05-09 RX ORDER — CEPHALEXIN 500 MG/1
500 CAPSULE ORAL 2 TIMES DAILY
Qty: 10 CAPSULE | Refills: 0 | Status: SHIPPED | OUTPATIENT
Start: 2022-05-09 | End: 2022-09-12

## 2022-05-09 ASSESSMENT — PAIN SCALES - GENERAL: PAINLEVEL: MILD PAIN (2)

## 2022-05-09 NOTE — LETTER
5/9/2022       RE: Duglas Gao  [address] [facility] [address]     Dear Colleague,    Thank you for referring your patient, Duglas Gao, to the [facility]OLOG[facility] [place] at [facility]. Please see a copy of my visit note below.    CC: prostate cancer    HPI: 57 yo male with metastatic prostate cancer now s/p robot-assisted lap metastasectomy.  Went home the same day.  Doing well.    OBJECTIVE: assistant port site still a little open, with clear discharge.    PAthology from 4/29/22 shows metastatic prostate cancer    PSA 4/29/22:1.59 ng/mL     ASSESSMENT AND PLAN:  Over half of today's 25-minute visit was spent reviewing the chart, results and counseling the patient regarding his prostate cancer.  We are pleased the patient is doing well.  We will repeat his PSA in 8 weeks.  I gave him a script for keflex 500 mg po bid for 5 days for the port site opening.  He should also follow up with Dr. Morocho pending the PSA results.    Sincerely,    Filippo Antoine MD

## 2022-05-09 NOTE — NURSING NOTE
"Chief Complaint   Patient presents with     Surgical Followup     ROBOT-ASSISTED LAPAROSCOPIC RETROPERITONEAL LYMPH NODE DISSECTION on 4/29       Blood pressure (!) 147/91, pulse 66, height 1.791 m (5' 10.5\"), weight 103 kg (227 lb). Body mass index is 32.11 kg/m .    Patient Active Problem List   Diagnosis     Allergic rhinitis     FAMILY HX GI MALIGNANCY     CTS (carpal tunnel syndrome)     HYPERLIPIDEMIA LDL GOAL <160     Prostate cancer (H)     Dysthymia     Anxiety     Erectile dysfunction     Elevated triglycerides with high cholesterol     Urethral stricture       Allergies   Allergen Reactions     No Known Drug Allergies        Current Outpatient Medications   Medication Sig Dispense Refill     BuPROPion HCl (WELLBUTRIN PO) Take 300 mg by mouth every morning       diazepam (VALIUM) 10 MG tablet Take one tablet 30 min prior to the procedure. Do not drive after taking this medication. 1 tablet 0     fluticasone (FLONASE) 50 MCG/ACT nasal spray USE 2 SPRAYS INTO BOTH NOSTRILS DAILY 48 g 0     Ibuprofen (ADVIL PO) Take by mouth as needed       oxyCODONE (ROXICODONE) 5 MG tablet Take 1 tablet (5 mg) by mouth every 6 hours as needed for pain 9 tablet 0     rosuvastatin (CRESTOR) 10 MG tablet Take 10 mg by mouth every morning       SENNA-docusate sodium (SENNA S) 8.6-50 MG tablet Take 1 tablet by mouth 2 times daily as needed (constipation) 10 tablet 0       Social History     Tobacco Use     Smoking status: Former Smoker     Smokeless tobacco: Never Used   Substance Use Topics     Alcohol use: Yes     Comment: 2-3 drinks/ day     Drug use: No       Yolanda Frank, EMT  5/9/2022  10:02 AM  "

## 2022-05-09 NOTE — PATIENT INSTRUCTIONS
Please schedule a video visit on 7/11 at 11:30 with a  PSA prior    It was a pleasure meeting with you today.  Thank you for allowing me and my team the privilege of caring for you today.  YOU are the reason we are here, and I truly hope we provided you with the excellent service you deserve.  Please let us know if there is anything else we can do for you so that we can be sure you are leaving completely satisfied with your care experience.

## 2022-05-09 NOTE — PROGRESS NOTES
CC: prostate cancer    HPI: 57 yo male with metastatic prostate cancer now s/p robot-assisted lap metastasectomy.  Went home the same day.  Doing well.    OBJECTIVE: assistant port site still a little open, with clear discharge.    PAthology from 4/29/22 shows metastatic prostate cancer    PSA 4/29/22:1.59 ng/mL     ASSESSMENT AND PLAN:  Over half of today's 25-minute visit was spent reviewing the chart, results and counseling the patient regarding his prostate cancer.  We are pleased the patient is doing well.  We will repeat his PSA in 8 weeks.  I gave him a script for keflex 500 mg po bid for 5 days for the port site opening.  He should also follow up with Dr. Morocho pending the PSA results.

## 2022-06-02 ENCOUNTER — PRE VISIT (OUTPATIENT)
Dept: UROLOGY | Facility: CLINIC | Age: 57
End: 2022-06-02
Payer: COMMERCIAL

## 2022-06-02 NOTE — TELEPHONE ENCOUNTER
Reason for visit: 2 month follow up      Dx/Hx/Sx: prostate cancer s/p robot-assisted lap metastasectomy    Records/imaging/labs/orders: PSA in epic    At Rooming: video visit

## 2022-06-08 ENCOUNTER — MYC MEDICAL ADVICE (OUTPATIENT)
Dept: UROLOGY | Facility: CLINIC | Age: 57
End: 2022-06-08
Payer: COMMERCIAL

## 2022-07-05 ENCOUNTER — LAB (OUTPATIENT)
Dept: LAB | Facility: CLINIC | Age: 57
End: 2022-07-05
Payer: COMMERCIAL

## 2022-07-05 DIAGNOSIS — C61 PROSTATE CANCER (H): ICD-10-CM

## 2022-07-05 LAB — PSA SERPL-MCNC: 0.3 UG/L (ref 0–4)

## 2022-07-05 PROCEDURE — 36415 COLL VENOUS BLD VENIPUNCTURE: CPT | Performed by: PATHOLOGY

## 2022-07-05 PROCEDURE — 84153 ASSAY OF PSA TOTAL: CPT | Performed by: PATHOLOGY

## 2022-07-11 ENCOUNTER — TELEPHONE (OUTPATIENT)
Dept: UROLOGY | Facility: CLINIC | Age: 57
End: 2022-07-11

## 2022-07-11 ENCOUNTER — VIRTUAL VISIT (OUTPATIENT)
Dept: UROLOGY | Facility: CLINIC | Age: 57
End: 2022-07-11
Payer: COMMERCIAL

## 2022-07-11 DIAGNOSIS — C61 PROSTATE CANCER (H): Primary | ICD-10-CM

## 2022-07-11 PROCEDURE — 99214 OFFICE O/P EST MOD 30 MIN: CPT | Mod: GT | Performed by: UROLOGY

## 2022-07-11 NOTE — PROGRESS NOTES
Carlos is a 56 year old who is being evaluated via a billable video visit.      How would you like to obtain your AVS? MyChart  If the video visit is dropped, the invitation should be resent by: Text to cell phone: [phone]  Will anyone else be joining your video visit? No        Video-Visit Details    Video Start Time: 11:40am    Type of service:  Video Visit  CC: prostate cancer     HPI: 57 yo male with metastatic prostate cancer now s/p robot-assisted lap metastasectomy on 4/29/22 which confirmed the LN as metastatic prostate cancer.  Went home the same day.  Doing well.  Port site eventually closed.  Had recent PSA drawn.     OBJECTIVE:   PSA from 7/5/22 is 0.3 ng/mL  PSA 4/29/22:1.59 ng/mL           ASSESSMENT AND PLAN:  Over half of today's 33-minute visit was spent reviewing the chart, results and counseling the patient regarding his prostate cancer.  We are pleased the PSA has come down some though it did not go undetectable.  We will simply repeat the PSA in 3 months and see if it changes.  The patient will follow up with Dr. Morocho to go over the PSA results.    Video End Time:12:02pm    Originating Location (pt. Location): Home    Distant Location (provider location):  [facility] UROLOGY CLIN[facility] [place]     Platform used for Video Visit: PolyPid

## 2022-07-11 NOTE — LETTER
7/11/2022       RE: Duglas Gao  [address] [facility] [place] [address]     Dear Colleague,    Thank you for referring your patient, Duglas Gao, to the [facility] UROLOGY [facility] [place] at [facility]. Please see a copy of my visit note below.    Carlos is a 56 year old who is being evaluated via a billable video visit.      How would you like to obtain your AVS? MyChart  If the video visit is dropped, the invitation should be resent by: Text to cell phone: [phone]  Will anyone else be joining your video visit? No    Video-Visit Details    Video Start Time: 11:40am    Type of service:  Video Visit  CC: prostate cancer     HPI: 57 yo male with metastatic prostate cancer now s/p robot-assisted lap metastasectomy on 4/29/22 which confirmed the LN as metastatic prostate cancer.  Went home the same day.  Doing well.  Port site eventually closed.  Had recent PSA drawn.     OBJECTIVE:   PSA from 7/5/22 is 0.3 ng/mL  PSA 4/29/22:1.59 ng/mL      ASSESSMENT AND PLAN:  Over half of today's 33-minute visit was spent reviewing the chart, results and counseling the patient regarding his prostate cancer.  We are pleased the PSA has come down some though it did not go undetectable.  We will simply repeat the PSA in 3 months and see if it changes.  The patient will follow up with Dr. Morocho to go over the PSA results.    Video End Time:12:02pm    Originating Location (pt. Location): Home    Distant Location (provider location):  [facility] [place]     Platform used for Video Visit: Kerwin    Sincerely,    Filippo Antoine MD

## 2022-07-19 DIAGNOSIS — K76.9 LIVER LESION: Primary | ICD-10-CM

## 2022-07-19 NOTE — PROGRESS NOTES
Spoke with patient regarding his indeterminant liver lesions.  We will repeat the liver MRI at this point and the patient will follow up with Dr. Morocho in August with a PSA as well.  The patient is in agreement with the plan.

## 2022-07-29 DIAGNOSIS — C61 MALIGNANT NEOPLASM OF PROSTATE (H): Primary | ICD-10-CM

## 2022-08-03 ENCOUNTER — PATIENT OUTREACH (OUTPATIENT)
Dept: ONCOLOGY | Facility: CLINIC | Age: 57
End: 2022-08-03

## 2022-08-03 DIAGNOSIS — F40.240 CLAUSTROPHOBIA: Primary | ICD-10-CM

## 2022-08-03 RX ORDER — DIAZEPAM 10 MG
TABLET ORAL
Qty: 1 TABLET | Refills: 0 | Status: SHIPPED | OUTPATIENT
Start: 2022-08-03

## 2022-09-09 ENCOUNTER — ANCILLARY PROCEDURE (OUTPATIENT)
Dept: MRI IMAGING | Facility: CLINIC | Age: 57
End: 2022-09-09
Attending: UROLOGY
Payer: COMMERCIAL

## 2022-09-09 ENCOUNTER — LAB (OUTPATIENT)
Dept: LAB | Facility: CLINIC | Age: 57
End: 2022-09-09

## 2022-09-09 DIAGNOSIS — C61 MALIGNANT NEOPLASM OF PROSTATE (H): ICD-10-CM

## 2022-09-09 DIAGNOSIS — K76.9 LIVER LESION: ICD-10-CM

## 2022-09-09 PROCEDURE — 84403 ASSAY OF TOTAL TESTOSTERONE: CPT | Mod: 90 | Performed by: PATHOLOGY

## 2022-09-09 PROCEDURE — A9581 GADOXETATE DISODIUM INJ: HCPCS | Performed by: RADIOLOGY

## 2022-09-09 PROCEDURE — 99000 SPECIMEN HANDLING OFFICE-LAB: CPT | Performed by: PATHOLOGY

## 2022-09-09 PROCEDURE — 84153 ASSAY OF PSA TOTAL: CPT | Mod: 90 | Performed by: PATHOLOGY

## 2022-09-09 PROCEDURE — 74183 MRI ABD W/O CNTR FLWD CNTR: CPT | Performed by: RADIOLOGY

## 2022-09-09 PROCEDURE — 36415 COLL VENOUS BLD VENIPUNCTURE: CPT | Performed by: PATHOLOGY

## 2022-09-09 NOTE — DISCHARGE INSTRUCTIONS
MRI Contrast Discharge Instructions    The IV contrast you received today will pass out of your body in your  urine. This will happen in the next 24 hours. You will not feel this process.  Your urine will not change color.    Drink at least 4 extra glasses of water or juice today (unless your doctor  has restricted your fluids). This reduces the stress on your kidneys.  You may take your regular medicines.    If you are on dialysis: It is best to have dialysis today.    If you have a reaction: Most reactions happen right away. If you have  any new symptoms after leaving the hospital (such as hives or swelling),  call your hospital at the correct number below. Or call your family doctor.  If you have breathing distress or wheezing, call 911.    Special instructions: ***    I have read and understand the above information.    Signature:______________________________________ Date:___________    Staff:__________________________________________ Date:___________     Time:__________    [facility] Departments:    ___[place]: [phone]  ___[facility]: [phone]  ___[place]: [phone] ___[facility]: [phone]  ___[facility]: [phone]  ___[place] [facility]: [phone]  ___[phone]  ___[facility]: [phone]  ___[phone]

## 2022-09-10 LAB — PSA SERPL-MCNC: 0.32 NG/ML (ref 0–3.5)

## 2022-09-12 ENCOUNTER — ONCOLOGY VISIT (OUTPATIENT)
Dept: ONCOLOGY | Facility: CLINIC | Age: 57
End: 2022-09-12
Attending: INTERNAL MEDICINE
Payer: COMMERCIAL

## 2022-09-12 VITALS
WEIGHT: 230 LBS | SYSTOLIC BLOOD PRESSURE: 139 MMHG | TEMPERATURE: 97.3 F | DIASTOLIC BLOOD PRESSURE: 83 MMHG | HEART RATE: 67 BPM | OXYGEN SATURATION: 98 % | BODY MASS INDEX: 32.54 KG/M2

## 2022-09-12 DIAGNOSIS — C61 MALIGNANT NEOPLASM OF PROSTATE (H): Primary | ICD-10-CM

## 2022-09-12 PROCEDURE — G0463 HOSPITAL OUTPT CLINIC VISIT: HCPCS

## 2022-09-12 PROCEDURE — 99215 OFFICE O/P EST HI 40 MIN: CPT | Performed by: INTERNAL MEDICINE

## 2022-09-12 RX ORDER — BUPROPION HYDROCHLORIDE 150 MG/1
TABLET, EXTENDED RELEASE ORAL
COMMUNITY
Start: 2022-08-09

## 2022-09-12 ASSESSMENT — PAIN SCALES - GENERAL: PAINLEVEL: NO PAIN (0)

## 2022-09-12 NOTE — PROGRESS NOTES
FOLLOW UP VISIT    Reason for Visit:  Biochemically-recurrent prostate cancer with solitary retroperitoneal amy metastasis s/p metastasectomy.    History of Present Illness:  Mr. Gao is a 57-year old man who was diagnosed with prostate cancer in 10/2010, at age 45.  His PSA level at that time was 3.7 ng/mL.  He underwent a radical prostatectomy on 12/6/2010, which revealed prostatic acinar adenocarcinoma, [place] 4+4=8, with organ-confined disease, no extraprostatic extension or seminal vesicle invasion, 0/16 positive lymph nodes, but he did have a positive apical surgical margin.  His final pathological stage was pT2b N0 R1.    Following surgery, he developed a biochemical recurrence with a PSA level reaching 0.34 ng/mL by 8/2013.  He was then treated with salvage external-beam radiotherapy using 7020 cGy over 39 fractions, ending in 12/2013.  He also received concurrent androgen deprivation therapy for 12 months, ending in 9/2014.  His PSA level then remained undetectable for approximately 6 years.  Unfortunately, in 3/2020, he developed a further biochemical recurrence.  His PSA on 3/3/2020 was 0.2, the PSA on 11/20/2020 was 0.3, the PSA on 4/23/2021 was 0.6, and the PSA on 2/17/2022 was 1.59 ng/mL.  His PSA doubling time was calculated as 7.9 months.    The patient then underwent a PSMA-PET scan on 2/17/2022.  This showed a PSMA-avid right iliac lymph node metastasis, without other evidence of local recurrence or distant spread.  On 4/29/2022, he underwent surgical metastasectomy with Dr Antoine.  As a result, his PSA level has dropped down to 0.32 ng/mL (9/9/2022).  He returns today for a follow-up visit, and to discuss management options moving forward.  He wishes to avoid hormonal therapy if possible.    Review of Systems:  The patient reports feeling generally well.  He does have erectile dysfunction, which has been a problem for him since his radical prostatectomy.  He does not report any additional  new signs or symptoms at this time.  He has not had any changes in his weight.  He does not have any cancer-related pain.  A comprehensive 14-system review of symptoms is otherwise generally within normal limits.  His ECOG score is 0.  His pain score is 0/10.    Medications:  Rosuvastatin 10 mg  Bupropion 300 mg  Fluticasone nasal spray  Ibuprofen PRN    Physical Examination:  Mr. Gao is a 57-year-old man who appears comfortable at rest.  His vital signs are unremarkable.  His HEENT examination is within normal limits.  There is no palpable lymphadenopathy.  The cardiac, pulmonary, and gastrointestinal examinations are within normal limits.  The neuromuscular examination is intact.  The extremities do not demonstrate pitting edema.  The skin evaluation is unremarkable.    Surgical Pathology (4/29/22):  He underwent surgical metastasectomy of a right retroperitoneal lymph node on 4/29/22.  This showed metastatic prostatic adenocarcinoma (2.5 cm in greatest diameter) with extranodal extension.  This will be sent for [email or web address] somatic DNA analysis.    Laboratories (9/9/22):  His PSA level (on 9/9/22) was 0.32 ng/mL, which has dropped from 1.59 ng/mL after metastasectomy.      ASSESSMENT AND PLAN:  Mr. Gao is a 57-year-old man with a history of high-risk localized prostate cancer (pT2b N0 R1, [place] 4+4=8, PSA 3.7 ng/mL) who underwent radical prostatectomy followed by salvage radiotherapy.  He subsequently developed a biochemical recurrence with a PSA level of 1.59 ng/mL and a PSMA-PET scan showing a solitary right iliac amy metastasis which was surgically resected on 4/29/2022.  His ECOG score is 0.  His pain score is 0/10.    The patient has developed a single metastatic recurrence involving a right iliac lymph node, without evidence of additional local or distant metastatic deposits.  On 4/29/2022, he underwent successful surgical metastasectomy which confirmed metastatic prostatic adenocarcinoma.  Following  resection, his PSA level dropped from 1.59 ng/mL down to 0.32 ng/mL.  Moving forward, he will undergo PSA testing once every 2-3 months.  If his PSA level rises above 1 ng/mL in the future, then he might be a candidate for another PSMA-PET scan at that time.  Alternatively, he could begin a cycle of intermittent ADT, although the patient has been generally reluctant to receive hormonal therapy.  I will conduct a Telemedicine visit with him in January 2023, and he will undergo two more PSA tests before then.    We also spent some time reviewing his germline genetic testing results ([facility]).  Fortunately, he did not harbor any inherited DNA-repair gene mutations.  We also previously attempted to perform somatic tumor DNA analysis ([facility]) from his 2010 radical prostatectomy specimen, although this was not successful.  Therefore, we will obtain a new somatic DNA analysis from his recent lymph node resection specimen (4/29/2022).  This may guide future systemic therapy options moving forward.    A total of 40 minutes were spent in consultation with the patient on the day of the encounter, of which more than 50% of this time was used for counseling and coordination of care.  The patient was encouraged to ask multiple questions today, all of which were answered to his satisfaction.    Al Morocho M.D.

## 2022-09-12 NOTE — NURSING NOTE
"Oncology Rooming Note    September 12, 2022 2:35 PM   Duglas Gao is a 57 year old male who presents for:    Chief Complaint   Patient presents with     Oncology Clinic Visit     Prostate Cancer     Initial Vitals: /83 (BP Location: Right arm, Patient Position: Sitting, Cuff Size: Adult Regular)   Pulse 67   Temp 97.3  F (36.3  C) (Oral)   Wt 104.3 kg (230 lb)   SpO2 98%   BMI 32.54 kg/m   Estimated body mass index is 32.54 kg/m  as calculated from the following:    Height as of 5/9/22: 1.791 m (5' 10.5\").    Weight as of this encounter: 104.3 kg (230 lb). Body surface area is 2.28 meters squared.  No Pain (0) Comment: Data Unavailable   No LMP for male patient.  Allergies reviewed: Yes  Medications reviewed: Yes    Medications: Medication refills not needed today.  Pharmacy name entered into EPIC:    [facility] [place] [place] - [address]  [facility] [place], [place] - [address] [place] [address] [phone]  [id number] [place], [place] - [address] AT [facility] [place], [place] - [address]    Clinical concerns: none.       Rich Gonzalez"

## 2022-09-12 NOTE — LETTER
9/12/2022         RE: Duglas Gao  [address] [facility] [address]        Dear Colleague,    Thank you for referring your patient, Duglas Gao, to the [facility]. Please see a copy of my visit note below.      FOLLOW UP VISIT    Reason for Visit:  Biochemically-recurrent prostate cancer with solitary retroperitoneal amy metastasis s/p metastasectomy.    History of Present Illness:  Mr. Gao is a 57-year old man who was diagnosed with prostate cancer in 10/2010, at age 45.  His PSA level at that time was 3.7 ng/mL.  He underwent a radical prostatectomy on 12/6/2010, which revealed prostatic acinar adenocarcinoma, [place] 4+4=8, with organ-confined disease, no extraprostatic extension or seminal vesicle invasion, 0/16 positive lymph nodes, but he did have a positive apical surgical margin.  His final pathological stage was pT2b N0 R1.    Following surgery, he developed a biochemical recurrence with a PSA level reaching 0.34 ng/mL by 8/2013.  He was then treated with salvage external-beam radiotherapy using 7020 cGy over 39 fractions, ending in 12/2013.  He also received concurrent androgen deprivation therapy for 12 months, ending in 9/2014.  His PSA level then remained undetectable for approximately 6 years.  Unfortunately, in 3/2020, he developed a further biochemical recurrence.  His PSA on 3/3/2020 was 0.2, the PSA on 11/20/2020 was 0.3, the PSA on 4/23/2021 was 0.6, and the PSA on 2/17/2022 was 1.59 ng/mL.  His PSA doubling time was calculated as 7.9 months.    The patient then underwent a PSMA-PET scan on 2/17/2022.  This showed a PSMA-avid right iliac lymph node metastasis, without other evidence of local recurrence or distant spread.  On 4/29/2022, he underwent surgical metastasectomy with Dr Antoine.  As a result, his PSA level has dropped down to 0.32 ng/mL (9/9/2022).  He returns today for a follow-up visit, and to discuss management options moving  forward.  He wishes to avoid hormonal therapy if possible.    Review of Systems:  The patient reports feeling generally well.  He does have erectile dysfunction, which has been a problem for him since his radical prostatectomy.  He does not report any additional new signs or symptoms at this time.  He has not had any changes in his weight.  He does not have any cancer-related pain.  A comprehensive 14-system review of symptoms is otherwise generally within normal limits.  His ECOG score is 0.  His pain score is 0/10.    Medications:  Rosuvastatin 10 mg  Bupropion 300 mg  Fluticasone nasal spray  Ibuprofen PRN    Physical Examination:  Mr. Gao is a 57-year-old man who appears comfortable at rest.  His vital signs are unremarkable.  His HEENT examination is within normal limits.  There is no palpable lymphadenopathy.  The cardiac, pulmonary, and gastrointestinal examinations are within normal limits.  The neuromuscular examination is intact.  The extremities do not demonstrate pitting edema.  The skin evaluation is unremarkable.    Surgical Pathology (4/29/22):  He underwent surgical metastasectomy of a right retroperitoneal lymph node on 4/29/22.  This showed metastatic prostatic adenocarcinoma (2.5 cm in greatest diameter) with extranodal extension.  This will be sent for [facility] somatic DNA analysis.    Laboratories (9/9/22):  His PSA level (on 9/9/22) was 0.32 ng/mL, which has dropped from 1.59 ng/mL after metastasectomy.      ASSESSMENT AND PLAN:  Mr. Gao is a 57-year-old man with a history of high-risk localized prostate cancer (pT2b N0 R1, [place] 4+4=8, PSA 3.7 ng/mL) who underwent radical prostatectomy followed by salvage radiotherapy.  He subsequently developed a biochemical recurrence with a PSA level of 1.59 ng/mL and a PSMA-PET scan showing a solitary right iliac amy metastasis which was surgically resected on 4/29/2022.  His ECOG score is 0.  His pain score is 0/10.    The patient has developed a  single metastatic recurrence involving a right iliac lymph node, without evidence of additional local or distant metastatic deposits.  On 4/29/2022, he underwent successful surgical metastasectomy which confirmed metastatic prostatic adenocarcinoma.  Following resection, his PSA level dropped from 1.59 ng/mL down to 0.32 ng/mL.  Moving forward, he will undergo PSA testing once every 2-3 months.  If his PSA level rises above 1 ng/mL in the future, then he might be a candidate for another PSMA-PET scan at that time.  Alternatively, he could begin a cycle of intermittent ADT, although the patient has been generally reluctant to receive hormonal therapy.  I will conduct a Telemedicine visit with him in January 2023, and he will undergo two more PSA tests before then.    We also spent some time reviewing his germline genetic testing results ([facility]).  Fortunately, he did not harbor any inherited DNA-repair gene mutations.  We also previously attempted to perform somatic tumor DNA analysis ([facility]) from his 2010 radical prostatectomy specimen, although this was not successful.  Therefore, we will obtain a new somatic DNA analysis from his recent lymph node resection specimen (4/29/2022).  This may guide future systemic therapy options moving forward.    A total of 40 minutes were spent in consultation with the patient on the day of the encounter, of which more than 50% of this time was used for counseling and coordination of care.  The patient was encouraged to ask multiple questions today, all of which were answered to his satisfaction.          Again, thank you for allowing me to participate in the care of your patient.      Sincerely,    Al Morocho MD

## 2022-09-13 LAB — TESTOST SERPL-MCNC: 252 NG/DL (ref 240–950)

## 2022-10-09 ENCOUNTER — HEALTH MAINTENANCE LETTER (OUTPATIENT)
Age: 57
End: 2022-10-09

## 2022-10-12 ENCOUNTER — PATIENT OUTREACH (OUTPATIENT)
Dept: ONCOLOGY | Facility: CLINIC | Age: 57
End: 2022-10-12

## 2022-10-12 NOTE — PROGRESS NOTES
[facility]: Cancer Care                                                                                          Email received from Eli Brown manager stating she needed a requisition form for Carlos.     Form printed and given to Dr. Morocho for signature. MD signed and gave form back to [facility].     Fax sent to fax number Eli provided. No further follow up needed.     Kaylene Main RN   RN Care Cordinator   [phone]

## 2022-10-28 ENCOUNTER — DOCUMENTATION ONLY (OUTPATIENT)
Dept: ONCOLOGY | Facility: CLINIC | Age: 57
End: 2022-10-28
Payer: COMMERCIAL

## 2022-10-28 DIAGNOSIS — C61 MALIGNANT NEOPLASM OF PROSTATE (H): Primary | ICD-10-CM

## 2022-10-29 NOTE — PROGRESS NOTES
ESTHER Report (10/28/2022):     TMPRSS2-ETV5 fusion   PTEN loss (?)     TMB 3 muts/Mb   gLOH high (21%)   PD L1 0%

## 2022-10-31 LAB — SPECIMEN STATUS: NORMAL

## 2022-11-10 ENCOUNTER — LAB (OUTPATIENT)
Dept: LAB | Facility: CLINIC | Age: 57
End: 2022-11-10
Payer: COMMERCIAL

## 2022-11-10 DIAGNOSIS — C61 MALIGNANT NEOPLASM OF PROSTATE (H): ICD-10-CM

## 2022-11-10 LAB — PSA SERPL-MCNC: 0.48 NG/ML (ref 0–3.5)

## 2022-11-10 PROCEDURE — 36415 COLL VENOUS BLD VENIPUNCTURE: CPT | Performed by: PATHOLOGY

## 2022-11-10 PROCEDURE — 84153 ASSAY OF PSA TOTAL: CPT | Performed by: PATHOLOGY

## 2023-01-10 ENCOUNTER — LAB (OUTPATIENT)
Dept: LAB | Facility: CLINIC | Age: 58
End: 2023-01-10
Payer: COMMERCIAL

## 2023-01-10 DIAGNOSIS — C61 MALIGNANT NEOPLASM OF PROSTATE (H): ICD-10-CM

## 2023-01-10 LAB — PSA SERPL-MCNC: 0.7 NG/ML (ref 0–3.5)

## 2023-01-10 PROCEDURE — 84153 ASSAY OF PSA TOTAL: CPT | Performed by: PATHOLOGY

## 2023-01-10 PROCEDURE — 36415 COLL VENOUS BLD VENIPUNCTURE: CPT | Performed by: PATHOLOGY

## 2023-01-14 NOTE — PROGRESS NOTES
Carlos Gao is a 57-year-old man who is being evaluated via a billable video visit.      How would you like to obtain your AVS? KSEharCodoon  If the video visit is dropped, the invitation should be resent by: Send to e-mail at: [email or web address]  Will anyone else be joining your video visit? No      Video-Visit Details    Type of service:  Video Visit     Originating Location (pt. Location):  Home    Distant Location (provider location):  [facility]  Platform used for Video Visit:  Kerwin Jamil VF      ----------------------------------------------------------------------------------------------------------------------    FOLLOW UP  -  TELEMEDICINE VISIT    Reason for Visit:  Biochemically-recurrent prostate cancer with solitary retroperitoneal amy metastasis s/p metastasectomy.    History of Present Illness:  Mr. Gao is a 57-year old man who was diagnosed with prostate cancer in 10/2010, at age 45.  His PSA level at that time was 3.7 ng/mL.  He underwent a radical prostatectomy on 12/6/2010, which revealed prostatic acinar adenocarcinoma, [place] 4+4=8, with organ-confined disease, no extraprostatic extension or seminal vesicle invasion, 0/16 positive lymph nodes, but he did have a positive apical surgical margin.  His final pathological stage was pT2b N0 R1.  [facility] NGS analysis revealed a TMPRSS2-ETV5 fusion and a PTEN loss, TMB 3 muts/Mb, and gLOH 21% (high).    Following surgery, he developed a biochemical recurrence with a PSA level reaching 0.34 ng/mL by 8/2013.  He was then treated with salvage external-beam radiotherapy using 7020 cGy over 39 fractions, ending in 12/2013.  He also received concurrent androgen deprivation therapy for 12 months, ending in 9/2014.  His PSA level then remained undetectable for approximately 6 years.  Unfortunately, in 3/2020, he developed a further biochemical recurrence.  His PSA on 3/3/2020 was 0.2, the PSA on 11/20/2020 was  0.3, the PSA on 4/23/2021 was 0.6, and the PSA on 2/17/2022 was 1.59 ng/mL.    The patient then underwent a PSMA-PET scan on 2/17/2022.  This showed a PSMA-avid right iliac lymph node metastasis, without other evidence of local recurrence or distant spread.  On 4/29/2022, he underwent surgical metastasectomy with Dr Antoine.  As a result, his PSA level has dropped down to 0.32 ng/mL (9/9/2022).  The PSA on 11/10/22 was 0.48 ng/mL, and the PSA on 1/10/23 was 0.70 ng/mL.  He returns today for a follow-up visit, and to discuss management options moving forward.  He wishes to avoid hormonal therapy if possible.    Review of Systems:  The patient reports feeling generally well.  He does have erectile dysfunction, which has been a problem for him since his radical prostatectomy.  He does not report any additional new signs or symptoms at this time.  He has not had any changes in his weight.  He does not have any cancer-related pain.  A comprehensive 14-system review of symptoms is otherwise generally within normal limits.  His ECOG score is 0.  His pain score is 0/10.    Medications:  Rosuvastatin 10 mg  Bupropion 300 mg  Fluticasone nasal spray  Ibuprofen PRN    Physical Examination:  Mr. Gao is a 57-year-old man who appears comfortable at rest.  His vital signs are unremarkable.  His HEENT examination is within normal limits.  There is no palpable lymphadenopathy.  The cardiac, pulmonary, and gastrointestinal examinations are within normal limits.  The neuromuscular examination is intact.  The extremities do not demonstrate pitting edema.  The skin evaluation is unremarkable.    PSMA-PET scan (2/17/22):  This showed a PSMA-avid right iliac lymph node metastasis, without other evidence of local recurrence or distant spread.    Surgical Pathology (4/29/22):  He underwent surgical metastasectomy of a right retroperitoneal lymph node on 4/29/22.  This showed metastatic prostatic adenocarcinoma (2.5 cm in greatest  diameter) with extranodal extension.  This was sent for [facility] somatic DNA analysis, and showed a TMPRSS2-ETV5 fusion and a PTEN loss, TMB 3 muts/Mb, and gLOH 21% (high).     Laboratories:  His PSA level (on 9/9/22) was 0.32 ng/mL, which has dropped from 1.59 ng/mL after metastasectomy.  The PSA on 11/10/22 was 0.48 ng/mL, and the PSA on 1/10/23 was 0.70 ng/mL.       ASSESSMENT AND PLAN:  Mr. Gao is a 57-year-old man with a history of high-risk localized prostate cancer (pT2b N0 R1, [place] 4+4=8, PSA 3.7 ng/mL) who underwent radical prostatectomy followed by salvage radiotherapy.  He subsequently developed a biochemical recurrence with a PSA level of 1.59 ng/mL and a PSMA-PET scan showing a solitary right iliac amy metastasis which was surgically resected on 4/29/2022.  The current PSA is back up to 0.7 ng/mL.  His ECOG score is 0.  His pain score is 0/10.    The patient previously developed a single metastatic recurrence involving a right iliac lymph node, without evidence of additional local or distant metastatic deposits.  On 4/29/2022, he underwent successful surgical metastasectomy which confirmed metastatic prostatic adenocarcinoma.  Following resection, his PSA level dropped from 1.59 ng/mL down to 0.32 ng/mL.  The current PSA is back up to 0.7 ng/mL.  Moving forward, he will undergo PSA testing once every 2-3 months.  If his PSA level rises above 1.0 ng/mL in the future, then he might be a candidate for another PSMA-PET scan at that time.  Alternatively, he could begin a cycle (e.g. six months) of intermittent ADT, although the patient has been generally reluctant to receive hormonal therapy.  I will conduct a Telemedicine visit with him in March 2023, and he will undergo two more PSA tests before then.  The patient was encouraged to ask multiple questions today, all of which were answered to his satisfaction.    A total of 40 minutes were spent in consultation with the patient on the day of the  encounter, of which more than 50% of this time was used for counseling and coordination of care.    Al Morocho M.D.

## 2023-01-16 ENCOUNTER — VIRTUAL VISIT (OUTPATIENT)
Dept: ONCOLOGY | Facility: CLINIC | Age: 58
End: 2023-01-16
Attending: INTERNAL MEDICINE
Payer: COMMERCIAL

## 2023-01-16 DIAGNOSIS — C61 MALIGNANT NEOPLASM OF PROSTATE (H): Primary | ICD-10-CM

## 2023-01-16 PROCEDURE — 99215 OFFICE O/P EST HI 40 MIN: CPT | Mod: GT | Performed by: INTERNAL MEDICINE

## 2023-01-16 NOTE — NURSING NOTE
Patient denies any changes since echeck-in regarding medication and allergies and states all information entered during echeck-in remains accurate.    Shane SAUCEDO

## 2023-01-16 NOTE — LETTER
1/16/2023         RE: Duglas Gao  [address] [place] [address]        Dear Colleague,    Thank you for referring your patient, Duglas Gao, to the [facility]. Please see a copy of my visit note below.    Carlos Gao is a 57-year-old man who is being evaluated via a billable video visit.      How would you like to obtain your AVS? MyChart  If the video visit is dropped, the invitation should be resent by: Send to e-mail at: [email or web address]  Will anyone else be joining your video visit? No      Video-Visit Details    Type of service:  Video Visit     Originating Location (pt. Location):  Home    Distant Location (provider location):  [facility]  Platform used for Video Visit:  Kerwin SAUCEDO      ----------------------------------------------------------------------------------------------------------------------    FOLLOW UP  -  TELEMEDICINE VISIT    Reason for Visit:  Biochemically-recurrent prostate cancer with solitary retroperitoneal amy metastasis s/p metastasectomy.    History of Present Illness:  Mr. Gao is a 57-year old man who was diagnosed with prostate cancer in 10/2010, at age 45.  His PSA level at that time was 3.7 ng/mL.  He underwent a radical prostatectomy on 12/6/2010, which revealed prostatic acinar adenocarcinoma, [place] 4+4=8, with organ-confined disease, no extraprostatic extension or seminal vesicle invasion, 0/16 positive lymph nodes, but he did have a positive apical surgical margin.  His final pathological stage was pT2b N0 R1.  [facility] NGS analysis revealed a TMPRSS2-ETV5 fusion and a PTEN loss, TMB 3 muts/Mb, and gLOH 21% (high).    Following surgery, he developed a biochemical recurrence with a PSA level reaching 0.34 ng/mL by 8/2013.  He was then treated with salvage external-beam radiotherapy using 7020 cGy over 39 fractions, ending in 12/2013.  He also received concurrent  androgen deprivation therapy for 12 months, ending in 9/2014.  His PSA level then remained undetectable for approximately 6 years.  Unfortunately, in 3/2020, he developed a further biochemical recurrence.  His PSA on 3/3/2020 was 0.2, the PSA on 11/20/2020 was 0.3, the PSA on 4/23/2021 was 0.6, and the PSA on 2/17/2022 was 1.59 ng/mL.    The patient then underwent a PSMA-PET scan on 2/17/2022.  This showed a PSMA-avid right iliac lymph node metastasis, without other evidence of local recurrence or distant spread.  On 4/29/2022, he underwent surgical metastasectomy with Dr Antoine.  As a result, his PSA level has dropped down to 0.32 ng/mL (9/9/2022).  The PSA on 11/10/22 was 0.48 ng/mL, and the PSA on 1/10/23 was 0.70 ng/mL.  He returns today for a follow-up visit, and to discuss management options moving forward.  He wishes to avoid hormonal therapy if possible.    Review of Systems:  The patient reports feeling generally well.  He does have erectile dysfunction, which has been a problem for him since his radical prostatectomy.  He does not report any additional new signs or symptoms at this time.  He has not had any changes in his weight.  He does not have any cancer-related pain.  A comprehensive 14-system review of symptoms is otherwise generally within normal limits.  His ECOG score is 0.  His pain score is 0/10.    Medications:  Rosuvastatin 10 mg  Bupropion 300 mg  Fluticasone nasal spray  Ibuprofen PRN    Physical Examination:  Mr. Gao is a 57-year-old man who appears comfortable at rest.  His vital signs are unremarkable.  His HEENT examination is within normal limits.  There is no palpable lymphadenopathy.  The cardiac, pulmonary, and gastrointestinal examinations are within normal limits.  The neuromuscular examination is intact.  The extremities do not demonstrate pitting edema.  The skin evaluation is unremarkable.    PSMA-PET scan (2/17/22):  This showed a PSMA-avid right iliac lymph node  metastasis, without other evidence of local recurrence or distant spread.    Surgical Pathology (4/29/22):  He underwent surgical metastasectomy of a right retroperitoneal lymph node on 4/29/22.  This showed metastatic prostatic adenocarcinoma (2.5 cm in greatest diameter) with extranodal extension.  This was sent for [employer] somatic DNA analysis, and showed a TMPRSS2-ETV5 fusion and a PTEN loss, TMB 3 muts/Mb, and gLOH 21% (high).     Laboratories:  His PSA level (on 9/9/22) was 0.32 ng/mL, which has dropped from 1.59 ng/mL after metastasectomy.  The PSA on 11/10/22 was 0.48 ng/mL, and the PSA on 1/10/23 was 0.70 ng/mL.       ASSESSMENT AND PLAN:  Mr. Gao is a 57-year-old man with a history of high-risk localized prostate cancer (pT2b N0 R1, [place] 4+4=8, PSA 3.7 ng/mL) who underwent radical prostatectomy followed by salvage radiotherapy.  He subsequently developed a biochemical recurrence with a PSA level of 1.59 ng/mL and a PSMA-PET scan showing a solitary right iliac amy metastasis which was surgically resected on 4/29/2022.  The current PSA is back up to 0.7 ng/mL.  His ECOG score is 0.  His pain score is 0/10.    The patient previously developed a single metastatic recurrence involving a right iliac lymph node, without evidence of additional local or distant metastatic deposits.  On 4/29/2022, he underwent successful surgical metastasectomy which confirmed metastatic prostatic adenocarcinoma.  Following resection, his PSA level dropped from 1.59 ng/mL down to 0.32 ng/mL.  The current PSA is back up to 0.7 ng/mL.  Moving forward, he will undergo PSA testing once every 2-3 months.  If his PSA level rises above 1.0 ng/mL in the future, then he might be a candidate for another PSMA-PET scan at that time.  Alternatively, he could begin a cycle (e.g. six months) of intermittent ADT, although the patient has been generally reluctant to receive hormonal therapy.  I will conduct a Telemedicine visit with him in  March 2023, and he will undergo two more PSA tests before then.  The patient was encouraged to ask multiple questions today, all of which were answered to his satisfaction.    A total of 40 minutes were spent in consultation with the patient on the day of the encounter, of which more than 50% of this time was used for counseling and coordination of care.    Al Morocho M.D.

## 2023-02-18 ENCOUNTER — HEALTH MAINTENANCE LETTER (OUTPATIENT)
Age: 58
End: 2023-02-18

## 2023-03-13 ENCOUNTER — LAB (OUTPATIENT)
Dept: LAB | Facility: CLINIC | Age: 58
End: 2023-03-13
Payer: COMMERCIAL

## 2023-03-13 DIAGNOSIS — C61 MALIGNANT NEOPLASM OF PROSTATE (H): ICD-10-CM

## 2023-03-13 LAB — PSA SERPL DL<=0.01 NG/ML-MCNC: 0.93 NG/ML (ref 0–3.5)

## 2023-03-13 PROCEDURE — 36415 COLL VENOUS BLD VENIPUNCTURE: CPT | Performed by: PATHOLOGY

## 2023-03-13 PROCEDURE — 84403 ASSAY OF TOTAL TESTOSTERONE: CPT | Mod: 90 | Performed by: PATHOLOGY

## 2023-03-13 PROCEDURE — 99000 SPECIMEN HANDLING OFFICE-LAB: CPT | Performed by: PATHOLOGY

## 2023-03-13 PROCEDURE — 84153 ASSAY OF PSA TOTAL: CPT | Performed by: PATHOLOGY

## 2023-03-14 LAB — TESTOST SERPL-MCNC: 185 NG/DL (ref 240–950)

## 2023-03-14 NOTE — PROGRESS NOTES
Video-Visit Details    Type of service:  Video Visit    Originating Location (pt. Location):  Home        Distant Location (provider location):  [facility]    Mode of Communication:  Video Conference via Consign[employer]      ------------------------------------------------------------------------------------------------------------------------------    FOLLOW UP  -  TELEMEDICINE VISIT    Reason for Visit:  Biochemically-recurrent prostate cancer with solitary retroperitoneal amy metastasis s/p metastasectomy.    History of Present Illness:  Mr. Gao is a 57-year old man who was diagnosed with prostate cancer in 10/2010, at age 45.  His PSA level at that time was 3.7 ng/mL.  He underwent a radical prostatectomy on 12/6/2010, which revealed prostatic acinar adenocarcinoma, [place] 4+4=8, with organ-confined disease, no extraprostatic extension or seminal vesicle invasion, 0/16 positive lymph nodes, but he did have a positive apical surgical margin.  His final pathological stage was pT2b N0 R1.  [facility] NGS analysis revealed a TMPRSS2-ETV5 fusion and a PTEN loss, TMB 3 muts/Mb, and gLOH 21% (high).    Following surgery, he developed a biochemical recurrence with a PSA level reaching 0.34 ng/mL by 8/2013.  He was then treated with salvage external-beam radiotherapy using 7020 cGy over 39 fractions, ending in 12/2013.  He also received concurrent androgen deprivation therapy for 12 months, ending in 9/2014.  His PSA level then remained undetectable for approximately 6 years.  Unfortunately, in 3/2020, he developed a further biochemical recurrence.  His PSA on 3/3/2020 was 0.2, the PSA on 11/20/2020 was 0.3, the PSA on 4/23/2021 was 0.6, and the PSA on 2/17/2022 was 1.59 ng/mL.    The patient then underwent a PSMA-PET scan on 2/17/2022.  This showed a PSMA-avid right iliac lymph node metastasis, without other evidence of local recurrence or distant spread.  On 4/29/2022, he underwent surgical  metastasectomy with Dr Antoine without any additional ADT at that time.  As a result, his PSA level has dropped down to 0.32 ng/mL (9/9/2022).  The PSA on 11/10/22 was 0.48 ng/mL, and the PSA on 1/10/23 was 0.70 ng/mL.  He returns today for a follow-up visit, and to discuss management options moving forward.  He wishes to avoid hormonal therapy if possible.  His current PSA is 0.93 ng/mL.    Review of Systems:  The patient reports feeling generally well.  He does have erectile dysfunction, which has been a problem for him since his radical prostatectomy.  He does not report any additional new signs or symptoms at this time.  He has not had any changes in his weight.  He does not have any cancer-related pain.  A comprehensive 14-system review of symptoms is otherwise generally within normal limits.  His ECOG score is 0.  His pain score is 0/10.    Medications:  Rosuvastatin 10 mg  Bupropion 300 mg  Fluticasone nasal spray  Ibuprofen PRN    Physical Examination:  Mr. Gao is a 57-year-old man who appears comfortable at rest.  His vital signs are unremarkable.  His HEENT examination is within normal limits.  There is no palpable lymphadenopathy.  The cardiac, pulmonary, and gastrointestinal examinations are within normal limits.  The neuromuscular examination is intact.  The extremities do not demonstrate pitting edema.  The skin evaluation is unremarkable.    PSMA-PET scan (2/17/22):  This showed a PSMA-avid right iliac lymph node metastasis, without other evidence of local recurrence or distant spread.    Surgical Pathology (4/29/22):  He underwent surgical metastasectomy of a right retroperitoneal lymph node on 4/29/22.  This showed metastatic prostatic adenocarcinoma (2.5 cm in greatest diameter) with extranodal extension.  This was sent for [employer] somatic DNA analysis, and showed a TMPRSS2-ETV5 fusion and a PTEN loss, TMB 3 muts/Mb, and gLOH 21% (high).     Laboratories (3/13/23):  His PSA level (on 9/9/22) was  0.32 ng/mL, which has dropped from 1.59 ng/mL after metastasectomy.  The PSA on 11/10/22 was 0.48 ng/mL, and the PSA on 1/10/23 was 0.70 ng/mL. Today (3/13/23), the PSA has risen to 0.93 ng/mL.  Testosterone is 185 ng/dL.      ASSESSMENT AND PLAN:  Mr. Gao is a 57-year-old man with a history of high-risk localized prostate cancer (pT2b N0 R1, [place] 4+4=8, PSA 3.7 ng/mL) who underwent radical prostatectomy followed by salvage radiotherapy.  He subsequently developed a biochemical recurrence with a PSA level of 1.59 ng/mL and a PSMA-PET scan showing a solitary right iliac amy metastasis which was surgically resected on 4/29/2022.  The current PSA is back up to 0.93 ng/mL.  His ECOG score is 0.  His pain score is 0/10.    The patient previously developed a single metastatic recurrence involving a right iliac lymph node, without evidence of additional local or distant metastatic deposits.  On 4/29/2022, he underwent successful surgical metastasectomy which confirmed metastatic prostatic adenocarcinoma.  Following resection, his PSA level dropped from 1.59 ng/mL down to 0.32 ng/mL.  However, the current PSA is back up to 0.93 ng/mL.  Moving forward, he will undergo PSA testing once every 2-3 months.  If his PSA level rises above 1.0 ng/mL in the future, then he might be a candidate for another PSMA-PET scan at that time.  Alternatively, he could begin a cycle (e.g. six months) of intermittent ADT, although the patient has been generally reluctant to receive hormonal therapy.  At the end of our consultation, we have decided to simply repeat the PSA test on 5/1/23, and I will conduct a video visit with him on 5/3/23.  I will order a PSMA scan once the PSA level crosses 1.0 ng/mL.  The patient was encouraged to ask multiple questions today, all of which were answered to his satisfaction.    A total of 40 minutes were spent in consultation with the patient on the day of the encounter, of which more than 50% of this  time was used for counseling and coordination of care.    Al Morocho M.D.

## 2023-03-15 ENCOUNTER — VIRTUAL VISIT (OUTPATIENT)
Dept: ONCOLOGY | Facility: CLINIC | Age: 58
End: 2023-03-15
Attending: INTERNAL MEDICINE
Payer: COMMERCIAL

## 2023-03-15 DIAGNOSIS — C61 MALIGNANT NEOPLASM OF PROSTATE (H): Primary | ICD-10-CM

## 2023-03-15 PROCEDURE — 99215 OFFICE O/P EST HI 40 MIN: CPT | Mod: VID | Performed by: INTERNAL MEDICINE

## 2023-03-15 NOTE — LETTER
3/15/2023         RE: Duglas Gao  [address] [place] [address]        Dear Colleague,    Thank you for referring your patient, Duglas Gao, to the [facility]. Please see a copy of my visit note below.    Video-Visit Details    Type of service:  Video Visit    Originating Location (pt. Location):  Home        Distant Location (provider location):  [facility]    Mode of Communication:  Video Conference via InMyRoom      ------------------------------------------------------------------------------------------------------------------------------    FOLLOW UP  -  TELEMEDICINE VISIT    Reason for Visit:  Biochemically-recurrent prostate cancer with solitary retroperitoneal amy metastasis s/p metastasectomy.    History of Present Illness:  Mr. Gao is a 57-year old man who was diagnosed with prostate cancer in 10/2010, at age 45.  His PSA level at that time was 3.7 ng/mL.  He underwent a radical prostatectomy on 12/6/2010, which revealed prostatic acinar adenocarcinoma, [place] 4+4=8, with organ-confined disease, no extraprostatic extension or seminal vesicle invasion, 0/16 positive lymph nodes, but he did have a positive apical surgical margin.  His final pathological stage was pT2b N0 R1.  [facility] NGS analysis revealed a TMPRSS2-ETV5 fusion and a PTEN loss, TMB 3 muts/Mb, and gLOH 21% (high).    Following surgery, he developed a biochemical recurrence with a PSA level reaching 0.34 ng/mL by 8/2013.  He was then treated with salvage external-beam radiotherapy using 7020 cGy over 39 fractions, ending in 12/2013.  He also received concurrent androgen deprivation therapy for 12 months, ending in 9/2014.  His PSA level then remained undetectable for approximately 6 years.  Unfortunately, in 3/2020, he developed a further biochemical recurrence.  His PSA on 3/3/2020 was 0.2, the PSA on 11/20/2020 was 0.3, the PSA on 4/23/2021 was 0.6,  and the PSA on 2/17/2022 was 1.59 ng/mL.    The patient then underwent a PSMA-PET scan on 2/17/2022.  This showed a PSMA-avid right iliac lymph node metastasis, without other evidence of local recurrence or distant spread.  On 4/29/2022, he underwent surgical metastasectomy with Dr Antoine without any additional ADT at that time.  As a result, his PSA level has dropped down to 0.32 ng/mL (9/9/2022).  The PSA on 11/10/22 was 0.48 ng/mL, and the PSA on 1/10/23 was 0.70 ng/mL.  He returns today for a follow-up visit, and to discuss management options moving forward.  He wishes to avoid hormonal therapy if possible.  His current PSA is 0.93 ng/mL.    Review of Systems:  The patient reports feeling generally well.  He does have erectile dysfunction, which has been a problem for him since his radical prostatectomy.  He does not report any additional new signs or symptoms at this time.  He has not had any changes in his weight.  He does not have any cancer-related pain.  A comprehensive 14-system review of symptoms is otherwise generally within normal limits.  His ECOG score is 0.  His pain score is 0/10.    Medications:  Rosuvastatin 10 mg  Bupropion 300 mg  Fluticasone nasal spray  Ibuprofen PRN    Physical Examination:  Mr. Gao is a 57-year-old man who appears comfortable at rest.  His vital signs are unremarkable.  His HEENT examination is within normal limits.  There is no palpable lymphadenopathy.  The cardiac, pulmonary, and gastrointestinal examinations are within normal limits.  The neuromuscular examination is intact.  The extremities do not demonstrate pitting edema.  The skin evaluation is unremarkable.    PSMA-PET scan (2/17/22):  This showed a PSMA-avid right iliac lymph node metastasis, without other evidence of local recurrence or distant spread.    Surgical Pathology (4/29/22):  He underwent surgical metastasectomy of a right retroperitoneal lymph node on 4/29/22.  This showed metastatic prostatic  adenocarcinoma (2.5 cm in greatest diameter) with extranodal extension.  This was sent for [employer] somatic DNA analysis, and showed a TMPRSS2-ETV5 fusion and a PTEN loss, TMB 3 muts/Mb, and gLOH 21% (high).     Laboratories (3/13/23):  His PSA level (on 9/9/22) was 0.32 ng/mL, which has dropped from 1.59 ng/mL after metastasectomy.  The PSA on 11/10/22 was 0.48 ng/mL, and the PSA on 1/10/23 was 0.70 ng/mL. Today (3/13/23), the PSA has risen to 0.93 ng/mL.  Testosterone is 185 ng/dL.      ASSESSMENT AND PLAN:  Mr. Gao is a 57-year-old man with a history of high-risk localized prostate cancer (pT2b N0 R1, [place] 4+4=8, PSA 3.7 ng/mL) who underwent radical prostatectomy followed by salvage radiotherapy.  He subsequently developed a biochemical recurrence with a PSA level of 1.59 ng/mL and a PSMA-PET scan showing a solitary right iliac amy metastasis which was surgically resected on 4/29/2022.  The current PSA is back up to 0.93 ng/mL.  His ECOG score is 0.  His pain score is 0/10.    The patient previously developed a single metastatic recurrence involving a right iliac lymph node, without evidence of additional local or distant metastatic deposits.  On 4/29/2022, he underwent successful surgical metastasectomy which confirmed metastatic prostatic adenocarcinoma.  Following resection, his PSA level dropped from 1.59 ng/mL down to 0.32 ng/mL.  However, the current PSA is back up to 0.93 ng/mL.  Moving forward, he will undergo PSA testing once every 2-3 months.  If his PSA level rises above 1.0 ng/mL in the future, then he might be a candidate for another PSMA-PET scan at that time.  Alternatively, he could begin a cycle (e.g. six months) of intermittent ADT, although the patient has been generally reluctant to receive hormonal therapy.  At the end of our consultation, we have decided to simply repeat the PSA test on 5/1/23, and I will conduct a video visit with him on 5/3/23.  I will order a PSMA scan once the PSA  level crosses 1.0 ng/mL.  The patient was encouraged to ask multiple questions today, all of which were answered to his satisfaction.    A total of 40 minutes were spent in consultation with the patient on the day of the encounter, of which more than 50% of this time was used for counseling and coordination of care.    Al Morocho M.D.

## 2023-03-15 NOTE — NURSING NOTE
Is the patient currently in the state of [place]? YES    Visit mode:VIDEO    If the visit is dropped, the patient can be reconnected by: VIDEO VISIT: Text to cell phone: [phone]    Will anyone else be joining the visit? Yes, spouse Hugo will be there with him.       How would you like to obtain your AVS? MyChart    Are changes needed to the allergy or medication list? NO  Renee Maharaj    Reason for visit: Follow up

## 2023-03-21 ENCOUNTER — TELEPHONE (OUTPATIENT)
Dept: ONCOLOGY | Facility: CLINIC | Age: 58
End: 2023-03-21
Payer: COMMERCIAL

## 2023-03-21 NOTE — PROGRESS NOTES
CLINICAL NUTRITION SERVICES     Reason for Contact: Questions from Oncology Distress Screening  1. How concerned are you about your ability to eat? :  0  2. How concerned are you about unintended weight loss or your current weight? : 0  3. Patient requested to speak with a Dietitian on the Oncology Distress Screening tool. Yes    Action: RD called patient indicating reason for phone call. Left a VM with a return call back number.     Follow up: Wait for a return phone call.    Jackie Wilkins RD, LD  [phone]

## 2023-04-11 ENCOUNTER — PATIENT OUTREACH (OUTPATIENT)
Dept: CARE COORDINATION | Facility: CLINIC | Age: 58
End: 2023-04-11
Payer: COMMERCIAL

## 2023-04-11 NOTE — PROGRESS NOTES
Oncology Distress Screening Follow-up  Clinical Social Work  [facility]    Identified Concern and Score From Distress Screening:    You can also ask to be contacted by one of our Oncology Supportive Care professionals.    9. If you want to be contacted by one of our professionals, I can send a message to them right now.  Oncology Social Worker;Oncology Dietitian;Oncology Psychologist;Oncology Spiritual Care           Date of Distress Screening:  3-15-23      Data:  Pt is 57 year old with diagnosis of metastatic prostate cancer.  Initial diagnosis was on 12-6-10.  At time of last visit, Patient scored positive on distress screen.  called Patient today with intention of introducing them to psychosocial services and support, and following up on elevated distress.        Intervention/Education Provided:  SW attempted to contact pt via phone today to follow up on elevated distress screening.  SW was unable to connect with pt though left message for pt to call this SW back, if pt is able to call back today.  If not, message was left for pt to call back his regular [facility] SW, Rach Gamboa,  [phone].  SW will await return call from pt.        Follow-up Required:   will remain available as needed.    Elier Uribe, Casual SW, for Rach Gamboa, RANJAN  [phone]    Oncology Distress Screening    Row Name 03/15/23 0900       How concerned do you feel regarding the following common issues people with cancer face. Please answer with a number from 0-10 where 0=not concerned and 10=very concerned. PT response of >=8  will result in outreach from Support Team.   1. How concerned are you about your ability to eat?  0       2. How concerned are you about unintended weight loss or your current weight?  0       3. How concerned are you about feeling depressed or very sad?  2       4. How concerned are you about feeling anxious or very scared?  3       5. Do you struggle with the loss of meaning  and miguel in your life?  Not at all       6. How concerned are you about work and home life issues that may be affected by your cancer?  3       7. How concerned are you about knowing what resources are available to help you?  0       8. Do you currently have what you would describe as Faith or spiritual struggles?             Not at all       You can also ask to be contacted by one of our Oncology Supportive Care professionals.    9. If you want to be contacted by one of our professionals, I can send a message to them right now.  Oncology Social Worker;Oncology Dietitian;Oncology Psychologist;Oncology Spiritual Care

## 2023-05-01 ENCOUNTER — LAB (OUTPATIENT)
Dept: LAB | Facility: CLINIC | Age: 58
End: 2023-05-01
Payer: COMMERCIAL

## 2023-05-01 DIAGNOSIS — C61 MALIGNANT NEOPLASM OF PROSTATE (H): ICD-10-CM

## 2023-05-01 LAB — PSA SERPL DL<=0.01 NG/ML-MCNC: 1.21 NG/ML (ref 0–3.5)

## 2023-05-01 PROCEDURE — 84153 ASSAY OF PSA TOTAL: CPT | Performed by: PATHOLOGY

## 2023-05-01 PROCEDURE — 36415 COLL VENOUS BLD VENIPUNCTURE: CPT | Performed by: PATHOLOGY

## 2023-05-01 PROCEDURE — 99000 SPECIMEN HANDLING OFFICE-LAB: CPT | Performed by: PATHOLOGY

## 2023-05-01 PROCEDURE — 84403 ASSAY OF TOTAL TESTOSTERONE: CPT | Mod: 90 | Performed by: PATHOLOGY

## 2023-05-03 LAB — TESTOST SERPL-MCNC: 122 NG/DL (ref 240–950)

## 2023-05-08 NOTE — PROGRESS NOTES
Virtual Visit Details    Type of service:  Video Visit     Originating Location (pt. Location):  Home    Distant Location (provider location):  [facility]    Platform used for Video Visit:  Kerwin      ------------------------------------------------------------------------------------------------------------------------------------------------    FOLLOW UP  -  TELEMEDICINE VISIT    Reason for Visit:  Biochemically-recurrent prostate cancer with solitary retroperitoneal amy metastasis s/p metastasectomy.    History of Present Illness:  Mr. Gao is a 57-year old man who was diagnosed with prostate cancer in 10/2010, at age 45.  His PSA level at that time was 3.7 ng/mL.  He underwent a radical prostatectomy on 12/6/2010, which revealed prostatic acinar adenocarcinoma, [place] 4+4=8, with organ-confined disease, no extraprostatic extension or seminal vesicle invasion, 0/16 positive lymph nodes, but he did have a positive apical surgical margin.  His final pathological stage was pT2b N0 R1.  [facility] NGS analysis revealed a TMPRSS2-ETV5 fusion and a PTEN deletion, TMB 3 muts/Mb, and gLOH 21% (high).    Following surgery, he developed a biochemical recurrence with a PSA level reaching 0.34 ng/mL by 8/2013.  He was then treated with salvage external-beam radiotherapy using 7020 cGy over 39 fractions, ending in 12/2013.  He also received concurrent androgen deprivation therapy for 12 months, ending in 9/2014.  His PSA level then remained undetectable for approximately 6 years.  Unfortunately, in 3/2020, he developed a further biochemical recurrence.  His PSA on 3/3/2020 was 0.2, the PSA on 11/20/2020 was 0.3, the PSA on 4/23/2021 was 0.6, and the PSA on 2/17/2022 was 1.59 ng/mL.    The patient then underwent a PSMA-PET scan on 2/17/2022.  This showed a PSMA-avid right iliac lymph node metastasis, without other evidence of local recurrence or distant spread.  On 4/29/2022, he underwent  surgical metastasectomy with Dr Antoine without any additional ADT at that time.  As a result, his PSA level has dropped down to 0.32 ng/mL (9/9/2022).  The PSA on 11/10/22 was 0.48 ng/mL, and the PSA on 1/10/23 was 0.70 ng/mL.  He returns today for a follow-up visit, and to discuss management options moving forward.  He wishes to avoid hormonal therapy if possible.  His current PSA is 1.21 ng/mL.  We conducted the encounter by way of a video visit today.    Review of Systems:  The patient reports feeling generally well.  He does have erectile dysfunction, which has been a problem for him since his radical prostatectomy.  He does not report any additional new signs or symptoms at this time.  He has not had any changes in his weight.  He does not have any cancer-related pain.  A comprehensive 14-system review of symptoms is otherwise generally within normal limits.  His ECOG score is 0.  His pain score is 0/10.    Medications:  Rosuvastatin 10 mg  Bupropion 300 mg  Fluticasone nasal spray  Ibuprofen PRN    Physical Examination:  Mr. Gao is a 57-year-old man who appears comfortable at rest.  His vital signs are unremarkable.  His HEENT examination is within normal limits.  There is no palpable lymphadenopathy.  The cardiac, pulmonary, and gastrointestinal examinations are within normal limits.  The neuromuscular examination is intact.  The extremities do not demonstrate pitting edema.  The skin evaluation is unremarkable.    PSMA-PET scan (2/17/22):  This showed a PSMA-avid right iliac lymph node metastasis, without other evidence of local recurrence or distant spread.    Surgical Pathology (4/29/22):  He underwent surgical metastasectomy of a right retroperitoneal lymph node on 4/29/22.  This showed metastatic prostatic adenocarcinoma (2.5 cm in greatest diameter) with extranodal extension.  This was sent for [facility] somatic DNA analysis, and showed a TMPRSS2-ETV5 fusion and a PTEN loss, TMB 3 muts/Mb, and gLOH 21%  (high).     Laboratories:  His PSA level (on 9/9/22) was 0.32 ng/mL, which has dropped from 1.59 ng/mL after metastasectomy.  The PSA on 11/10/22 was 0.48 ng/mL, and the PSA on 1/10/23 was 0.70 ng/mL. On 3/13/23, the PSA has risen to 0.93 ng/mL.  Testosterone wass 185 ng/dL.  On 5/1/2023, PSA was 1.21 ng/mL with a normal testosterone level.      ASSESSMENT AND PLAN:  Mr. Gao is a 57-year-old man with a history of high-risk localized prostate cancer (pT2b N0 R1, [place] 4+4=8, PSA 3.7 ng/mL) who underwent radical prostatectomy followed by salvage radiotherapy.  He subsequently developed a biochemical recurrence with a PSA level of 1.59 ng/mL and a PSMA-PET scan showing a solitary right iliac amy metastasis which was surgically resected on 4/29/2022.  The current PSA is back up to 1.21 ng/mL.  His ECOG score is 0.  His pain score is 0/10.    The patient previously developed a single metastatic recurrence involving a right iliac lymph node, without evidence of additional local or distant metastatic deposits.  On 4/29/2022, he underwent successful surgical metastasectomy which confirmed metastatic prostatic adenocarcinoma.  Following resection, his PSA level dropped from 1.59 ng/mL down to 0.32 ng/mL.  However, the current PSA is back up to 1.21 ng/mL.  Since his PSA level has risen above 1.0 ng/mL, then he might be a candidate for another PSMA-PET scan at this time.  Alternatively, he could begin another cycle (e.g. six months) of intermittent ADT, although the patient has been generally reluctant to receive hormonal therapy.  At the end of our consultation, we have decided to simply repeat the PSMA-PET now, and to decide upon treatment options after obtaining those results.  The patient was encouraged to ask multiple questions today, all of which were answered to his satisfaction.    A total of 40 minutes were spent in consultation with the patient on the day of the encounter, of which more than 50% of this time  was used for counseling and coordination of care.    Al Morocho M.D.

## 2023-05-10 ENCOUNTER — VIRTUAL VISIT (OUTPATIENT)
Dept: ONCOLOGY | Facility: CLINIC | Age: 58
End: 2023-05-10
Attending: INTERNAL MEDICINE
Payer: COMMERCIAL

## 2023-05-10 DIAGNOSIS — C61 MALIGNANT NEOPLASM OF PROSTATE (H): Primary | ICD-10-CM

## 2023-05-10 PROCEDURE — 99215 OFFICE O/P EST HI 40 MIN: CPT | Mod: VID | Performed by: INTERNAL MEDICINE

## 2023-05-10 NOTE — LETTER
5/10/2023         RE: Duglas Gao  [address] [place] [address]        Dear Colleague,    Thank you for referring your patient, Duglas Gao, to the [facility]. Please see a copy of my visit note below.      Virtual Visit Details    Type of service:  Video Visit     Originating Location (pt. Location):  Home    Distant Location (provider location):  [facility]    Platform used for Video Visit:  AmWell      ------------------------------------------------------------------------------------------------------------------------------------------------    FOLLOW UP  -  TELEMEDICINE VISIT    Reason for Visit:  Biochemically-recurrent prostate cancer with solitary retroperitoneal amy metastasis s/p metastasectomy.    History of Present Illness:  Mr. Gao is a 57-year old man who was diagnosed with prostate cancer in 10/2010, at age 45.  His PSA level at that time was 3.7 ng/mL.  He underwent a radical prostatectomy on 12/6/2010, which revealed prostatic acinar adenocarcinoma, [place] 4+4=8, with organ-confined disease, no extraprostatic extension or seminal vesicle invasion, 0/16 positive lymph nodes, but he did have a positive apical surgical margin.  His final pathological stage was pT2b N0 R1.  [facility] NGS analysis revealed a TMPRSS2-ETV5 fusion and a PTEN deletion, TMB 3 muts/Mb, and gLOH 21% (high).    Following surgery, he developed a biochemical recurrence with a PSA level reaching 0.34 ng/mL by 8/2013.  He was then treated with salvage external-beam radiotherapy using 7020 cGy over 39 fractions, ending in 12/2013.  He also received concurrent androgen deprivation therapy for 12 months, ending in 9/2014.  His PSA level then remained undetectable for approximately 6 years.  Unfortunately, in 3/2020, he developed a further biochemical recurrence.  His PSA on 3/3/2020 was 0.2, the PSA on 11/20/2020 was 0.3, the PSA on 4/23/2021 was  0.6, and the PSA on 2/17/2022 was 1.59 ng/mL.    The patient then underwent a PSMA-PET scan on 2/17/2022.  This showed a PSMA-avid right iliac lymph node metastasis, without other evidence of local recurrence or distant spread.  On 4/29/2022, he underwent surgical metastasectomy with Dr Antoine without any additional ADT at that time.  As a result, his PSA level has dropped down to 0.32 ng/mL (9/9/2022).  The PSA on 11/10/22 was 0.48 ng/mL, and the PSA on 1/10/23 was 0.70 ng/mL.  He returns today for a follow-up visit, and to discuss management options moving forward.  He wishes to avoid hormonal therapy if possible.  His current PSA is 1.21 ng/mL.  We conducted the encounter by way of a video visit today.    Review of Systems:  The patient reports feeling generally well.  He does have erectile dysfunction, which has been a problem for him since his radical prostatectomy.  He does not report any additional new signs or symptoms at this time.  He has not had any changes in his weight.  He does not have any cancer-related pain.  A comprehensive 14-system review of symptoms is otherwise generally within normal limits.  His ECOG score is 0.  His pain score is 0/10.    Medications:  Rosuvastatin 10 mg  Bupropion 300 mg  Fluticasone nasal spray  Ibuprofen PRN    Physical Examination:  Mr. Gao is a 57-year-old man who appears comfortable at rest.  His vital signs are unremarkable.  His HEENT examination is within normal limits.  There is no palpable lymphadenopathy.  The cardiac, pulmonary, and gastrointestinal examinations are within normal limits.  The neuromuscular examination is intact.  The extremities do not demonstrate pitting edema.  The skin evaluation is unremarkable.    PSMA-PET scan (2/17/22):  This showed a PSMA-avid right iliac lymph node metastasis, without other evidence of local recurrence or distant spread.    Surgical Pathology (4/29/22):  He underwent surgical metastasectomy of a right  retroperitoneal lymph node on 4/29/22.  This showed metastatic prostatic adenocarcinoma (2.5 cm in greatest diameter) with extranodal extension.  This was sent for [employer] [facility] somatic DNA analysis, and showed a TMPRSS2-ETV5 fusion and a PTEN loss, TMB 3 muts/Mb, and gLOH 21% (high).     Laboratories:  His PSA level (on 9/9/22) was 0.32 ng/mL, which has dropped from 1.59 ng/mL after metastasectomy.  The PSA on 11/10/22 was 0.48 ng/mL, and the PSA on 1/10/23 was 0.70 ng/mL. On 3/13/23, the PSA has risen to 0.93 ng/mL.  Testosterone wass 185 ng/dL.  On 5/1/2023, PSA was 1.21 ng/mL with a normal testosterone level.      ASSESSMENT AND PLAN:  Mr. Gao is a 57-year-old man with a history of high-risk localized prostate cancer (pT2b N0 R1, [place] 4+4=8, PSA 3.7 ng/mL) who underwent radical prostatectomy followed by salvage radiotherapy.  He subsequently developed a biochemical recurrence with a PSA level of 1.59 ng/mL and a PSMA-PET scan showing a solitary right iliac amy metastasis which was surgically resected on 4/29/2022.  The current PSA is back up to 1.21 ng/mL.  His ECOG score is 0.  His pain score is 0/10.    The patient previously developed a single metastatic recurrence involving a right iliac lymph node, without evidence of additional local or distant metastatic deposits.  On 4/29/2022, he underwent successful surgical metastasectomy which confirmed metastatic prostatic adenocarcinoma.  Following resection, his PSA level dropped from 1.59 ng/mL down to 0.32 ng/mL.  However, the current PSA is back up to 1.21 ng/mL.  Since his PSA level has risen above 1.0 ng/mL, then he might be a candidate for another PSMA-PET scan at this time.  Alternatively, he could begin another cycle (e.g. six months) of intermittent ADT, although the patient has been generally reluctant to receive hormonal therapy.  At the end of our consultation, we have decided to simply repeat the PSMA-PET now, and to decide upon treatment options  after obtaining those results.  The patient was encouraged to ask multiple questions today, all of which were answered to his satisfaction.    A total of 40 minutes were spent in consultation with the patient on the day of the encounter, of which more than 50% of this time was used for counseling and coordination of care.    Al Morocho M.D.

## 2023-05-10 NOTE — NURSING NOTE
Is the patient currently in the state of [place]? YES    Visit mode:VIDEO    If the visit is dropped, the patient can be reconnected by: VIDEO VISIT: Send to e-mail at: [email or web address]    Will anyone else be joining the visit? NO      How would you like to obtain your AVS? MyChart    Are changes needed to the allergy or medication list? YES: Pt is allergic to hay fever.     Reason for visit: Video Visit (Return [facility])  Unable to complete distress screening due to time constraint.    Aure Ruiz VF

## 2023-05-30 ENCOUNTER — HOSPITAL ENCOUNTER (OUTPATIENT)
Dept: PET IMAGING | Facility: CLINIC | Age: 58
Discharge: HOME | End: 2023-05-30
Attending: INTERNAL MEDICINE | Admitting: INTERNAL MEDICINE
Payer: COMMERCIAL

## 2023-05-30 DIAGNOSIS — C61 MALIGNANT NEOPLASM OF PROSTATE (H): ICD-10-CM

## 2023-05-30 LAB
CREAT BLD-MCNC: 1.3 MG/DL (ref 0.7–1.3)
GFR SERPL CREATININE-BSD FRML MDRD: >60 ML/MIN/1.73M2

## 2023-05-30 PROCEDURE — 74177 CT ABD & PELVIS W/CONTRAST: CPT

## 2023-05-30 PROCEDURE — 78815 PET IMAGE W/CT SKULL-THIGH: CPT | Mod: PS

## 2023-05-30 PROCEDURE — 250N000011 HC RX IP 250 OP 636: Performed by: INTERNAL MEDICINE

## 2023-05-30 PROCEDURE — 78815 PET IMAGE W/CT SKULL-THIGH: CPT | Mod: 26 | Performed by: RADIOLOGY

## 2023-05-30 PROCEDURE — 343N000001 HC RX 343: Performed by: INTERNAL MEDICINE

## 2023-05-30 PROCEDURE — A9596 HC RX 343: HCPCS | Performed by: INTERNAL MEDICINE

## 2023-05-30 PROCEDURE — 82565 ASSAY OF CREATININE: CPT

## 2023-05-30 RX ORDER — IOPAMIDOL 755 MG/ML
0-135 INJECTION, SOLUTION INTRAVASCULAR ONCE
Status: COMPLETED | OUTPATIENT
Start: 2023-05-30 | End: 2023-05-30

## 2023-05-30 RX ADMIN — IOPAMIDOL 121 ML: 755 INJECTION, SOLUTION INTRAVENOUS at 14:42

## 2023-05-30 RX ADMIN — KIT FOR THE PREPARATION OF GALLIUM GA 68 GOZETOTIDE INJECTION 5.12 MILLICURIE: KIT INTRAVENOUS at 13:45

## 2023-05-30 NOTE — PROGRESS NOTES
Virtual Visit Details    Type of service:  Video Visit     Originating Location (pt. Location):  Home    Distant Location (provider location):  [facility]    Platform used for Video Visit:  Kerwin      --------------------------------------------------------------------------------------------------------------------------------------------------------------------    FOLLOW UP  -  TELEMEDICINE VISIT    Reason for Visit:  Biochemically-recurrent prostate cancer with solitary retroperitoneal amy metastasis s/p metastasectomy.    History of Present Illness:  Mr. Gao is a 57-year old man who was diagnosed with prostate cancer in 10/2010, at age 45.  His PSA level at that time was 3.7 ng/mL.  He underwent a radical prostatectomy on 12/6/2010, which revealed prostatic acinar adenocarcinoma, [place] 4+4=8, with organ-confined disease, no extraprostatic extension or seminal vesicle invasion, 0/16 positive lymph nodes, but he did have a positive apical surgical margin.  His final pathological stage was pT2b N0 R1.  [facility] NGS analysis revealed a TMPRSS2-ETV5 fusion and a PTEN deletion, TMB 3 muts/Mb, and gLOH 21% (high).    Following surgery, he developed a biochemical recurrence with a PSA level reaching 0.34 ng/mL by 8/2013.  He was then treated with salvage external-beam radiotherapy using 7020 cGy over 39 fractions, ending in 12/2013.  He also received concurrent androgen deprivation therapy for 12 months, ending in 9/2014.  His PSA level then remained undetectable for approximately 6 years.  Unfortunately, in 3/2020, he developed a further biochemical recurrence.  His PSA on 3/3/2020 was 0.2, the PSA on 11/20/2020 was 0.3, the PSA on 4/23/2021 was 0.6, and the PSA on 2/17/2022 was 1.59 ng/mL.    The patient then underwent a PSMA-PET scan on 2/17/2022.  This showed a PSMA-avid right iliac lymph node metastasis, without other evidence of local recurrence or distant spread.  On  4/29/2022, he underwent surgical metastasectomy with Dr Antoine without any additional ADT at that time.  As a result, his PSA level has dropped down to 0.32 ng/mL (9/9/2022).  The PSA on 11/10/22 was 0.48 ng/mL, and the PSA on 1/10/23 was 0.70 ng/mL.  He returns today for a follow-up visit, and to discuss management options moving forward.  He wishes to avoid hormonal therapy if possible.  His current PSA is 1.21 ng/mL.  He underwent a PSMA-PET scan yesterday, which showed 6 small but tracer-avid peritoneal metastases.  We conducted the encounter by way of a video visit today, in order to discuss management options moving forward.    Review of Systems:  The patient reports feeling generally well.  He does have erectile dysfunction, which has been a problem for him since his radical prostatectomy.  He does not report any additional new signs or symptoms at this time.  He has not had any changes in his weight.  He does not have any cancer-related pain.  A comprehensive 14-system review of symptoms is otherwise generally within normal limits.  His ECOG score is 0.  His pain score is 0/10.    Medications:  Rosuvastatin 10 mg  Bupropion 300 mg  Fluticasone nasal spray  Ibuprofen PRN    Physical Examination:  Mr. Gao is a 57-year-old man who appears comfortable at rest.  His vital signs are unremarkable.  His HEENT examination is within normal limits.  There is no palpable lymphadenopathy.  The cardiac, pulmonary, and gastrointestinal examinations are within normal limits.  The neuromuscular examination is intact.  The extremities do not demonstrate pitting edema.  The skin evaluation is unremarkable.    Surgical Pathology (4/29/22):  He underwent surgical metastasectomy of a right retroperitoneal lymph node on 4/29/22.  This showed metastatic prostatic adenocarcinoma (2.5 cm in greatest diameter) with extranodal extension.  This was sent for [employer] somatic DNA analysis, and showed a TMPRSS2-ETV5 fusion and a PTEN  loss, TMB 3 muts/Mb, and gLOH 21% (high).     Laboratories:  His PSA level (on 9/9/22) was 0.32 ng/mL, which has dropped from 1.59 ng/mL after metastasectomy.  The PSA on 11/10/22 was 0.48 ng/mL, and the PSA on 1/10/23 was 0.70 ng/mL. On 3/13/23, the PSA has risen to 0.93 ng/mL.  Testosterone wass 185 ng/dL.  On 5/1/2023, PSA was 1.21 ng/mL with a normal testosterone level.    PSMA-PET scan (5/30/23):  This showed six small radiotracer-avid enhancing peritoneal deposits consistent with metastases. This pattern is more typical for peritoneal implants, and is atypical for prostate cancer, but may represent a true metastatic recurrence given the rising PSA level.      ASSESSMENT AND PLAN:  Mr. Gao is a 57-year-old man with a history of high-risk localized prostate cancer (pT2b N0 R1, [place] 4+4=8, PSA 3.7 ng/mL) who underwent radical prostatectomy followed by salvage radiotherapy.  He subsequently developed a biochemical recurrence with a PSA level of 1.59 ng/mL and a PSMA-PET scan showing a solitary right iliac amy metastasis which was surgically resected on 4/29/2022.  The current PSA is back up to 1.21 ng/mL.  His ECOG score is 0.  His pain score is 0/10.    The patient previously developed a single metastatic recurrence involving a right iliac lymph node, without evidence of additional local or distant metastatic deposits.  On 4/29/2022, he underwent successful surgical metastasectomy which confirmed metastatic prostatic adenocarcinoma.  Following resection, his PSA level dropped from 1.59 ng/mL down to 0.32 ng/mL.  However, the current PSA is back up to 1.21 ng/mL.  Since his PSA level has risen above 1.0 ng/mL, we decided to obtain another PSMA-PET scan at this time.  Unfortunately, his current PSMA scan suggests peritoneal implants which are not common in prostate cancer, but these could certainly explain his rising PSA level.  I presented him with several options.  First, he could begin another cycle  (e.g. six months) of intermittent ADT, although the patient has been generally reluctant to receive hormonal therapy.  Alternatively, we could manage him with observation alone and then repeat another PSMA-PET scan in 6 to 12 months, depending on the rate of PSA elevation moving forward.  In terms of radiotherapy, I will discuss this case with my colleagues but it is unlikely that further radiation would be indicated.  At the end of our consultation, we have decided to simply repeat the PSMA-PET at a later interval, and to decide upon treatment options after obtaining those results.  The patient was allowed to ask multiple questions today, all of which were answered to his satisfaction.    A total of 40 minutes were spent in consultation with the patient on the day of the encounter, of which more than 50% of this time was used for counseling and coordination of care.    Al Morocho M.D.

## 2023-05-30 NOTE — DISCHARGE INSTRUCTIONS

## 2023-05-31 ENCOUNTER — VIRTUAL VISIT (OUTPATIENT)
Dept: ONCOLOGY | Facility: CLINIC | Age: 58
End: 2023-05-31
Attending: INTERNAL MEDICINE
Payer: COMMERCIAL

## 2023-05-31 DIAGNOSIS — C61 MALIGNANT NEOPLASM OF PROSTATE (H): Primary | ICD-10-CM

## 2023-05-31 PROCEDURE — 99215 OFFICE O/P EST HI 40 MIN: CPT | Mod: VID | Performed by: INTERNAL MEDICINE

## 2023-05-31 NOTE — LETTER
5/31/2023         RE: Duglas Gao  [address] [place] [address]        Dear Colleague,    Thank you for referring your patient, Duglas Gao, to the [facility]CER [facility]. Please see a copy of my visit note below.      Virtual Visit Details    Type of service:  Video Visit     Originating Location (pt. Location):  Home    Distant Location (provider location):  [facility]    Platform used for Video Visit:  AmWell      --------------------------------------------------------------------------------------------------------------------------------------------------------------------    FOLLOW UP  -  TELEMEDICINE VISIT    Reason for Visit:  Biochemically-recurrent prostate cancer with solitary retroperitoneal amy metastasis s/p metastasectomy.    History of Present Illness:  Mr. Gao is a 57-year old man who was diagnosed with prostate cancer in 10/2010, at age 45.  His PSA level at that time was 3.7 ng/mL.  He underwent a radical prostatectomy on 12/6/2010, which revealed prostatic acinar adenocarcinoma, [place] 4+4=8, with organ-confined disease, no extraprostatic extension or seminal vesicle invasion, 0/16 positive lymph nodes, but he did have a positive apical surgical margin.  His final pathological stage was pT2b N0 R1.  [facility] NGS analysis revealed a TMPRSS2-ETV5 fusion and a PTEN deletion, TMB 3 muts/Mb, and gLOH 21% (high).    Following surgery, he developed a biochemical recurrence with a PSA level reaching 0.34 ng/mL by 8/2013.  He was then treated with salvage external-beam radiotherapy using 7020 cGy over 39 fractions, ending in 12/2013.  He also received concurrent androgen deprivation therapy for 12 months, ending in 9/2014.  His PSA level then remained undetectable for approximately 6 years.  Unfortunately, in 3/2020, he developed a further biochemical recurrence.  His PSA on 3/3/2020 was 0.2, the PSA on 11/20/2020 was 0.3, the  PSA on 4/23/2021 was 0.6, and the PSA on 2/17/2022 was 1.59 ng/mL.    The patient then underwent a PSMA-PET scan on 2/17/2022.  This showed a PSMA-avid right iliac lymph node metastasis, without other evidence of local recurrence or distant spread.  On 4/29/2022, he underwent surgical metastasectomy with Dr Antoine without any additional ADT at that time.  As a result, his PSA level has dropped down to 0.32 ng/mL (9/9/2022).  The PSA on 11/10/22 was 0.48 ng/mL, and the PSA on 1/10/23 was 0.70 ng/mL.  He returns today for a follow-up visit, and to discuss management options moving forward.  He wishes to avoid hormonal therapy if possible.  His current PSA is 1.21 ng/mL.  He underwent a PSMA-PET scan yesterday, which showed 6 small but tracer-avid peritoneal metastases.  We conducted the encounter by way of a video visit today, in order to discuss management options moving forward.    Review of Systems:  The patient reports feeling generally well.  He does have erectile dysfunction, which has been a problem for him since his radical prostatectomy.  He does not report any additional new signs or symptoms at this time.  He has not had any changes in his weight.  He does not have any cancer-related pain.  A comprehensive 14-system review of symptoms is otherwise generally within normal limits.  His ECOG score is 0.  His pain score is 0/10.    Medications:  Rosuvastatin 10 mg  Bupropion 300 mg  Fluticasone nasal spray  Ibuprofen PRN    Physical Examination:  Mr. Gao is a 57-year-old man who appears comfortable at rest.  His vital signs are unremarkable.  His HEENT examination is within normal limits.  There is no palpable lymphadenopathy.  The cardiac, pulmonary, and gastrointestinal examinations are within normal limits.  The neuromuscular examination is intact.  The extremities do not demonstrate pitting edema.  The skin evaluation is unremarkable.    Surgical Pathology (4/29/22):  He underwent surgical  metastasectomy of a right retroperitoneal lymph node on 4/29/22.  This showed metastatic prostatic adenocarcinoma (2.5 cm in greatest diameter) with extranodal extension.  This was sent for [employer] somatic DNA analysis, and showed a TMPRSS2-ETV5 fusion and a PTEN loss, TMB 3 muts/Mb, and gLOH 21% (high).     Laboratories:  His PSA level (on 9/9/22) was 0.32 ng/mL, which has dropped from 1.59 ng/mL after metastasectomy.  The PSA on 11/10/22 was 0.48 ng/mL, and the PSA on 1/10/23 was 0.70 ng/mL. On 3/13/23, the PSA has risen to 0.93 ng/mL.  Testosterone wass 185 ng/dL.  On 5/1/2023, PSA was 1.21 ng/mL with a normal testosterone level.    PSMA-PET scan (5/30/23):  This showed six small radiotracer-avid enhancing peritoneal deposits consistent with metastases. This pattern is more typical for peritoneal implants, and is atypical for prostate cancer, but may represent a true metastatic recurrence given the rising PSA level.      ASSESSMENT AND PLAN:  Mr. Gao is a 57-year-old man with a history of high-risk localized prostate cancer (pT2b N0 R1, [place] 4+4=8, PSA 3.7 ng/mL) who underwent radical prostatectomy followed by salvage radiotherapy.  He subsequently developed a biochemical recurrence with a PSA level of 1.59 ng/mL and a PSMA-PET scan showing a solitary right iliac amy metastasis which was surgically resected on 4/29/2022.  The current PSA is back up to 1.21 ng/mL.  His ECOG score is 0.  His pain score is 0/10.    The patient previously developed a single metastatic recurrence involving a right iliac lymph node, without evidence of additional local or distant metastatic deposits.  On 4/29/2022, he underwent successful surgical metastasectomy which confirmed metastatic prostatic adenocarcinoma.  Following resection, his PSA level dropped from 1.59 ng/mL down to 0.32 ng/mL.  However, the current PSA is back up to 1.21 ng/mL.  Since his PSA level has risen above 1.0 ng/mL, we decided to obtain another PSMA-PET  scan at this time.  Unfortunately, his current PSMA scan suggests peritoneal implants which are not common in prostate cancer, but these could certainly explain his rising PSA level.  I presented him with several options.  First, he could begin another cycle (e.g. six months) of intermittent ADT, although the patient has been generally reluctant to receive hormonal therapy.  Alternatively, we could manage him with observation alone and then repeat another PSMA-PET scan in 6 to 12 months, depending on the rate of PSA elevation moving forward.  In terms of radiotherapy, I will discuss this case with my colleagues but it is unlikely that further radiation would be indicated.  At the end of our consultation, we have decided to simply repeat the PSMA-PET at a later interval, and to decide upon treatment options after obtaining those results.  The patient was allowed to ask multiple questions today, all of which were answered to his satisfaction.    A total of 40 minutes were spent in consultation with the patient on the day of the encounter, of which more than 50% of this time was used for counseling and coordination of care.    Al Morocho M.D.

## 2023-05-31 NOTE — NURSING NOTE
Is the patient currently in the state of [place]? YES    Visit mode:VIDEO    If the visit is dropped, the patient can be reconnected by: VIDEO VISIT: Text to cell phone: [phone]    Will anyone else be joining the visit? Yes, spouse Hugo will be there with him.       How would you like to obtain your AVS? MyChart    Are changes needed to the allergy or medication list? NO    Reason for visit: RECHECK (Video visit )  Renee Maharaj

## 2023-06-08 ENCOUNTER — VIRTUAL VISIT (OUTPATIENT)
Dept: ONCOLOGY | Facility: CLINIC | Age: 58
End: 2023-06-08
Attending: INTERNAL MEDICINE
Payer: COMMERCIAL

## 2023-06-08 ENCOUNTER — TELEPHONE (OUTPATIENT)
Dept: ONCOLOGY | Facility: CLINIC | Age: 58
End: 2023-06-08
Payer: COMMERCIAL

## 2023-06-08 DIAGNOSIS — C61 MALIGNANT NEOPLASM OF PROSTATE (H): Primary | ICD-10-CM

## 2023-06-08 PROCEDURE — 99215 OFFICE O/P EST HI 40 MIN: CPT | Mod: VID | Performed by: INTERNAL MEDICINE

## 2023-06-08 NOTE — PROGRESS NOTES
Virtual Visit Details    Type of service:  Video Visit     Originating Location (pt. Location):  Home    Distant Location (provider location):  [facility]    Platform used for Video Visit:  Kerwin      --------------------------------------------------------------------------------------------------------------------------------------------------------------------    FOLLOW UP  -  TELEMEDICINE VISIT    Reason for Visit:  Biochemically-recurrent prostate cancer with PSMA-positive peritoneal deposits.    History of Present Illness:  Mr. Gao is a 57-year old man who was diagnosed with prostate cancer in 10/2010, at age 45.  His PSA level at that time was 3.7 ng/mL.  He underwent a radical prostatectomy on 12/6/2010, which revealed prostatic acinar adenocarcinoma, [place] 4+4=8, with organ-confined disease, no extraprostatic extension or seminal vesicle invasion, 0/16 positive lymph nodes, but he did have a positive apical surgical margin.  His final pathological stage was pT2b N0 R1.  [facility] NGS analysis revealed a TMPRSS2-ETV5 fusion and a PTEN deletion, TMB 3 muts/Mb, and gLOH 21% (high).    Following surgery, he developed a biochemical recurrence with a PSA level reaching 0.34 ng/mL by 8/2013.  He was then treated with salvage external-beam radiotherapy using 7020 cGy over 39 fractions, ending in 12/2013.  He also received concurrent androgen deprivation therapy for 12 months, ending in 9/2014.  His PSA level then remained undetectable for approximately 6 years.  Unfortunately, in 3/2020, he developed a further biochemical recurrence.  His PSA on 3/3/2020 was 0.2, the PSA on 11/20/2020 was 0.3, the PSA on 4/23/2021 was 0.6, and the PSA on 2/17/2022 was 1.59 ng/mL.    The patient then underwent a PSMA-PET scan on 2/17/2022.  This showed a PSMA-avid right iliac lymph node metastasis, without other evidence of local recurrence or distant spread.  On 4/29/2022, he underwent surgical  metastasectomy with Dr Antoine without any additional ADT at that time.  As a result, his PSA level has dropped down to 0.32 ng/mL (9/9/2022).  The PSA on 11/10/22 was 0.48 ng/mL, and the PSA on 1/10/23 was 0.70 ng/mL.  He returns today for a follow-up visit, and to discuss management options moving forward.  His current PSA is 1.21 ng/mL.  He underwent a PSMA-PET scan last week, which showed 6 small tracer-avid peritoneal metastases.  We conducted the encounter by way of a video visit today, in order to discuss management options moving forward.    Review of Systems:  The patient reports feeling generally well.  He does have erectile dysfunction, which has been a problem for him since his radical prostatectomy.  He does not report any additional new signs or symptoms at this time.  He has not had any changes in his weight.  He does not have any cancer-related pain.  A comprehensive 14-system review of symptoms is otherwise generally within normal limits.  His ECOG score is 0.  His pain score is 0/10.    Medications:  Relugolix - starting soon  Rosuvastatin 10 mg  Bupropion 300 mg  Fluticasone nasal spray  Ibuprofen PRN    Physical Examination:  Mr. Gao is a 57-year-old man who appears comfortable at rest.  His vital signs are unremarkable.  His HEENT examination is within normal limits.  There is no palpable lymphadenopathy.  The cardiac, pulmonary, and gastrointestinal examinations are within normal limits.  The neuromuscular examination is intact.  The extremities do not demonstrate pitting edema.  The skin evaluation is unremarkable.    Surgical Pathology (4/29/22):  He underwent surgical metastasectomy of a right retroperitoneal lymph node on 4/29/22.  This showed metastatic prostatic adenocarcinoma (2.5 cm in greatest diameter) with extranodal extension.  This was sent for [facility] somatic DNA analysis, and showed a TMPRSS2-ETV5 fusion and a PTEN loss, TMB 3 muts/Mb, and gLOH 21% (high).     Laboratories:   His PSA level (on 9/9/22) was 0.32 ng/mL, which has dropped from 1.59 ng/mL after metastasectomy.  The PSA on 11/10/22 was 0.48 ng/mL, and the PSA on 1/10/23 was 0.70 ng/mL. On 3/13/23, the PSA has risen to 0.93 ng/mL.  Testosterone wass 185 ng/dL.  On 5/1/2023, PSA was 1.21 ng/mL with a normal testosterone level.    PSMA-PET scan (5/30/23):  This showed six small radiotracer-avid enhancing peritoneal deposits consistent with metastases. This pattern is more typical for peritoneal implants, and is atypical for prostate cancer, but may represent a true metastatic recurrence given the rising PSA level.      ASSESSMENT AND PLAN:  Mr. Gao is a 57-year-old man with a history of high-risk localized prostate cancer (pT2b N0 R1, [place] 4+4=8, PSA 3.7 ng/mL) who underwent radical prostatectomy followed by salvage radiotherapy.  He subsequently developed a biochemical recurrence with a PSA level of 1.59 ng/mL and a PSMA-PET scan showing a solitary right iliac amy metastasis which was surgically resected on 4/29/2022.  The current PSA is back up to 1.21 ng/mL.  His ECOG score is 0.  His pain score is 0/10.    The patient previously developed a single metastatic recurrence involving a right iliac lymph node, without evidence of additional local or distant metastatic deposits.  On 4/29/2022, he underwent successful surgical metastasectomy which confirmed metastatic prostatic adenocarcinoma.  Following resection, his PSA level dropped from 1.59 ng/mL down to 0.32 ng/mL.  However, the current PSA is back up to 1.21 ng/mL.  Since his PSA level has risen above 1.0 ng/mL, we decided to obtain another PSMA-PET scan at this time.  Unfortunately, his current PSMA scan suggests peritoneal implants which are not common in prostate cancer, but these could certainly explain his rising PSA level.  I presented him with several options.  First, he could begin another cycle (e.g. three to six months) of intermittent ADT, although the  patient has been generally reluctant to receive hormonal therapy.  Alternatively, we could manage him with observation alone and then repeat another PSMA-PET scan in 6 to 12 months, depending on the rate of PSA elevation moving forward.  In terms of radiotherapy, I will discuss this case with my colleagues but it is unlikely that further radiation would be indicated.  At the end of our consultation, we have decided to begin relugolix at this time.  I plan to treat him with 3 months of relugolix therapy, followed by a potential cessation of therapy if he achieves a complete PSA response.  This would potentially be followed by further cycles of intermittent ADT (using relugolix) in the future.      A total of 40 minutes were spent in consultation with the patient on the day of the encounter, of which more than 50% of this time was used for counseling and coordination of care.  The patient was allowed to ask multiple questions today, all of which were answered to his satisfaction.    Al Morocho M.D.

## 2023-06-08 NOTE — LETTER
6/8/2023         RE: Duglas Gao  [address] [place] [address]        Dear Colleague,    Thank you for referring your patient, Duglas Gao, to the [facility]. Please see a copy of my visit note below.      Virtual Visit Details    Type of service:  Video Visit     Originating Location (pt. Location):  Home    Distant Location (provider location):  [facility]    Platform used for Video Visit:  AmWell      --------------------------------------------------------------------------------------------------------------------------------------------------------------------    FOLLOW UP  -  TELEMEDICINE VISIT    Reason for Visit:  Biochemically-recurrent prostate cancer with PSMA-positive peritoneal deposits.    History of Present Illness:  Mr. Gao is a 57-year old man who was diagnosed with prostate cancer in 10/2010, at age 45.  His PSA level at that time was 3.7 ng/mL.  He underwent a radical prostatectomy on 12/6/2010, which revealed prostatic acinar adenocarcinoma, [place] 4+4=8, with organ-confined disease, no extraprostatic extension or seminal vesicle invasion, 0/16 positive lymph nodes, but he did have a positive apical surgical margin.  His final pathological stage was pT2b N0 R1.  [facility] NGS analysis revealed a TMPRSS2-ETV5 fusion and a PTEN deletion, TMB 3 muts/Mb, and gLOH 21% (high).    Following surgery, he developed a biochemical recurrence with a PSA level reaching 0.34 ng/mL by 8/2013.  He was then treated with salvage external-beam radiotherapy using 7020 cGy over 39 fractions, ending in 12/2013.  He also received concurrent androgen deprivation therapy for 12 months, ending in 9/2014.  His PSA level then remained undetectable for approximately 6 years.  Unfortunately, in 3/2020, he developed a further biochemical recurrence.  His PSA on 3/3/2020 was 0.2, the PSA on 11/20/2020 was 0.3, the PSA on 4/23/2021 was 0.6, and  the PSA on 2/17/2022 was 1.59 ng/mL.    The patient then underwent a PSMA-PET scan on 2/17/2022.  This showed a PSMA-avid right iliac lymph node metastasis, without other evidence of local recurrence or distant spread.  On 4/29/2022, he underwent surgical metastasectomy with Dr Antoine without any additional ADT at that time.  As a result, his PSA level has dropped down to 0.32 ng/mL (9/9/2022).  The PSA on 11/10/22 was 0.48 ng/mL, and the PSA on 1/10/23 was 0.70 ng/mL.  He returns today for a follow-up visit, and to discuss management options moving forward.  His current PSA is 1.21 ng/mL.  He underwent a PSMA-PET scan last week, which showed 6 small tracer-avid peritoneal metastases.  We conducted the encounter by way of a video visit today, in order to discuss management options moving forward.    Review of Systems:  The patient reports feeling generally well.  He does have erectile dysfunction, which has been a problem for him since his radical prostatectomy.  He does not report any additional new signs or symptoms at this time.  He has not had any changes in his weight.  He does not have any cancer-related pain.  A comprehensive 14-system review of symptoms is otherwise generally within normal limits.  His ECOG score is 0.  His pain score is 0/10.    Medications:  Relugolix - starting soon  Rosuvastatin 10 mg  Bupropion 300 mg  Fluticasone nasal spray  Ibuprofen PRN    Physical Examination:  Mr. Gao is a 57-year-old man who appears comfortable at rest.  His vital signs are unremarkable.  His HEENT examination is within normal limits.  There is no palpable lymphadenopathy.  The cardiac, pulmonary, and gastrointestinal examinations are within normal limits.  The neuromuscular examination is intact.  The extremities do not demonstrate pitting edema.  The skin evaluation is unremarkable.    Surgical Pathology (4/29/22):  He underwent surgical metastasectomy of a right retroperitoneal lymph node on 4/29/22.   This showed metastatic prostatic adenocarcinoma (2.5 cm in greatest diameter) with extranodal extension.  This was sent for [employer] somatic DNA analysis, and showed a TMPRSS2-ETV5 fusion and a PTEN loss, TMB 3 muts/Mb, and gLOH 21% (high).     Laboratories:  His PSA level (on 9/9/22) was 0.32 ng/mL, which has dropped from 1.59 ng/mL after metastasectomy.  The PSA on 11/10/22 was 0.48 ng/mL, and the PSA on 1/10/23 was 0.70 ng/mL. On 3/13/23, the PSA has risen to 0.93 ng/mL.  Testosterone wass 185 ng/dL.  On 5/1/2023, PSA was 1.21 ng/mL with a normal testosterone level.    PSMA-PET scan (5/30/23):  This showed six small radiotracer-avid enhancing peritoneal deposits consistent with metastases. This pattern is more typical for peritoneal implants, and is atypical for prostate cancer, but may represent a true metastatic recurrence given the rising PSA level.      ASSESSMENT AND PLAN:  Mr. Gao is a 57-year-old man with a history of high-risk localized prostate cancer (pT2b N0 R1, [place] 4+4=8, PSA 3.7 ng/mL) who underwent radical prostatectomy followed by salvage radiotherapy.  He subsequently developed a biochemical recurrence with a PSA level of 1.59 ng/mL and a PSMA-PET scan showing a solitary right iliac amy metastasis which was surgically resected on 4/29/2022.  The current PSA is back up to 1.21 ng/mL.  His ECOG score is 0.  His pain score is 0/10.    The patient previously developed a single metastatic recurrence involving a right iliac lymph node, without evidence of additional local or distant metastatic deposits.  On 4/29/2022, he underwent successful surgical metastasectomy which confirmed metastatic prostatic adenocarcinoma.  Following resection, his PSA level dropped from 1.59 ng/mL down to 0.32 ng/mL.  However, the current PSA is back up to 1.21 ng/mL.  Since his PSA level has risen above 1.0 ng/mL, we decided to obtain another PSMA-PET scan at this time.  Unfortunately, his current PSMA scan suggests  peritoneal implants which are not common in prostate cancer, but these could certainly explain his rising PSA level.  I presented him with several options.  First, he could begin another cycle (e.g. three to six months) of intermittent ADT, although the patient has been generally reluctant to receive hormonal therapy.  Alternatively, we could manage him with observation alone and then repeat another PSMA-PET scan in 6 to 12 months, depending on the rate of PSA elevation moving forward.  In terms of radiotherapy, I will discuss this case with my colleagues but it is unlikely that further radiation would be indicated.  At the end of our consultation, we have decided to begin relugolix at this time.  I plan to treat him with 3 months of relugolix therapy, followed by a potential cessation of therapy if he achieves a complete PSA response.  This would potentially be followed by further cycles of intermittent ADT (using relugolix) in the future.      A total of 40 minutes were spent in consultation with the patient on the day of the encounter, of which more than 50% of this time was used for counseling and coordination of care.  The patient was allowed to ask multiple questions today, all of which were answered to his satisfaction.    Al Morohco M.D.

## 2023-06-08 NOTE — NURSING NOTE
Patient confirms medications and allergies are accurate via patients echeck in completion, and or denies any changes since last reviewed/verified.     Is the patient currently in the state of [place]? YES    Visit mode:VIDEO    If the visit is dropped, the patient can be reconnected by: VIDEO VISIT: Text to cell phone: [phone]    Will anyone else be joining the visit? NO      How would you like to obtain your AVS? MyChart    Are changes needed to the allergy or medication list? NO    Reason for visit: Follow Up            Krystina Angel, Virtual Facilitator

## 2023-06-14 DIAGNOSIS — C61 PROSTATE CANCER (H): Primary | ICD-10-CM

## 2023-06-28 ENCOUNTER — PATIENT OUTREACH (OUTPATIENT)
Dept: CARE COORDINATION | Facility: CLINIC | Age: 58
End: 2023-06-28
Payer: COMMERCIAL

## 2023-06-28 NOTE — PROGRESS NOTES
Oncology Distress Screening Follow-up  Clinical Social Work  [facility]    Identified Concern and Score From Distress Screenin. If you want to be contacted by one of our professionals, I can send a message to them right now.  Oncology Social Worker            Date of Distress Screenin23      Data:  Pt is 57 year old  male with a diagnosis of prostate cancer.  Pt is currently receiving hormone therapy to treat his cancer.  Pt is interested in cancer support resources.  At time of last visit, Patient scored positive on distress screen.  called Patient today with intention of introducing them to psychosocial services and support, and following up on elevated distress.        Intervention/Education Provided:  RANAJN talked with pt today and pt expressed desire for cancer support resources.  RANJAN discussed the options of [employer], [place], and [employer] resources with pt and he is interested in those resources.  RANJAN sent pt [employer] message with detailed infomation regarding these resources, with pt's permission.  Pt denied any other cancer related needs today.   RANJAN also provided pt with his regular  name and contact information for future questions/needs, Rach Gamboa, [phone]      Follow-up Required:    will remain available as needed.    Elier Uribe, Sergio WOODRUFF, for Rach Gamboa, RANJAN  [phone]

## 2023-06-29 DIAGNOSIS — C61 MALIGNANT NEOPLASM OF PROSTATE (H): Primary | ICD-10-CM

## 2023-07-11 ENCOUNTER — LAB (OUTPATIENT)
Dept: LAB | Facility: CLINIC | Age: 58
End: 2023-07-11
Payer: COMMERCIAL

## 2023-07-11 DIAGNOSIS — C61 MALIGNANT NEOPLASM OF PROSTATE (H): ICD-10-CM

## 2023-07-11 LAB
ALBUMIN SERPL BCG-MCNC: 5 G/DL (ref 3.5–5.2)
ALP SERPL-CCNC: 82 U/L (ref 40–129)
ALT SERPL W P-5'-P-CCNC: 89 U/L (ref 0–70)
ANION GAP SERPL CALCULATED.3IONS-SCNC: 11 MMOL/L (ref 7–15)
AST SERPL W P-5'-P-CCNC: 51 U/L (ref 0–45)
BILIRUB SERPL-MCNC: 0.5 MG/DL
BUN SERPL-MCNC: 16.2 MG/DL (ref 8–23)
CALCIUM SERPL-MCNC: 10.3 MG/DL (ref 8.6–10)
CHLORIDE SERPL-SCNC: 103 MMOL/L (ref 98–107)
CREAT SERPL-MCNC: 1.08 MG/DL (ref 0.67–1.17)
DEPRECATED HCO3 PLAS-SCNC: 26 MMOL/L (ref 22–29)
GFR SERPL CREATININE-BSD FRML MDRD: 79 ML/MIN/1.73M2
GLUCOSE SERPL-MCNC: 101 MG/DL (ref 70–99)
POTASSIUM SERPL-SCNC: 4.3 MMOL/L (ref 3.4–5.3)
PROT SERPL-MCNC: 7.9 G/DL (ref 6.4–8.3)
PSA SERPL DL<=0.01 NG/ML-MCNC: 0.15 NG/ML (ref 0–3.5)
SODIUM SERPL-SCNC: 140 MMOL/L (ref 136–145)

## 2023-07-11 PROCEDURE — 84403 ASSAY OF TOTAL TESTOSTERONE: CPT | Performed by: INTERNAL MEDICINE

## 2023-07-11 PROCEDURE — 80053 COMPREHEN METABOLIC PANEL: CPT | Performed by: PATHOLOGY

## 2023-07-11 PROCEDURE — 84153 ASSAY OF PSA TOTAL: CPT | Performed by: PATHOLOGY

## 2023-07-11 PROCEDURE — 99000 SPECIMEN HANDLING OFFICE-LAB: CPT | Performed by: PATHOLOGY

## 2023-07-11 PROCEDURE — 36415 COLL VENOUS BLD VENIPUNCTURE: CPT | Performed by: PATHOLOGY

## 2023-07-13 LAB — TESTOST SERPL-MCNC: 14 NG/DL (ref 240–950)

## 2023-07-17 DIAGNOSIS — C61 MALIGNANT NEOPLASM OF PROSTATE (H): Primary | ICD-10-CM

## 2023-07-17 PROCEDURE — 99207 PR NO BILLABLE SERVICE THIS VISIT: CPT | Performed by: INTERNAL MEDICINE

## 2023-08-14 ENCOUNTER — LAB (OUTPATIENT)
Dept: LAB | Facility: CLINIC | Age: 58
End: 2023-08-14
Payer: COMMERCIAL

## 2023-08-14 DIAGNOSIS — C61 MALIGNANT NEOPLASM OF PROSTATE (H): ICD-10-CM

## 2023-08-14 LAB — PSA SERPL DL<=0.01 NG/ML-MCNC: 0.09 NG/ML (ref 0–4.5)

## 2023-08-14 PROCEDURE — 36415 COLL VENOUS BLD VENIPUNCTURE: CPT | Performed by: PATHOLOGY

## 2023-08-14 PROCEDURE — 84403 ASSAY OF TOTAL TESTOSTERONE: CPT | Performed by: INTERNAL MEDICINE

## 2023-08-14 PROCEDURE — 99000 SPECIMEN HANDLING OFFICE-LAB: CPT | Performed by: PATHOLOGY

## 2023-08-14 PROCEDURE — 84153 ASSAY OF PSA TOTAL: CPT | Performed by: PATHOLOGY

## 2023-08-16 LAB — TESTOST SERPL-MCNC: 11 NG/DL (ref 240–950)

## 2023-09-18 ENCOUNTER — DOCUMENTATION ONLY (OUTPATIENT)
Dept: ONCOLOGY | Facility: CLINIC | Age: 58
End: 2023-09-18

## 2023-09-18 DIAGNOSIS — C61 MALIGNANT NEOPLASM OF PROSTATE (H): Primary | ICD-10-CM

## 2023-09-18 NOTE — PROGRESS NOTES
Hello,   In order to offer our patients the best possible experience, the lab schedule is reviewed to ensure patients have lab orders in Epic prior to arriving at the lab.    Please have one of your team members place a lab order in Epic for this patient prior to the lab appointment date.     Thank you for your attention,   [facility] [phone]

## 2023-09-20 ENCOUNTER — LAB (OUTPATIENT)
Dept: LAB | Facility: CLINIC | Age: 58
End: 2023-09-20
Payer: COMMERCIAL

## 2023-09-20 DIAGNOSIS — C61 MALIGNANT NEOPLASM OF PROSTATE (H): ICD-10-CM

## 2023-09-20 LAB — PSA SERPL DL<=0.01 NG/ML-MCNC: 0.05 NG/ML (ref 0–4.5)

## 2023-09-20 PROCEDURE — 84153 ASSAY OF PSA TOTAL: CPT | Performed by: PATHOLOGY

## 2023-09-20 PROCEDURE — 84403 ASSAY OF TOTAL TESTOSTERONE: CPT | Performed by: INTERNAL MEDICINE

## 2023-09-20 PROCEDURE — 36415 COLL VENOUS BLD VENIPUNCTURE: CPT | Performed by: PATHOLOGY

## 2023-09-20 PROCEDURE — 99000 SPECIMEN HANDLING OFFICE-LAB: CPT | Performed by: PATHOLOGY

## 2023-09-22 LAB — TESTOST SERPL-MCNC: 10 NG/DL (ref 240–950)

## 2023-09-25 ENCOUNTER — VIRTUAL VISIT (OUTPATIENT)
Dept: ONCOLOGY | Facility: CLINIC | Age: 58
End: 2023-09-25
Attending: INTERNAL MEDICINE
Payer: COMMERCIAL

## 2023-09-25 VITALS — WEIGHT: 215 LBS | BODY MASS INDEX: 30.1 KG/M2 | HEIGHT: 71 IN

## 2023-09-25 DIAGNOSIS — C61 MALIGNANT NEOPLASM OF PROSTATE (H): Primary | ICD-10-CM

## 2023-09-25 PROCEDURE — 99215 OFFICE O/P EST HI 40 MIN: CPT | Mod: VID | Performed by: INTERNAL MEDICINE

## 2023-09-25 ASSESSMENT — PAIN SCALES - GENERAL: PAINLEVEL: SEVERE PAIN (7)

## 2023-09-25 NOTE — PROGRESS NOTES
Virtual Visit Details    Type of service:  Video visit     Originating Location (pt. Location):  Home    Distant Location (provider location):  [facility]  Platform used for Video Visit:  Kerwin      -----------------------------------------------------------------------------------------------------------------------------------------------------    FOLLOW UP  -  TELEMEDICINE VISIT     Reason for Visit:  Biochemically-recurrent prostate cancer with PSMA-positive peritoneal deposits, on relugolix.     History of Present Illness:  Mr. Gao is a 58-year old man who was diagnosed with prostate cancer in 10/2010, at age 45.  His PSA level at that time was 3.7 ng/mL.  He underwent a radical prostatectomy on 12/6/2010, which revealed prostatic acinar adenocarcinoma, [place] 4+4=8, with organ-confined disease, no extraprostatic extension or seminal vesicle invasion, 0/16 positive lymph nodes, but he did have a positive apical surgical margin.  His final pathological stage was pT2b N0 R1.  [facility] NGS analysis revealed a TMPRSS2-ETV5 fusion and a PTEN deletion, TMB 3 muts/Mb, and gLOH 21% (high).     Following surgery, he developed a biochemical recurrence with a PSA level reaching 0.34 ng/mL by 8/2013.  He was then treated with salvage external-beam radiotherapy using 7020 cGy over 39 fractions, ending in 12/2013.  He also received concurrent androgen deprivation therapy for 12 months, ending in 9/2014.  His PSA level then remained undetectable for approximately 6 years.  Unfortunately, in 3/2020, he developed a further biochemical recurrence.  His PSA on 3/3/2020 was 0.2, the PSA on 11/20/2020 was 0.3, the PSA on 4/23/2021 was 0.6, and the PSA on 2/17/2022 was 1.59 ng/mL.     The patient then underwent a PSMA-PET scan on 2/17/2022.  This showed a PSMA-avid right iliac lymph node metastasis, without other evidence of local recurrence or distant spread.  On 4/29/2022, he underwent surgical  metastasectomy with Dr Antoine without any additional ADT at that time.  As a result, his PSA level has dropped down to 0.32 ng/mL (9/9/2022).  The PSA on 11/10/22 was 0.48 ng/mL, and the PSA on 1/10/23 was 0.70 ng/mL.      By 5/1/2023, his PSA level had reached 1.21 ng/mL.  He underwent a PSMA-PET (5/30/23) which showed 6 small tracer-avid peritoneal metastases.  He began relugolix on 6/14/23, and has been on this agent for about 3 months.  His PSA has dropped down to 0.05 ng/mL.  We conducted the encounter by way of a video visit today, in order to discuss side effect management and future treatment plans.     Review of Systems:  The patient reports feeling generally well.  He does have erectile dysfunction, which has been a problem for him since his radical prostatectomy.  He has started to experience hot flashes since beginning relugolix.  He does not report any additional new signs or symptoms at this time.  He has not had any changes in his weight.  He does not have any cancer-related pain.  A comprehensive 14-system review of symptoms is otherwise generally within normal limits.  His ECOG score is 0.  His pain score is 0/10.     Medications:  Relugolix 120 mg - started on 6/14/2023  Rosuvastatin 10 mg  Bupropion 300 mg  Fluticasone nasal spray  Ibuprofen PRN     Physical Examination:  Mr. Gao is a 58-year-old man who appears comfortable at rest.  His vital signs are unremarkable.  His HEENT examination is within normal limits.  There is no palpable lymphadenopathy.  The cardiac, pulmonary, and gastrointestinal examinations are within normal limits.  The neuromuscular examination is intact.  The extremities do not demonstrate pitting edema.  The skin evaluation is unremarkable.     Surgical Pathology (4/29/22):  He underwent surgical metastasectomy of a right retroperitoneal lymph node on 4/29/22.  This showed metastatic prostatic adenocarcinoma (2.5 cm in greatest diameter) with extranodal extension.   This was sent for [employer] somatic DNA analysis, and showed a TMPRSS2-ETV5 fusion and a PTEN loss, TMB 3 muts/Mb, and gLOH 21% (high).      PSMA-PET scan (5/30/23):  This showed six small radiotracer-avid enhancing peritoneal deposits consistent with metastases. This pattern is more typical for peritoneal implants, and is atypical for prostate cancer, but may represent a true metastatic recurrence given the rising PSA level.    Laboratories:  His PSA level has dropped from 1.21 ng/mL (5/1/23) down to 0.05 ng/mL (9/20/23).        ASSESSMENT AND PLAN:  Mr. Gao is a 58-year-old man with a history of high-risk localized prostate cancer (pT2b N0 R1, [place] 4+4=8, PSA 3.7 ng/mL) who underwent radical prostatectomy followed by salvage radiotherapy.  He subsequently developed a biochemical recurrence with a PSA level of 1.59 ng/mL and a PSMA-PET scan showing a solitary right iliac amy metastasis which was surgically resected on 4/29/2022.  The PSA maddy to 1.21 ng/mL, and he started relugolix on 6/14/2023, with a favorable treatment response.  His ECOG score is 0.  His pain score is 0/10.     The patient previously developed a single metastatic recurrence involving a right iliac lymph node, without evidence of additional local or distant metastatic deposits.  On 4/29/2022, he underwent successful surgical metastasectomy which confirmed metastatic prostatic adenocarcinoma.  Following resection, his PSA level dropped from 1.59 ng/mL down to 0.32 ng/mL.  However, the current PSA is back up to 1.21 ng/mL.  Since his PSA level has risen above 1.0 ng/mL, we decided to obtain another PSMA-PET scan on 5/30/23.  Unfortunately, his the PSMA scan suggested peritoneal implants which are not common in prostate cancer, but these could certainly explain his rising PSA level.  I had presented him with several options.  First, he could begin another cycle (e.g. three to six months) of intermittent ADT, although the patient has been  generally reluctant to receive hormonal therapy.  Alternatively, we could manage him with observation alone and then repeat another PSMA-PET scan in 6 to 12 months, depending on the rate of PSA elevation moving forward.  In terms of radiotherapy, I will discuss this case with my colleagues but it is unlikely that further radiation would be indicated.      At the end of our prior conversations, we had decided to begin relugolix.  He began this treatment on 6/14/2023.  I had planned to treat him with 3 months of relugolix therapy, followed by a potential cessation of therapy if he achieves a complete PSA response.  This would potentially be followed by further cycles of intermittent ADT (using relugolix) in the future.  However, his PSA level still remains detectable at this time (9/20/2023).  Thus, I have recommended continuing with relugolix for a few more months.  The patient has agreed with this recommendation.  His next PSA will be on 10/19/2023; if it drops to undetectable then we will stop ADT at that time.     A total of 40 minutes were spent in consultation with the patient on the day of the encounter, of which more than 50% of this time was used for counseling and coordination of care.  The patient was allowed to ask multiple questions today, all of which were answered to his satisfaction.     Al Morocho M.D.

## 2023-09-25 NOTE — LETTER
9/25/2023         RE: Duglas Gao  [address] [address]        Dear Colleague,    Thank you for referring your patient, Duglas Gao, to the [facility]. Please see a copy of my visit note below.      Virtual Visit Details    Type of service:  Video visit     Originating Location (pt. Location):  Home    Distant Location (provider location):  [facility]  Platform used for Video Visit:  AmWell      -----------------------------------------------------------------------------------------------------------------------------------------------------    FOLLOW UP  -  TELEMEDICINE VISIT     Reason for Visit:  Biochemically-recurrent prostate cancer with PSMA-positive peritoneal deposits, on relugolix.     History of Present Illness:  Mr. Gao is a 58-year old man who was diagnosed with prostate cancer in 10/2010, at age 45.  His PSA level at that time was 3.7 ng/mL.  He underwent a radical prostatectomy on 12/6/2010, which revealed prostatic acinar adenocarcinoma, [place] 4+4=8, with organ-confined disease, no extraprostatic extension or seminal vesicle invasion, 0/16 positive lymph nodes, but he did have a positive apical surgical margin.  His final pathological stage was pT2b N0 R1.  [facility] NGS analysis revealed a TMPRSS2-ETV5 fusion and a PTEN deletion, TMB 3 muts/Mb, and gLOH 21% (high).     Following surgery, he developed a biochemical recurrence with a PSA level reaching 0.34 ng/mL by 8/2013.  He was then treated with salvage external-beam radiotherapy using 7020 cGy over 39 fractions, ending in 12/2013.  He also received concurrent androgen deprivation therapy for 12 months, ending in 9/2014.  His PSA level then remained undetectable for approximately 6 years.  Unfortunately, in 3/2020, he developed a further biochemical recurrence.  His PSA on 3/3/2020 was 0.2, the PSA on 11/20/2020 was 0.3, the PSA on 4/23/2021 was 0.6,  and the PSA on 2/17/2022 was 1.59 ng/mL.     The patient then underwent a PSMA-PET scan on 2/17/2022.  This showed a PSMA-avid right iliac lymph node metastasis, without other evidence of local recurrence or distant spread.  On 4/29/2022, he underwent surgical metastasectomy with Dr Antoine without any additional ADT at that time.  As a result, his PSA level has dropped down to 0.32 ng/mL (9/9/2022).  The PSA on 11/10/22 was 0.48 ng/mL, and the PSA on 1/10/23 was 0.70 ng/mL.      By 5/1/2023, his PSA level had reached 1.21 ng/mL.  He underwent a PSMA-PET (5/30/23) which showed 6 small tracer-avid peritoneal metastases.  He began relugolix on 6/14/23, and has been on this agent for about 3 months.  His PSA has dropped down to 0.05 ng/mL.  We conducted the encounter by way of a video visit today, in order to discuss side effect management and future treatment plans.     Review of Systems:  The patient reports feeling generally well.  He does have erectile dysfunction, which has been a problem for him since his radical prostatectomy.  He has started to experience hot flashes since beginning relugolix.  He does not report any additional new signs or symptoms at this time.  He has not had any changes in his weight.  He does not have any cancer-related pain.  A comprehensive 14-system review of symptoms is otherwise generally within normal limits.  His ECOG score is 0.  His pain score is 0/10.     Medications:  Relugolix 120 mg - started on 6/14/2023  Rosuvastatin 10 mg  Bupropion 300 mg  Fluticasone nasal spray  Ibuprofen PRN     Physical Examination:  Mr. Gao is a 58-year-old man who appears comfortable at rest.  His vital signs are unremarkable.  His HEENT examination is within normal limits.  There is no palpable lymphadenopathy.  The cardiac, pulmonary, and gastrointestinal examinations are within normal limits.  The neuromuscular examination is intact.  The extremities do not demonstrate pitting edema.  The skin  evaluation is unremarkable.     Surgical Pathology (4/29/22):  He underwent surgical metastasectomy of a right retroperitoneal lymph node on 4/29/22.  This showed metastatic prostatic adenocarcinoma (2.5 cm in greatest diameter) with extranodal extension.  This was sent for [employer] somatic DNA analysis, and showed a TMPRSS2-ETV5 fusion and a PTEN loss, TMB 3 muts/Mb, and gLOH 21% (high).      PSMA-PET scan (5/30/23):  This showed six small radiotracer-avid enhancing peritoneal deposits consistent with metastases. This pattern is more typical for peritoneal implants, and is atypical for prostate cancer, but may represent a true metastatic recurrence given the rising PSA level.    Laboratories:  His PSA level has dropped from 1.21 ng/mL (5/1/23) down to 0.05 ng/mL (9/20/23).        ASSESSMENT AND PLAN:  Mr. Gao is a 58-year-old man with a history of high-risk localized prostate cancer (pT2b N0 R1, [place] 4+4=8, PSA 3.7 ng/mL) who underwent radical prostatectomy followed by salvage radiotherapy.  He subsequently developed a biochemical recurrence with a PSA level of 1.59 ng/mL and a PSMA-PET scan showing a solitary right iliac amy metastasis which was surgically resected on 4/29/2022.  The PSA maddy to 1.21 ng/mL, and he started relugolix on 6/14/2023, with a favorable treatment response.  His ECOG score is 0.  His pain score is 0/10.     The patient previously developed a single metastatic recurrence involving a right iliac lymph node, without evidence of additional local or distant metastatic deposits.  On 4/29/2022, he underwent successful surgical metastasectomy which confirmed metastatic prostatic adenocarcinoma.  Following resection, his PSA level dropped from 1.59 ng/mL down to 0.32 ng/mL.  However, the current PSA is back up to 1.21 ng/mL.  Since his PSA level has risen above 1.0 ng/mL, we decided to obtain another PSMA-PET scan on 5/30/23.  Unfortunately, his the PSMA scan suggested peritoneal implants  which are not common in prostate cancer, but these could certainly explain his rising PSA level.  I had presented him with several options.  First, he could begin another cycle (e.g. three to six months) of intermittent ADT, although the patient has been generally reluctant to receive hormonal therapy.  Alternatively, we could manage him with observation alone and then repeat another PSMA-PET scan in 6 to 12 months, depending on the rate of PSA elevation moving forward.  In terms of radiotherapy, I will discuss this case with my colleagues but it is unlikely that further radiation would be indicated.      At the end of our prior conversations, we had decided to begin relugolix.  He began this treatment on 6/14/2023.  I had planned to treat him with 3 months of relugolix therapy, followed by a potential cessation of therapy if he achieves a complete PSA response.  This would potentially be followed by further cycles of intermittent ADT (using relugolix) in the future.  However, his PSA level still remains detectable at this time (9/20/2023).  Thus, I have recommended continuing with relugolix for a few more months.  The patient has agreed with this recommendation.  His next PSA will be on 10/19/2023; if it drops to undetectable then we will stop ADT at that time.     A total of 40 minutes were spent in consultation with the patient on the day of the encounter, of which more than 50% of this time was used for counseling and coordination of care.  The patient was allowed to ask multiple questions today, all of which were answered to his satisfaction.     Al Morocho M.D.

## 2023-09-25 NOTE — NURSING NOTE
Is the patient currently in the state of [place]? YES    Visit mode:VIDEO    If the visit is dropped, the patient can be reconnected by: VIDEO VISIT: Send to e-mail at: [email or web address]    Will anyone else be joining the visit? NO  (If patient encounters technical issues they should call [phone]    How would you like to obtain your AVS? MyChart    Are changes needed to the allergy or medication list? Pt declined allergy review and Pt declined med review    Reason for visit: YVONNE NIEVES

## 2023-10-19 ENCOUNTER — LAB (OUTPATIENT)
Dept: LAB | Facility: CLINIC | Age: 58
End: 2023-10-19
Payer: COMMERCIAL

## 2023-10-19 DIAGNOSIS — C61 MALIGNANT NEOPLASM OF PROSTATE (H): ICD-10-CM

## 2023-10-19 LAB — PSA SERPL DL<=0.01 NG/ML-MCNC: 0.06 NG/ML (ref 0–4.5)

## 2023-10-19 PROCEDURE — 84153 ASSAY OF PSA TOTAL: CPT | Performed by: PATHOLOGY

## 2023-10-19 PROCEDURE — 99000 SPECIMEN HANDLING OFFICE-LAB: CPT | Performed by: PATHOLOGY

## 2023-10-19 PROCEDURE — 36415 COLL VENOUS BLD VENIPUNCTURE: CPT | Performed by: PATHOLOGY

## 2023-10-19 PROCEDURE — 84403 ASSAY OF TOTAL TESTOSTERONE: CPT | Performed by: INTERNAL MEDICINE

## 2023-10-21 NOTE — PROGRESS NOTES
Virtual Visit Details    Type of service:  Video Visit   Originating Location (pt. Location):  Home    Distant Location (provider location):  [facility]  Platform used for Video Visit:  Kerwin    -----------------------------------------------------------------------------------------------------------------------------------------------------    FOLLOW UP  -  TELEMEDICINE VISIT     Reason for Visit:  Biochemically-recurrent prostate cancer with PSMA-positive peritoneal deposits, on relugolix for the past 4 months.     History of Present Illness:  Mr. Gao is a 58-year old man who was diagnosed with prostate cancer in 10/2010, at age 45.  His PSA level at that time was 3.7 ng/mL.  He underwent a radical prostatectomy on 12/6/2010, which revealed prostatic acinar adenocarcinoma, [place] 4+4=8, with organ-confined disease, no extraprostatic extension or seminal vesicle invasion, 0/16 positive lymph nodes, but he did have a positive apical surgical margin.  His final pathological stage was pT2b N0 R1.  [facility] NGS analysis revealed a TMPRSS2-ETV5 fusion and a PTEN deletion, with TMB 3 muts/Mb, and gLOH 21% (high).     Following surgery, he developed a biochemical recurrence with a PSA level reaching 0.34 ng/mL by 8/2013.  He was then treated with salvage external-beam radiotherapy using 7020 cGy over 39 fractions, ending in 12/2013.  He also received concurrent androgen deprivation therapy for 12 months, ending in 9/2014.  His PSA level then remained undetectable for approximately 6 years.  Unfortunately, in 3/2020 he developed a further biochemical recurrence.  His PSA on 3/3/2020 was 0.2, the PSA on 11/20/2020 was 0.3, the PSA on 4/23/2021 was 0.6, and the PSA on 2/17/2022 was 1.59 ng/mL.  The patient then underwent a PSMA-PET scan on 2/17/2022.  This showed a PSMA-avid right iliac lymph node metastasis, without other evidence of local recurrence or distant spread.  On 4/29/2022, he  underwent surgical metastasectomy with Dr Antoine without any additional ADT at that time.  As a result, his PSA level has dropped down to 0.32 ng/mL (9/9/2022).  The PSA on 11/10/22 was 0.48 ng/mL, and the PSA on 1/10/23 was 0.70 ng/mL.      By 5/1/2023, his PSA level had again risen and reached 1.21 ng/mL.  He underwent a PSMA-PET (5/30/2023) which showed 6 small tracer-avid peritoneal metastases.  He began relugolix on 6/14/23, and has been on this agent for about 4 months.  His PSA has dropped down to 0.05 ng/mL, although it was 0.06 ng/mL today.  We conducted the encounter by way of a video visit today, in order to discuss side effect management and future treatment plans.     Review of Systems:  The patient reports feeling generally well.  He does have erectile dysfunction, which has been a problem for him since his radical prostatectomy.  He has started to experience hot flashes since beginning relugolix.  He does not report any additional new signs or symptoms at this time.  He has not had any changes in his weight.  He does not have any cancer-related pain.  A comprehensive 14-system review of symptoms is otherwise generally within normal limits.  His ECOG score is 0.  His pain score is 0/10.     Medications:  Relugolix 120 mg - started on 6/14/2023  Rosuvastatin 10 mg  Bupropion 300 mg  Fluticasone nasal spray  Ibuprofen PRN     Physical Examination:  Mr. Gao is a 58-year-old man who appears comfortable at rest.  His vital signs are unremarkable.  His HEENT examination is within normal limits.  There is no palpable lymphadenopathy.  The cardiac, pulmonary, and gastrointestinal examinations are within normal limits.  The neuromuscular examination is intact.  The extremities do not demonstrate pitting edema.  The skin evaluation is unremarkable.     Surgical Pathology (4/29/2022):  He underwent surgical metastasectomy of a right retroperitoneal lymph node on 4/29/22.  This showed metastatic prostatic  adenocarcinoma (2.5 cm in greatest diameter) with extranodal extension.  This was sent for [employer] somatic DNA analysis, and showed a TMPRSS2-ETV5 fusion and a PTEN loss, with TMB 3 muts/Mb, and gLOH 21% (high).      PSMA-PET scan (5/30/2023):  This showed six small radiotracer-avid enhancing peritoneal deposits consistent with metastases. There were no amy or osseous metastases.    Laboratories:  His PSA level has dropped from 1.21 ng/mL (5/1/23) down to 0.05 ng/mL (9/20/23).  On 10/19/2023, the PSA was 0.06 ng/mL.        ASSESSMENT AND PLAN:  Mr. Gao is a 58-year-old man with a history of high-risk localized prostate cancer (pT2b N0 R1, [place] 4+4=8, PSA 3.7 ng/mL) who underwent radical prostatectomy followed by salvage radiotherapy.  He subsequently developed a biochemical recurrence with a PSA level of 1.59 ng/mL and a PSMA-PET scan showing a solitary right iliac amy metastasis which was surgically resected on 4/29/2022.  The PSA maddy to 1.21 ng/mL, and he started relugolix four months ago on 6/14/2023, with a favorable treatment response.  His ECOG score is 0.  His pain score is 0/10.     The patient previously developed a single metastatic recurrence involving a right iliac lymph node, without evidence of additional local or distant metastatic deposits.  On 4/29/2022, he underwent successful surgical metastasectomy which confirmed metastatic prostatic adenocarcinoma.  Following resection, his PSA level dropped from 1.59 ng/mL down to 0.32 ng/mL.  However, the current PSA is back up to 1.21 ng/mL.  Since his PSA level has risen above 1.0 ng/mL, we decided to obtain another PSMA-PET scan on 5/30/23.  Unfortunately, his the PSMA scan suggested peritoneal implants which are not common in prostate cancer, but these could certainly explain his rising PSA level.  I had presented him with several options.  First, he could begin another cycle (e.g. three to six months) of intermittent ADT, although the patient  has been generally reluctant to receive hormonal therapy.  Alternatively, we could manage him with observation alone and then repeat another PSMA-PET scan in 6 to 12 months, depending on the rate of PSA elevation moving forward.  In terms of radiotherapy, I will discuss this case with my colleagues but it is unlikely that further radiation would be indicated.      At the end of our prior conversations, we had decided to begin relugolix.  He began this treatment four months ago on 6/14/2023.  I had planned to treat him with 3-6 months of relugolix therapy, followed by a potential cessation of therapy if he achieves a complete PSA response.  This would potentially be followed by further cycles of intermittent ADT (using relugolix) in the future.  However, his PSA level still remains detectable and is around 0.05-0.06 ng/mL.  Nevertheless, I have recommended stopping relugolix after 5 months of treatment.  Moving forward, he will undergo PSA/testosterone testing every 2 months.  If his PSA level rises above 1.0 ng/mL in the future, then he may start another round of intermittent relugolix at that time and may potentially get another PSMA-PET scan as well.  His next bloodwork will be in December 2023.     A total of 40 minutes were spent in consultation with the patient on the day of the encounter, of which more than 50% of this time was used for counseling and coordination of care.  The patient was given the opportunity to ask multiple questions today, all of which were answered to his satisfaction.     Al Morocho M.D.

## 2023-10-23 ENCOUNTER — VIRTUAL VISIT (OUTPATIENT)
Dept: ONCOLOGY | Facility: CLINIC | Age: 58
End: 2023-10-23
Attending: INTERNAL MEDICINE
Payer: COMMERCIAL

## 2023-10-23 VITALS
SYSTOLIC BLOOD PRESSURE: 154 MMHG | HEIGHT: 71 IN | BODY MASS INDEX: 30.8 KG/M2 | HEART RATE: 80 BPM | WEIGHT: 220 LBS | DIASTOLIC BLOOD PRESSURE: 103 MMHG

## 2023-10-23 DIAGNOSIS — C61 MALIGNANT NEOPLASM OF PROSTATE (H): Primary | ICD-10-CM

## 2023-10-23 LAB — TESTOST SERPL-MCNC: 13 NG/DL (ref 240–950)

## 2023-10-23 PROCEDURE — 99215 OFFICE O/P EST HI 40 MIN: CPT | Mod: VID | Performed by: INTERNAL MEDICINE

## 2023-10-23 ASSESSMENT — PAIN SCALES - GENERAL: PAINLEVEL: NO PAIN (0)

## 2023-10-23 NOTE — NURSING NOTE
Is the patient currently in the state of [place]? YES    Visit mode:VIDEO    If the visit is dropped, the patient can be reconnected by: VIDEO VISIT: Send to e-mail at: [email or web address]    Will anyone else be joining the visit? NO  (If patient encounters technical issues they should call [phone])    How would you like to obtain your AVS? MyChart    Are changes needed to the allergy or medication list? Pt stated no changes to allergies and Pt stated no med changes    Reason for visit: RECHECK    Shane NIEVES

## 2023-10-23 NOTE — LETTER
10/23/2023         RE: Duglas Gao  [address] [place] [address]        Dear Colleague,    Thank you for referring your patient, Duglas Gao, to the [facility]. Please see a copy of my visit note below.      Virtual Visit Details    Type of service:  Video Visit   Originating Location (pt. Location):  Home    Distant Location (provider location):  [facility]  Platform used for Video Visit:  AmWell    -----------------------------------------------------------------------------------------------------------------------------------------------------    FOLLOW UP  -  TELEMEDICINE VISIT     Reason for Visit:  Biochemically-recurrent prostate cancer with PSMA-positive peritoneal deposits, on relugolix for the past 4 months.     History of Present Illness:  Mr. Gao is a 58-year old man who was diagnosed with prostate cancer in 10/2010, at age 45.  His PSA level at that time was 3.7 ng/mL.  He underwent a radical prostatectomy on 12/6/2010, which revealed prostatic acinar adenocarcinoma, [employer] [employer] 4+4=8, with organ-confined disease, no extraprostatic extension or seminal vesicle invasion, 0/16 positive lymph nodes, but he did have a positive apical surgical margin.  His final pathological stage was pT2b N0 R1.  [facility] NGS analysis revealed a TMPRSS2-ETV5 fusion and a PTEN deletion, with TMB 3 muts/Mb, and gLOH 21% (high).     Following surgery, he developed a biochemical recurrence with a PSA level reaching 0.34 ng/mL by 8/2013.  He was then treated with salvage external-beam radiotherapy using 7020 cGy over 39 fractions, ending in 12/2013.  He also received concurrent androgen deprivation therapy for 12 months, ending in 9/2014.  His PSA level then remained undetectable for approximately 6 years.  Unfortunately, in 3/2020 he developed a further biochemical recurrence.  His PSA on 3/3/2020 was 0.2, the PSA on 11/20/2020 was 0.3, the PSA  on 4/23/2021 was 0.6, and the PSA on 2/17/2022 was 1.59 ng/mL.  The patient then underwent a PSMA-PET scan on 2/17/2022.  This showed a PSMA-avid right iliac lymph node metastasis, without other evidence of local recurrence or distant spread.  On 4/29/2022, he underwent surgical metastasectomy with Dr Antoine without any additional ADT at that time.  As a result, his PSA level has dropped down to 0.32 ng/mL (9/9/2022).  The PSA on 11/10/22 was 0.48 ng/mL, and the PSA on 1/10/23 was 0.70 ng/mL.      By 5/1/2023, his PSA level had again risen and reached 1.21 ng/mL.  He underwent a PSMA-PET (5/30/2023) which showed 6 small tracer-avid peritoneal metastases.  He began relugolix on 6/14/23, and has been on this agent for about 4 months.  His PSA has dropped down to 0.05 ng/mL, although it was 0.06 ng/mL today.  We conducted the encounter by way of a video visit today, in order to discuss side effect management and future treatment plans.     Review of Systems:  The patient reports feeling generally well.  He does have erectile dysfunction, which has been a problem for him since his radical prostatectomy.  He has started to experience hot flashes since beginning relugolix.  He does not report any additional new signs or symptoms at this time.  He has not had any changes in his weight.  He does not have any cancer-related pain.  A comprehensive 14-system review of symptoms is otherwise generally within normal limits.  His ECOG score is 0.  His pain score is 0/10.     Medications:  Relugolix 120 mg - started on 6/14/2023  Rosuvastatin 10 mg  Bupropion 300 mg  Fluticasone nasal spray  Ibuprofen PRN     Physical Examination:  Mr. Gao is a 58-year-old man who appears comfortable at rest.  His vital signs are unremarkable.  His HEENT examination is within normal limits.  There is no palpable lymphadenopathy.  The cardiac, pulmonary, and gastrointestinal examinations are within normal limits.  The neuromuscular examination  is intact.  The extremities do not demonstrate pitting edema.  The skin evaluation is unremarkable.     Surgical Pathology (4/29/2022):  He underwent surgical metastasectomy of a right retroperitoneal lymph node on 4/29/22.  This showed metastatic prostatic adenocarcinoma (2.5 cm in greatest diameter) with extranodal extension.  This was sent for [employer] somatic DNA analysis, and showed a TMPRSS2-ETV5 fusion and a PTEN loss, with TMB 3 muts/Mb, and gLOH 21% (high).      PSMA-PET scan (5/30/2023):  This showed six small radiotracer-avid enhancing peritoneal deposits consistent with metastases. There were no amy or osseous metastases.    Laboratories:  His PSA level has dropped from 1.21 ng/mL (5/1/23) down to 0.05 ng/mL (9/20/23).  On 10/19/2023, the PSA was 0.06 ng/mL.        ASSESSMENT AND PLAN:  Mr. Gao is a 58-year-old man with a history of high-risk localized prostate cancer (pT2b N0 R1, [place] 4+4=8, PSA 3.7 ng/mL) who underwent radical prostatectomy followed by salvage radiotherapy.  He subsequently developed a biochemical recurrence with a PSA level of 1.59 ng/mL and a PSMA-PET scan showing a solitary right iliac amy metastasis which was surgically resected on 4/29/2022.  The PSA maddy to 1.21 ng/mL, and he started relugolix four months ago on 6/14/2023, with a favorable treatment response.  His ECOG score is 0.  His pain score is 0/10.     The patient previously developed a single metastatic recurrence involving a right iliac lymph node, without evidence of additional local or distant metastatic deposits.  On 4/29/2022, he underwent successful surgical metastasectomy which confirmed metastatic prostatic adenocarcinoma.  Following resection, his PSA level dropped from 1.59 ng/mL down to 0.32 ng/mL.  However, the current PSA is back up to 1.21 ng/mL.  Since his PSA level has risen above 1.0 ng/mL, we decided to obtain another PSMA-PET scan on 5/30/23.  Unfortunately, his the PSMA scan suggested  peritoneal implants which are not common in prostate cancer, but these could certainly explain his rising PSA level.  I had presented him with several options.  First, he could begin another cycle (e.g. three to six months) of intermittent ADT, although the patient has been generally reluctant to receive hormonal therapy.  Alternatively, we could manage him with observation alone and then repeat another PSMA-PET scan in 6 to 12 months, depending on the rate of PSA elevation moving forward.  In terms of radiotherapy, I will discuss this case with my colleagues but it is unlikely that further radiation would be indicated.      At the end of our prior conversations, we had decided to begin relugolix.  He began this treatment four months ago on 6/14/2023.  I had planned to treat him with 3-6 months of relugolix therapy, followed by a potential cessation of therapy if he achieves a complete PSA response.  This would potentially be followed by further cycles of intermittent ADT (using relugolix) in the future.  However, his PSA level still remains detectable and is around 0.05-0.06 ng/mL.  Nevertheless, I have recommended stopping relugolix after 5 months of treatment.  Moving forward, he will undergo PSA/testosterone testing every 2 months.  If his PSA level rises above 1.0 ng/mL in the future, then he may start another round of intermittent relugolix at that time and may potentially get another PSMA-PET scan as well.  His next bloodwork will be in December 2023.     A total of 40 minutes were spent in consultation with the patient on the day of the encounter, of which more than 50% of this time was used for counseling and coordination of care.  The patient was given the opportunity to ask multiple questions today, all of which were answered to his satisfaction.     Al Morocho M.D.

## 2023-12-18 ENCOUNTER — LAB (OUTPATIENT)
Dept: LAB | Facility: CLINIC | Age: 58
End: 2023-12-18
Payer: COMMERCIAL

## 2023-12-18 DIAGNOSIS — C61 MALIGNANT NEOPLASM OF PROSTATE (H): ICD-10-CM

## 2023-12-18 LAB — PSA SERPL DL<=0.01 NG/ML-MCNC: 0.79 NG/ML (ref 0–4.5)

## 2023-12-18 PROCEDURE — 84403 ASSAY OF TOTAL TESTOSTERONE: CPT | Performed by: INTERNAL MEDICINE

## 2023-12-18 PROCEDURE — 84153 ASSAY OF PSA TOTAL: CPT | Performed by: PATHOLOGY

## 2023-12-18 PROCEDURE — 36415 COLL VENOUS BLD VENIPUNCTURE: CPT | Performed by: PATHOLOGY

## 2023-12-18 PROCEDURE — 99000 SPECIMEN HANDLING OFFICE-LAB: CPT | Performed by: PATHOLOGY

## 2023-12-19 NOTE — PROGRESS NOTES
Virtual Visit Details    Type of service:  Video Visit   Originating Location (pt. Location):  Home    Distant Location (provider location):  [facility]  Platform used for Video Visit:  [employer]       -----------------------------------------------------------------------------------------------------------------------------------------------------     FOLLOW UP  -  TELEMEDICINE VISIT     Reason for Visit:  Biochemically-recurrent prostate cancer with PSMA-positive peritoneal deposits, on intermittent relugolix (currently off).     History of Present Illness:  Mr. Gao is a 58-year old man who was diagnosed with prostate cancer in 10/2010, at age 45.  His PSA level at that time was 3.7 ng/mL.  He underwent a radical prostatectomy on 12/6/2010, which revealed prostatic acinar adenocarcinoma, [place] 4+4=8, with organ-confined disease, no extraprostatic extension or seminal vesicle invasion, 0/16 positive lymph nodes, but he did have a positive apical surgical margin.  His final pathological stage was pT2b N0 R1.  [facility] NGS analysis revealed a TMPRSS2-ETV5 fusion and a PTEN deletion, with TMB 3 muts/Mb, and gLOH 21% (high).     Following surgery, he developed a biochemical recurrence with a PSA level reaching 0.34 ng/mL by 8/2013.  He was then treated with salvage external-beam radiotherapy using 7020 cGy over 39 fractions, ending in 12/2013.  He also received concurrent androgen deprivation therapy for 12 months, ending in 9/2014.  His PSA level then remained undetectable for approximately 6 years.  Unfortunately, in 3/2020 he developed a further biochemical recurrence.  His PSA on 3/3/2020 was 0.2, the PSA on 11/20/2020 was 0.3, the PSA on 4/23/2021 was 0.6, and the PSA on 2/17/2022 was 1.59 ng/mL.  The patient then underwent a PSMA-PET scan on 2/17/2022.  This showed a PSMA-avid right iliac lymph node metastasis, without other evidence of local recurrence or distant spread.  On  4/29/2022, he underwent surgical metastasectomy with Dr Antoine without any additional ADT at that time.  As a result, his PSA level has dropped down to 0.32 ng/mL (9/9/2022).  The PSA on 11/10/22 was 0.48 ng/mL, and the PSA on 1/10/23 was 0.70 ng/mL.       By 5/1/2023, his PSA level had again risen and reached 1.21 ng/mL.  He underwent a PSMA-PET (5/30/2023) which showed 6 small tracer-avid peritoneal metastases.  He began relugolix on 6/14/23, and received this agent for about 5 months.  His PSA has dropped down to 0.05 ng/mL in 9/2023.  He has been off ADT since that time, and his PSA level has increased back up to 0.79 ng/mL (12/18/23).  We conducted the encounter by way of a video visit today, in order to discuss side effect management and future treatment plans.     Review of Systems:  The patient reports feeling generally well.  He does have erectile dysfunction, which has been a problem for him since his radical prostatectomy.  He has started to experience hot flashes since beginning relugolix.  He does not report any additional new signs or symptoms at this time.  He has not had any changes in his weight.  He does not have any cancer-related pain.  A comprehensive 14-system review of symptoms is otherwise generally within normal limits.  His ECOG score is 0.  His pain score is 0/10.     Medications:  Relugolix 120 mg - stopped in 10/2023  Rosuvastatin 10 mg  Bupropion 300 mg  Fluticasone nasal spray  Ibuprofen PRN     Physical Examination:  Mr. Gao is a 58-year-old man who appears comfortable at rest.  His vital signs are unremarkable.  His HEENT examination is within normal limits.  There is no palpable lymphadenopathy.  The cardiac, pulmonary, and gastrointestinal examinations are within normal limits.  The neuromuscular examination is intact.  The extremities do not demonstrate pitting edema.  The skin evaluation is unremarkable.     Surgical Pathology (4/29/2022):  He underwent surgical  metastasectomy of a right retroperitoneal lymph node on 4/29/22.  This showed metastatic prostatic adenocarcinoma (2.5 cm in greatest diameter) with extranodal extension.  This was sent for [employer] somatic DNA analysis, and showed a TMPRSS2-ETV5 fusion and a PTEN loss, with TMB 3 muts/Mb, and gLOH 21% (high).      PSMA-PET scan (5/30/2023):  This showed six small radiotracer-avid enhancing peritoneal deposits consistent with metastases. There were no amy or osseous metastases.     Laboratories:  His PSA level has dropped from 1.21 ng/mL (5/1/23) down to 0.05 ng/mL (9/20/23).  On 10/19/2023, the PSA was 0.06 ng/mL.  On 10/19/2023, it had increased back up to 0.79 ng/mL.        ASSESSMENT AND PLAN:  Mr. Gao is a 58-year-old man with a history of high-risk localized prostate cancer (pT2b N0 R1, [place] 4+4=8, PSA 3.7 ng/mL) who underwent radical prostatectomy followed by salvage radiotherapy.  He subsequently developed a biochemical recurrence with a PSA level of 1.59 ng/mL and a PSMA-PET scan showing a solitary right iliac amy metastasis which was surgically resected on 4/29/2022.  The PSA maddy back up to 1.21 ng/mL, and he started relugolix on 6/14/2023, with a favorable treatment response after 5 months.  He is currently off ADT, and his PSA level has risen to 0.79 ng/mL.  His ECOG score is 0.  His pain score is 0/10.     The patient previously developed a single metastatic recurrence involving a right iliac lymph node, without evidence of additional local or distant metastatic deposits.  On 4/29/2022, he underwent successful surgical metastasectomy which confirmed metastatic prostatic adenocarcinoma.  Following resection, his PSA level dropped from 1.59 ng/mL down to 0.32 ng/mL.  However, the current PSA is back up to 1.21 ng/mL.  Since his PSA level has risen above 1.0 ng/mL, we decided to obtain another PSMA-PET scan on 5/30/23.  Unfortunately, his the PSMA scan suggested peritoneal implants which are not  common in prostate cancer, but these could certainly explain his rising PSA level.  I had presented him with several options at that time. At the end of our prior conversations, we had decided to begin relugolix.  He began this treatment four months ago on 6/14/2023.  I had planned to treat him with 3-6 months of relugolix therapy, followed by a potential cessation of therapy if he achieves a complete PSA response.  This would potentially be followed by further cycles of intermittent ADT (using relugolix) in the future.      At this time, his PSA level is back up to 0.79 ng/mL (10/19/2023) which has risen a bit faster than I anticipated.  Therefore, I suspect that he will require reinitiation of systemic therapy within the next one to two months.  This could either involve ADT alone, or the combination of ADT plus enzalutamide consistent with the recent published results of the EMBARK study and the subsequent FDA approvals.  In addition, he could consider enzalutamide monotherapy, as per the third arm of the EMBARK study.  In addition, the patient does have an interesting genomic profile with a high-genome wide PARISH score (gLOH 21%), and thus he may potentially benefit from treatment using a PARP inhibitory such as olaparib.  After reviewing the pros and cons of these various treatment options, the patient has decided to try olaparib 300 mg BID as a next step.  I will prescribe it today.     A total of 40 minutes were spent in consultation with the patient on the day of the encounter, of which more than 50% of this time was used for counseling and coordination of care.  The patient was given the opportunity to ask multiple questions today, all of which were answered to his satisfaction.     Al Morocho M.D.

## 2023-12-20 ENCOUNTER — VIRTUAL VISIT (OUTPATIENT)
Dept: ONCOLOGY | Facility: CLINIC | Age: 58
End: 2023-12-20
Attending: INTERNAL MEDICINE
Payer: COMMERCIAL

## 2023-12-20 VITALS
WEIGHT: 220 LBS | BODY MASS INDEX: 30.8 KG/M2 | HEIGHT: 71 IN | HEART RATE: 80 BPM | DIASTOLIC BLOOD PRESSURE: 87 MMHG | SYSTOLIC BLOOD PRESSURE: 151 MMHG

## 2023-12-20 DIAGNOSIS — C61 MALIGNANT NEOPLASM OF PROSTATE (H): Primary | ICD-10-CM

## 2023-12-20 DIAGNOSIS — Z79.899 ENCOUNTER FOR LONG-TERM (CURRENT) USE OF MEDICATIONS: ICD-10-CM

## 2023-12-20 DIAGNOSIS — C61 PROSTATE CANCER (H): Primary | ICD-10-CM

## 2023-12-20 DIAGNOSIS — C61 PROSTATE CANCER (H): ICD-10-CM

## 2023-12-20 PROCEDURE — 99215 OFFICE O/P EST HI 40 MIN: CPT | Mod: VID | Performed by: INTERNAL MEDICINE

## 2023-12-20 NOTE — NURSING NOTE
Is the patient currently in the state of [place]? YES    Visit mode:VIDEO    If the visit is dropped, the patient can be reconnected by: TELEPHONE VISIT: Phone number: [phone]    Will anyone else be joining the visit? Yes, possibly spouse Hugo will be there with.   (If patient encounters technical issues they should call [phone])    How would you like to obtain your AVS? MyChart    Are changes needed to the allergy or medication list? No    Reason for visit: RECHECK    Renee NIEVES

## 2023-12-20 NOTE — LETTER
12/20/2023         RE: Duglas Gao  [address] [place] [address]    Dear Colleague,    Thank you for referring your patient, Duglas Gao, to the [facility]. Please see a copy of my visit note below.      Virtual Visit Details    Type of service:  Video Visit   Originating Location (pt. Location):  Home    Distant Location (provider location):  [facility]  Platform used for Video Visit:  AmWell       -----------------------------------------------------------------------------------------------------------------------------------------------------     FOLLOW UP  -  TELEMEDICINE VISIT     Reason for Visit:  Biochemically-recurrent prostate cancer with PSMA-positive peritoneal deposits, on intermittent relugolix (currently off).     History of Present Illness:  Mr. Gao is a 58-year old man who was diagnosed with prostate cancer in 10/2010, at age 45.  His PSA level at that time was 3.7 ng/mL.  He underwent a radical prostatectomy on 12/6/2010, which revealed prostatic acinar adenocarcinoma, [place] 4+4=8, with organ-confined disease, no extraprostatic extension or seminal vesicle invasion, 0/16 positive lymph nodes, but he did have a positive apical surgical margin.  His final pathological stage was pT2b N0 R1.  [facility] NGS analysis revealed a TMPRSS2-ETV5 fusion and a PTEN deletion, with TMB 3 muts/Mb, and gLOH 21% (high).     Following surgery, he developed a biochemical recurrence with a PSA level reaching 0.34 ng/mL by 8/2013.  He was then treated with salvage external-beam radiotherapy using 7020 cGy over 39 fractions, ending in 12/2013.  He also received concurrent androgen deprivation therapy for 12 months, ending in 9/2014.  His PSA level then remained undetectable for approximately 6 years.  Unfortunately, in 3/2020 he developed a further biochemical recurrence.  His PSA on 3/3/2020 was 0.2, the PSA on 11/20/2020 was 0.3,  the PSA on 4/23/2021 was 0.6, and the PSA on 2/17/2022 was 1.59 ng/mL.  The patient then underwent a PSMA-PET scan on 2/17/2022.  This showed a PSMA-avid right iliac lymph node metastasis, without other evidence of local recurrence or distant spread.  On 4/29/2022, he underwent surgical metastasectomy with Dr Antoine without any additional ADT at that time.  As a result, his PSA level has dropped down to 0.32 ng/mL (9/9/2022).  The PSA on 11/10/22 was 0.48 ng/mL, and the PSA on 1/10/23 was 0.70 ng/mL.       By 5/1/2023, his PSA level had again risen and reached 1.21 ng/mL.  He underwent a PSMA-PET (5/30/2023) which showed 6 small tracer-avid peritoneal metastases.  He began relugolix on 6/14/23, and received this agent for about 5 months.  His PSA has dropped down to 0.05 ng/mL in 9/2023.  He has been off ADT since that time, and his PSA level has increased back up to 0.79 ng/mL (12/18/23).  We conducted the encounter by way of a video visit today, in order to discuss side effect management and future treatment plans.     Review of Systems:  The patient reports feeling generally well.  He does have erectile dysfunction, which has been a problem for him since his radical prostatectomy.  He has started to experience hot flashes since beginning relugolix.  He does not report any additional new signs or symptoms at this time.  He has not had any changes in his weight.  He does not have any cancer-related pain.  A comprehensive 14-system review of symptoms is otherwise generally within normal limits.  His ECOG score is 0.  His pain score is 0/10.     Medications:  Relugolix 120 mg - stopped in 10/2023  Rosuvastatin 10 mg  Bupropion 300 mg  Fluticasone nasal spray  Ibuprofen PRN     Physical Examination:  Mr. Gao is a 58-year-old man who appears comfortable at rest.  His vital signs are unremarkable.  His HEENT examination is within normal limits.  There is no palpable lymphadenopathy.  The cardiac, pulmonary, and  gastrointestinal examinations are within normal limits.  The neuromuscular examination is intact.  The extremities do not demonstrate pitting edema.  The skin evaluation is unremarkable.     Surgical Pathology (4/29/2022):  He underwent surgical metastasectomy of a right retroperitoneal lymph node on 4/29/22.  This showed metastatic prostatic adenocarcinoma (2.5 cm in greatest diameter) with extranodal extension.  This was sent for [employer] somatic DNA analysis, and showed a TMPRSS2-ETV5 fusion and a PTEN loss, with TMB 3 muts/Mb, and gLOH 21% (high).      PSMA-PET scan (5/30/2023):  This showed six small radiotracer-avid enhancing peritoneal deposits consistent with metastases. There were no amy or osseous metastases.     Laboratories:  His PSA level has dropped from 1.21 ng/mL (5/1/23) down to 0.05 ng/mL (9/20/23).  On 10/19/2023, the PSA was 0.06 ng/mL.  On 10/19/2023, it had increased back up to 0.79 ng/mL.      ASSESSMENT AND PLAN:  Mr. Gao is a 58-year-old man with a history of high-risk localized prostate cancer (pT2b N0 R1, [place] 4+4=8, PSA 3.7 ng/mL) who underwent radical prostatectomy followed by salvage radiotherapy.  He subsequently developed a biochemical recurrence with a PSA level of 1.59 ng/mL and a PSMA-PET scan showing a solitary right iliac amy metastasis which was surgically resected on 4/29/2022.  The PSA maddy back up to 1.21 ng/mL, and he started relugolix on 6/14/2023, with a favorable treatment response after 5 months.  He is currently off ADT, and his PSA level has risen to 0.79 ng/mL.  His ECOG score is 0.  His pain score is 0/10.     The patient previously developed a single metastatic recurrence involving a right iliac lymph node, without evidence of additional local or distant metastatic deposits.  On 4/29/2022, he underwent successful surgical metastasectomy which confirmed metastatic prostatic adenocarcinoma.  Following resection, his PSA level dropped from 1.59 ng/mL down to  0.32 ng/mL.  However, the current PSA is back up to 1.21 ng/mL.  Since his PSA level has risen above 1.0 ng/mL, we decided to obtain another PSMA-PET scan on 5/30/23.  Unfortunately, his the PSMA scan suggested peritoneal implants which are not common in prostate cancer, but these could certainly explain his rising PSA level.  I had presented him with several options at that time. At the end of our prior conversations, we had decided to begin relugolix.  He began this treatment four months ago on 6/14/2023.  I had planned to treat him with 3-6 months of relugolix therapy, followed by a potential cessation of therapy if he achieves a complete PSA response.  This would potentially be followed by further cycles of intermittent ADT (using relugolix) in the future.      At this time, his PSA level is back up to 0.79 ng/mL (10/19/2023) which has risen a bit faster than I anticipated.  Therefore, I suspect that he will require reinitiation of systemic therapy within the next one to two months.  This could either involve ADT alone, or the combination of ADT plus enzalutamide consistent with the recent published results of the EMBARK study and the subsequent FDA approvals.  In addition, he could consider enzalutamide monotherapy, as per the third arm of the EMBARK study.  In addition, the patient does have an interesting genomic profile with a high-genome wide PARISH score (gLOH 21%), and thus he may potentially benefit from treatment using a PARP inhibitory such as olaparib.  After reviewing the pros and cons of these various treatment options, the patient has decided to try olaparib 300 mg BID as a next step.  I will prescribe it today.     A total of 40 minutes were spent in consultation with the patient on the day of the encounter, of which more than 50% of this time was used for counseling and coordination of care.  The patient was given the opportunity to ask multiple questions today, all of which were answered to his  satisfaction.     Al Morocho M.D.

## 2023-12-21 LAB — TESTOST SERPL-MCNC: 223 NG/DL (ref 240–950)

## 2023-12-22 ENCOUNTER — MYC MEDICAL ADVICE (OUTPATIENT)
Dept: ONCOLOGY | Facility: CLINIC | Age: 58
End: 2023-12-22
Payer: COMMERCIAL

## 2023-12-22 ENCOUNTER — TELEPHONE (OUTPATIENT)
Dept: ONCOLOGY | Facility: CLINIC | Age: 58
End: 2023-12-22
Payer: COMMERCIAL

## 2023-12-22 NOTE — TELEPHONE ENCOUNTER
PRIOR AUTHORIZATION DENIED    Medication:    Insurance Company:    Denial Date:    Denial Reason(s): Off label  Appeal Information: Sent IB to MD to see if he has additional information  Patient Notified:         Mey Cortez CPhTOncology Pharmacy Liaison     [facility] [address] [address]  Office: [phone]  Fax: [phone]  [email or web address]

## 2024-01-04 ENCOUNTER — LAB (OUTPATIENT)
Dept: LAB | Facility: CLINIC | Age: 59
End: 2024-01-04
Attending: INTERNAL MEDICINE
Payer: COMMERCIAL

## 2024-01-04 DIAGNOSIS — C61 PROSTATE CANCER (H): ICD-10-CM

## 2024-01-04 DIAGNOSIS — Z79.899 ENCOUNTER FOR LONG-TERM (CURRENT) USE OF MEDICATIONS: ICD-10-CM

## 2024-01-04 DIAGNOSIS — C61 MALIGNANT NEOPLASM OF PROSTATE (H): ICD-10-CM

## 2024-01-04 LAB
ALBUMIN SERPL BCG-MCNC: 4.8 G/DL (ref 3.5–5.2)
ALP SERPL-CCNC: 88 U/L (ref 40–150)
ALT SERPL W P-5'-P-CCNC: 53 U/L (ref 0–70)
ANION GAP SERPL CALCULATED.3IONS-SCNC: 9 MMOL/L (ref 7–15)
AST SERPL W P-5'-P-CCNC: 35 U/L (ref 0–45)
BASOPHILS # BLD AUTO: 0.1 10E3/UL (ref 0–0.2)
BASOPHILS NFR BLD AUTO: 1 %
BILIRUB SERPL-MCNC: 0.5 MG/DL
BUN SERPL-MCNC: 10.4 MG/DL (ref 8–23)
CALCIUM SERPL-MCNC: 10.5 MG/DL (ref 8.8–10.2)
CHLORIDE SERPL-SCNC: 103 MMOL/L (ref 98–107)
CREAT SERPL-MCNC: 1.05 MG/DL (ref 0.67–1.17)
DEPRECATED HCO3 PLAS-SCNC: 28 MMOL/L (ref 22–29)
EGFRCR SERPLBLD CKD-EPI 2021: 81 ML/MIN/1.73M2
EOSINOPHIL # BLD AUTO: 0.5 10E3/UL (ref 0–0.7)
EOSINOPHIL NFR BLD AUTO: 8 %
ERYTHROCYTE [DISTWIDTH] IN BLOOD BY AUTOMATED COUNT: 12.7 % (ref 10–15)
GLUCOSE SERPL-MCNC: 93 MG/DL (ref 70–99)
HCT VFR BLD AUTO: 47.4 % (ref 40–53)
HGB BLD-MCNC: 16.1 G/DL (ref 13.3–17.7)
IMM GRANULOCYTES # BLD: 0.1 10E3/UL
IMM GRANULOCYTES NFR BLD: 1 %
LYMPHOCYTES # BLD AUTO: 1.9 10E3/UL (ref 0.8–5.3)
LYMPHOCYTES NFR BLD AUTO: 31 %
MCH RBC QN AUTO: 31 PG (ref 26.5–33)
MCHC RBC AUTO-ENTMCNC: 34 G/DL (ref 31.5–36.5)
MCV RBC AUTO: 91 FL (ref 78–100)
MONOCYTES # BLD AUTO: 0.4 10E3/UL (ref 0–1.3)
MONOCYTES NFR BLD AUTO: 7 %
NEUTROPHILS # BLD AUTO: 3.1 10E3/UL (ref 1.6–8.3)
NEUTROPHILS NFR BLD AUTO: 52 %
NRBC # BLD AUTO: 0 10E3/UL
NRBC BLD AUTO-RTO: 0 /100
PLATELET # BLD AUTO: 200 10E3/UL (ref 150–450)
POTASSIUM SERPL-SCNC: 4.2 MMOL/L (ref 3.4–5.3)
PROT SERPL-MCNC: 7.8 G/DL (ref 6.4–8.3)
PSA SERPL DL<=0.01 NG/ML-MCNC: 0.95 NG/ML (ref 0–4.5)
RBC # BLD AUTO: 5.2 10E6/UL (ref 4.4–5.9)
SODIUM SERPL-SCNC: 140 MMOL/L (ref 135–145)
WBC # BLD AUTO: 6.1 10E3/UL (ref 4–11)

## 2024-01-04 PROCEDURE — 85025 COMPLETE CBC W/AUTO DIFF WBC: CPT | Performed by: PATHOLOGY

## 2024-01-04 PROCEDURE — 84153 ASSAY OF PSA TOTAL: CPT | Performed by: PATHOLOGY

## 2024-01-04 PROCEDURE — 80053 COMPREHEN METABOLIC PANEL: CPT | Performed by: PATHOLOGY

## 2024-01-04 PROCEDURE — 36415 COLL VENOUS BLD VENIPUNCTURE: CPT | Performed by: PATHOLOGY

## 2024-01-08 ENCOUNTER — TELEPHONE (OUTPATIENT)
Dept: ONCOLOGY | Facility: CLINIC | Age: 59
End: 2024-01-08
Payer: COMMERCIAL

## 2024-01-08 DIAGNOSIS — C61 PROSTATE CANCER (H): Primary | ICD-10-CM

## 2024-01-08 NOTE — TELEPHONE ENCOUNTER
[facility]: Cancer Care                                                                                      RNCC received message from patient regarding the medication refill.  Called him back to inform him that the orgovix was sent to the [facility] pharmacy just under a couple of hours ago.  Advised patient he can always call back if there are issues, but the pharmacy is likely working on it now.  Patient states he will call the pharmacy.    Noris Sweet, RN, BSN  Oncology RN Care Coordinator  [facility]

## 2024-01-08 NOTE — TELEPHONE ENCOUNTER
PA Initiation    Medication: ORGOVYX 120 MG PO TABS  Insurance Company: [employer] - Phone [phone] Fax [phone]  Pharmacy Filling the Rx:    Filling Pharmacy Phone:    Filling Pharmacy Fax:    Start Date: 1/8/2024        Mey Cortez CPhTOncology Pharmacy [facility] [address] [address]  Office: [phone]  Fax: [phone]  [email or web address]

## 2024-01-09 NOTE — TELEPHONE ENCOUNTER
Prior Authorization Approval    Medication: ORGOVYX 120 MG PO TABS  Authorization Effective Date: 1/9/2024  Authorization Expiration Date: 1/8/2025  Approved Dose/Quantity:   Reference #:     Insurance Company: [employer] - Phone [phone] Fax [phone]  Expected CoPay: $ 25  CoPay Card Available: No    Financial Assistance Needed:   Which Pharmacy is filling the prescription: [facility] - [address]  Pharmacy Notified:   Patient Notified:         Mey Cortez CPhTOncology Pharmacy Liaison     [facility]  Clinics & Surgery [id number] [place] [address]  Office: [phone]  Fax: [phone]  [email or web address]

## 2024-02-08 DIAGNOSIS — C61 MALIGNANT NEOPLASM OF PROSTATE (H): Primary | ICD-10-CM

## 2024-03-04 ENCOUNTER — LAB (OUTPATIENT)
Dept: LAB | Facility: CLINIC | Age: 59
End: 2024-03-04
Payer: COMMERCIAL

## 2024-03-04 DIAGNOSIS — C61 MALIGNANT NEOPLASM OF PROSTATE (H): ICD-10-CM

## 2024-03-04 LAB
ALBUMIN SERPL BCG-MCNC: 4.6 G/DL (ref 3.5–5.2)
ALP SERPL-CCNC: 86 U/L (ref 40–150)
ALT SERPL W P-5'-P-CCNC: 56 U/L (ref 0–70)
ANION GAP SERPL CALCULATED.3IONS-SCNC: 11 MMOL/L (ref 7–15)
AST SERPL W P-5'-P-CCNC: 38 U/L (ref 0–45)
BASOPHILS # BLD AUTO: 0.1 10E3/UL (ref 0–0.2)
BASOPHILS NFR BLD AUTO: 1 %
BILIRUB SERPL-MCNC: 0.4 MG/DL
BUN SERPL-MCNC: 10 MG/DL (ref 8–23)
CALCIUM SERPL-MCNC: 10 MG/DL (ref 8.8–10.2)
CHLORIDE SERPL-SCNC: 102 MMOL/L (ref 98–107)
CREAT SERPL-MCNC: 1.03 MG/DL (ref 0.67–1.17)
DEPRECATED HCO3 PLAS-SCNC: 26 MMOL/L (ref 22–29)
EGFRCR SERPLBLD CKD-EPI 2021: 83 ML/MIN/1.73M2
EOSINOPHIL # BLD AUTO: 0.3 10E3/UL (ref 0–0.7)
EOSINOPHIL NFR BLD AUTO: 7 %
ERYTHROCYTE [DISTWIDTH] IN BLOOD BY AUTOMATED COUNT: 12.1 % (ref 10–15)
GLUCOSE SERPL-MCNC: 101 MG/DL (ref 70–99)
HCT VFR BLD AUTO: 43.1 % (ref 40–53)
HGB BLD-MCNC: 15.1 G/DL (ref 13.3–17.7)
IMM GRANULOCYTES # BLD: 0 10E3/UL
IMM GRANULOCYTES NFR BLD: 0 %
LYMPHOCYTES # BLD AUTO: 1.5 10E3/UL (ref 0.8–5.3)
LYMPHOCYTES NFR BLD AUTO: 31 %
MCH RBC QN AUTO: 31.2 PG (ref 26.5–33)
MCHC RBC AUTO-ENTMCNC: 35 G/DL (ref 31.5–36.5)
MCV RBC AUTO: 89 FL (ref 78–100)
MONOCYTES # BLD AUTO: 0.4 10E3/UL (ref 0–1.3)
MONOCYTES NFR BLD AUTO: 8 %
NEUTROPHILS # BLD AUTO: 2.6 10E3/UL (ref 1.6–8.3)
NEUTROPHILS NFR BLD AUTO: 53 %
NRBC # BLD AUTO: 0 10E3/UL
NRBC BLD AUTO-RTO: 0 /100
PLATELET # BLD AUTO: 184 10E3/UL (ref 150–450)
POTASSIUM SERPL-SCNC: 4.1 MMOL/L (ref 3.4–5.3)
PROT SERPL-MCNC: 7.5 G/DL (ref 6.4–8.3)
PSA SERPL DL<=0.01 NG/ML-MCNC: 0.1 NG/ML (ref 0–4.5)
RBC # BLD AUTO: 4.84 10E6/UL (ref 4.4–5.9)
SODIUM SERPL-SCNC: 139 MMOL/L (ref 135–145)
WBC # BLD AUTO: 4.8 10E3/UL (ref 4–11)

## 2024-03-04 PROCEDURE — 80053 COMPREHEN METABOLIC PANEL: CPT | Performed by: PATHOLOGY

## 2024-03-04 PROCEDURE — 36415 COLL VENOUS BLD VENIPUNCTURE: CPT | Performed by: PATHOLOGY

## 2024-03-04 PROCEDURE — 85025 COMPLETE CBC W/AUTO DIFF WBC: CPT | Performed by: PATHOLOGY

## 2024-03-04 PROCEDURE — 84403 ASSAY OF TOTAL TESTOSTERONE: CPT | Performed by: INTERNAL MEDICINE

## 2024-03-04 PROCEDURE — 84153 ASSAY OF PSA TOTAL: CPT | Performed by: PATHOLOGY

## 2024-03-04 PROCEDURE — 99000 SPECIMEN HANDLING OFFICE-LAB: CPT | Performed by: PATHOLOGY

## 2024-03-06 LAB — TESTOST SERPL-MCNC: 20 NG/DL (ref 240–950)

## 2024-03-08 NOTE — PROGRESS NOTES
Virtual Visit Details    Type of service:  Video Visit   Originating Location (pt. Location):  Home    Distant Location (provider location):  [facility]  Platform used for Video Visit:  Kerwin       -----------------------------------------------------------------------------------------------------------------------------------------------------     FOLLOW UP  -  TELEMEDICINE VISIT     Reason for Visit:  Biochemically-recurrent prostate cancer with PSMA-positive peritoneal deposits, on intermittent relugolix (currently on).     History of Present Illness:  Mr. Gao is a 58-year old man who was diagnosed with prostate cancer in 10/2010, at age 45.  His PSA level at that time was 3.7 ng/mL.  He underwent a radical prostatectomy on 12/6/2010, which revealed prostatic acinar adenocarcinoma, Cachorro 4+4=8, with organ-confined disease, no extraprostatic extension or seminal vesicle invasion, 0/16 positive lymph nodes, but he did have a positive apical surgical margin.  His final pathological stage was pT2b N0 R1.  [facility] NGS analysis revealed a TMPRSS2-ETV5 fusion and a PTEN deletion, with TMB 3 muts/Mb, and gLOH 21% (high).     Following surgery, he developed a biochemical recurrence with a PSA level reaching 0.34 ng/mL by 8/2013.  He was then treated with salvage external-beam radiotherapy using 7020 cGy over 39 fractions, ending in 12/2013.  He also received concurrent androgen deprivation therapy for 12 months, ending in 9/2014.  His PSA level then remained undetectable for approximately 6 years.  Unfortunately, in 3/2020 he developed a further biochemical recurrence.  His PSA on 3/3/2020 was 0.2, the PSA on 11/20/2020 was 0.3, the PSA on 4/23/2021 was 0.6, and the PSA on 2/17/2022 was 1.59 ng/mL.  The patient then underwent a PSMA-PET scan on 2/17/2022.  This showed a PSMA-avid right iliac lymph node metastasis, without other evidence of local recurrence or distant spread.  On  4/29/2022, he underwent surgical metastasectomy with Dr Antoine without any additional ADT at that time.  As a result, his PSA level has dropped down to 0.32 ng/mL (9/9/2022).  The PSA on 11/10/22 was 0.48 ng/mL, and the PSA on 1/10/23 was 0.70 ng/mL.       By 5/1/2023, his PSA level had again risen and reached 1.21 ng/mL.  He underwent a PSMA-PET (5/30/2023) which showed 6 small tracer-avid peritoneal metastases.  He began relugolix on 6/14/23, and received this agent for about 5 months.  His PSA has dropped down to 0.05 ng/mL in 9/2023.  He went off ADT for a time, and his PSA level has increased back up to 0.79 ng/mL (12/18/23) and then to 0.95 ng/mL (1/4/24).  He is now back on relugolix.  We conducted the encounter by way of a video visit today, in order to discuss side effect management and future treatment plans.     Review of Systems:  The patient reports feeling generally well.  He does have erectile dysfunction, which has been a problem for him since his radical prostatectomy.  He has started to experience hot flashes since beginning relugolix.  He does not report any additional new signs or symptoms at this time.  He has not had any changes in his weight.  He does not have any cancer-related pain.  A comprehensive 14-system review of symptoms is otherwise generally within normal limits.  His ECOG score is 0.  His pain score is 0/10.     Medications:  Relugolix 120 mg - restarted in 1/2024  Rosuvastatin 10 mg  Bupropion 300 mg  Fluticasone nasal spray  Ibuprofen PRN     Physical Examination:  Mr. Gao is a 58-year-old man who appears comfortable at rest.  His vital signs are unremarkable.  His HEENT examination is within normal limits.  There is no palpable lymphadenopathy.  The cardiac, pulmonary, and gastrointestinal examinations are within normal limits.  The neuromuscular examination is intact.  The extremities do not demonstrate pitting edema.  The skin evaluation is unremarkable.     Surgical  Pathology (4/29/2022):  He underwent surgical metastasectomy of a right retroperitoneal lymph node on 4/29/22.  This showed metastatic prostatic adenocarcinoma (2.5 cm in greatest diameter) with extranodal extension.  This was sent for [employer] somatic DNA analysis, and showed a TMPRSS2-ETV5 fusion and a PTEN loss, with TMB 3 muts/Mb, and gLOH 21% (high).      PSMA-PET scan (5/30/2023):  This showed six small radiotracer-avid enhancing peritoneal deposits consistent with metastases. There were no amy or osseous metastases.     Laboratories:  His PSA level has dropped from 1.21 ng/mL (5/1/23) down to 0.05 ng/mL (9/20/23). On 10/19/2023, the PSA was 0.06 ng/mL.  On 1/4/2023, it had increased back up to 0.95 ng/mL.  After restarting relugolix, the PSA is back down to 0.1 ng/mL (3/4/24).        ASSESSMENT AND PLAN:  Mr. Gao is a 58-year-old man with a history of high-risk localized prostate cancer (pT2b N0 R1, Cachorro 4+4=8, PSA 3.7 ng/mL) who underwent radical prostatectomy followed by salvage radiotherapy.  He subsequently developed a biochemical recurrence with a PSA level of 1.59 ng/mL and a PSMA-PET scan showing a solitary right iliac amy metastasis which was surgically resected on 4/29/2022.  The PSA maddy back up to 1.21 ng/mL, and he started relugolix on 6/14/2023, with a favorable treatment response after 5 months.  He is currently on ADT, and his PSA level has dropped to 0.1 ng/mL.  His ECOG score is 0.  His pain score is 0/10.     The patient previously developed a single metastatic recurrence involving a right iliac lymph node, without evidence of additional local or distant metastatic deposits.  On 4/29/2022, he underwent successful surgical metastasectomy which confirmed metastatic prostatic adenocarcinoma.  Following resection, his PSA level dropped from 1.59 ng/mL down to 0.32 ng/mL.  However, the current PSA is back up to 1.21 ng/mL.  Since his PSA level has risen above 1.0 ng/mL, we decided to  obtain another PSMA-PET scan on 5/30/23.  Unfortunately, his the PSMA scan suggested peritoneal implants which are not common in prostate cancer, but these could certainly explain his rising PSA level.  I had presented him with several options at that time. At the end of our prior conversations, we had decided to begin relugolix.  He began this treatment on 6/14/2023.  I had planned to treat him with 3-6 months of relugolix therapy, followed by a potential cessation of therapy if he achieves a complete PSA response.    After stopping ADT late last year, his PSA level maddy back up to 0.95 ng/mL by 1/4/2024.  At that time, I had discussed several options either involving ADT alone, or the combination of ADT plus enzalutamide consistent with the recent published results of the EMBARK study.  In addition, he could consider enzalutamide monotherapy, as per the third arm of the EMBARK study.  In addition, the patient did have an interesting genomic profile with a high-genome wide PARISH score (gLOH 21%), and thus he may potentially benefit from treatment using a PARP inhibitory such as olaparib.  However, the use of olaparib was declined.  Thus, he elected to go back on relugolix, which has resulted in another PSA decline (0.95 --> 0.10 ng/mL).  We will hold off on adding enzalutamide at this time.  Next PSA will be on 6/3/24, with a video visit on 6/10/24.     A total of 40 minutes were spent in consultation with the patient on the day of the encounter, of which more than 50% of this time was used for counseling and coordination of care.  The patient was given the opportunity to ask multiple questions today, all of which were answered to his satisfaction.     Al Morocho M.D.

## 2024-03-11 ENCOUNTER — VIRTUAL VISIT (OUTPATIENT)
Dept: ONCOLOGY | Facility: CLINIC | Age: 59
End: 2024-03-11
Attending: INTERNAL MEDICINE
Payer: COMMERCIAL

## 2024-03-11 VITALS
BODY MASS INDEX: 30.8 KG/M2 | SYSTOLIC BLOOD PRESSURE: 142 MMHG | WEIGHT: 220 LBS | HEIGHT: 71 IN | HEART RATE: 58 BPM | DIASTOLIC BLOOD PRESSURE: 87 MMHG

## 2024-03-11 DIAGNOSIS — C61 MALIGNANT NEOPLASM OF PROSTATE (H): Primary | ICD-10-CM

## 2024-03-11 PROCEDURE — 99215 OFFICE O/P EST HI 40 MIN: CPT | Mod: 95 | Performed by: INTERNAL MEDICINE

## 2024-03-11 ASSESSMENT — PAIN SCALES - GENERAL: PAINLEVEL: NO PAIN (0)

## 2024-03-11 NOTE — LETTER
3/11/2024         RE: Duglas Gao  [address] [address]        Dear Colleague,    Thank you for referring your patient, Duglas Gao, to the [facility]. Please see a copy of my visit note below.      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     History of Present Illness:  Mr. Gao is a 58-year old man who was diagnosed with prostate cancer in 10/2010, at age 45.  His PSA level at that time was 3.7 ng/mL.  He underwent a radical prostatectomy on 12/6/2010, which revealed prostatic acinar adenocarcinoma, Cachorro 4+4=8, with organ-confined disease, no extraprostatic extension or seminal vesicle invasion, 0/16 positive lymph nodes, but he did have a positive apical surgical margin.  His final pathological stage was pT2b N0 R1.  [facility] NGS analysis revealed a TMPRSS2-ETV5 fusion and a PTEN deletion, with TMB 3 muts/Mb, and gLOH 21% (high).     Following surgery, he developed a biochemical recurrence with a PSA level reaching 0.34 ng/mL by 8/2013.  He was then treated with salvage external-beam radiotherapy using 7020 cGy over 39 fractions, ending in 12/2013.  He also received concurrent androgen deprivation therapy for 12 months, ending in 9/2014.  His PSA level then remained undetectable for approximately 6 years.  Unfortunately, in 3/2020 he developed a further biochemical recurrence.  His PSA on 3/3/2020 was 0.2, the PSA on 11/20/2020 was 0.3, the PSA on 4/23/2021 was 0.6, and the PSA on 2/17/2022 was 1.59 ng/mL.  The patient then underwent a PSMA-PET scan on 2/17/2022.  This showed a PSMA-avid right iliac lymph node metastasis, without other evidence of local  recurrence or distant spread.  On 4/29/2022, he underwent surgical metastasectomy with Dr Antoine without any additional ADT at that time.  As a result, his PSA level has dropped down to 0.32 ng/mL (9/9/2022).  The PSA on 11/10/22 was 0.48 ng/mL, and the PSA on 1/10/23 was 0.70 ng/mL.       By 5/1/2023, his PSA level had again risen and reached 1.21 ng/mL.  He underwent a PSMA-PET (5/30/2023) which showed 6 small tracer-avid peritoneal metastases.  He began relugolix on 6/14/23, and received this agent for about 5 months.  His PSA has dropped down to 0.05 ng/mL in 9/2023.  He went off ADT for a time, and his PSA level has increased back up to 0.79 ng/mL (12/18/23) and then to 0.95 ng/mL (1/4/24).  He is now back on relugolix.  We conducted the encounter by way of a video visit today, in order to discuss side effect management and future treatment plans.     Review of Systems:  The patient reports feeling generally well.  He does have erectile dysfunction, which has been a problem for him since his radical prostatectomy.  He has started to experience hot flashes since beginning relugolix.  He does not report any additional new signs or symptoms at this time.  He has not had any changes in his weight.  He does not have any cancer-related pain.  A comprehensive 14-system review of symptoms is otherwise generally within normal limits.  His ECOG score is 0.  His pain score is 0/10.     Medications:  Relugolix 120 mg - restarted in 1/2024  Rosuvastatin 10 mg  Bupropion 300 mg  Fluticasone nasal spray  Ibuprofen PRN     Physical Examination:  Mr. Gao is a 58-year-old man who appears comfortable at rest.  His vital signs are unremarkable.  His HEENT examination is within normal limits.  There is no palpable lymphadenopathy.  The cardiac, pulmonary, and gastrointestinal examinations are within normal limits.  The neuromuscular examination is intact.  The extremities do not demonstrate pitting edema.  The skin evaluation  is unremarkable.     Surgical Pathology (4/29/2022):  He underwent surgical metastasectomy of a right retroperitoneal lymph node on 4/29/22.  This showed metastatic prostatic adenocarcinoma (2.5 cm in greatest diameter) with extranodal extension.  This was sent for [employer] somatic DNA analysis, and showed a TMPRSS2-ETV5 fusion and a PTEN loss, with TMB 3 muts/Mb, and gLOH 21% (high).      PSMA-PET scan (5/30/2023):  This showed six small radiotracer-avid enhancing peritoneal deposits consistent with metastases. There were no amy or osseous metastases.     Laboratories:  His PSA level has dropped from 1.21 ng/mL (5/1/23) down to 0.05 ng/mL (9/20/23). On 10/19/2023, the PSA was 0.06 ng/mL.  On 1/4/2023, it had increased back up to 0.95 ng/mL.  After restarting relugolix, the PSA is back down to 0.1 ng/mL (3/4/24).        ASSESSMENT AND PLAN:  Mr. Gao is a 58-year-old man with a history of high-risk localized prostate cancer (pT2b N0 R1, Grant 4+4=8, PSA 3.7 ng/mL) who underwent radical prostatectomy followed by salvage radiotherapy.  He subsequently developed a biochemical recurrence with a PSA level of 1.59 ng/mL and a PSMA-PET scan showing a solitary right iliac amy metastasis which was surgically resected on 4/29/2022.  The PSA maddy back up to 1.21 ng/mL, and he started relugolix on 6/14/2023, with a favorable treatment response after 5 months.  He is currently on ADT, and his PSA level has dropped to 0.1 ng/mL.  His ECOG score is 0.  His pain score is 0/10.     The patient previously developed a single metastatic recurrence involving a right iliac lymph node, without evidence of additional local or distant metastatic deposits.  On 4/29/2022, he underwent successful surgical metastasectomy which confirmed metastatic prostatic adenocarcinoma.  Following resection, his PSA level dropped from 1.59 ng/mL down to 0.32 ng/mL.  However, the current PSA is back up to 1.21 ng/mL.  Since his PSA level has risen above  1.0 ng/mL, we decided to obtain another PSMA-PET scan on 5/30/23.  Unfortunately, his the PSMA scan suggested peritoneal implants which are not common in prostate cancer, but these could certainly explain his rising PSA level.  I had presented him with several options at that time. At the end of our prior conversations, we had decided to begin relugolix.  He began this treatment on 6/14/2023.  I had planned to treat him with 3-6 months of relugolix therapy, followed by a potential cessation of therapy if he achieves a complete PSA response.    After stopping ADT late last year, his PSA level maddy back up to 0.95 ng/mL by 1/4/2024.  At that time, I had discussed several options either involving ADT alone, or the combination of ADT plus enzalutamide consistent with the recent published results of the EMBARK study.  In addition, he could consider enzalutamide monotherapy, as per the third arm of the EMBARK study.  In addition, the patient did have an interesting genomic profile with a high-genome wide PARISH score (gLOH 21%), and thus he may potentially benefit from treatment using a PARP inhibitory such as olaparib.  However, the use of olaparib was declined.  Thus, he elected to go back on relugolix, which has resulted in another PSA decline (0.95 --> 0.10 ng/mL).  We will hold off on adding enzalutamide at this time.  Next PSA will be on 6/3/24, with a video visit on 6/10/24.     A total of 40 minutes were spent in consultation with the patient on the day of the encounter, of which more than 50% of this time was used for counseling and coordination of care.  The patient was given the opportunity to ask multiple questions today, all of which were answered to his satisfaction.     Al Morocho M.D.

## 2024-03-11 NOTE — NURSING NOTE
Is the patient currently in the state of [place]? YES    Visit mode:VIDEO    If the visit is dropped, the patient can be reconnected by: VIDEO VISIT: Send to e-mail at: [email or web address]    Will anyone else be joining the visit? NO  (If patient encounters technical issues they should call [phone]    How would you like to obtain your AVS? MyChart    Are changes needed to the allergy or medication list? No    Reason for visit: RECHECK    Diamond NIEVES

## 2024-03-16 ENCOUNTER — HEALTH MAINTENANCE LETTER (OUTPATIENT)
Age: 59
End: 2024-03-16

## 2024-06-03 ENCOUNTER — LAB (OUTPATIENT)
Dept: LAB | Facility: CLINIC | Age: 59
End: 2024-06-03
Payer: COMMERCIAL

## 2024-06-03 DIAGNOSIS — C61 MALIGNANT NEOPLASM OF PROSTATE (H): ICD-10-CM

## 2024-06-03 DIAGNOSIS — C61 PROSTATE CANCER (H): ICD-10-CM

## 2024-06-03 DIAGNOSIS — Z79.899 ENCOUNTER FOR LONG-TERM (CURRENT) USE OF MEDICATIONS: ICD-10-CM

## 2024-06-03 LAB
ALBUMIN SERPL BCG-MCNC: 4.6 G/DL (ref 3.5–5.2)
ALP SERPL-CCNC: 87 U/L (ref 40–150)
ALT SERPL W P-5'-P-CCNC: 61 U/L (ref 0–70)
ANION GAP SERPL CALCULATED.3IONS-SCNC: 8 MMOL/L (ref 7–15)
AST SERPL W P-5'-P-CCNC: 39 U/L (ref 0–45)
BASOPHILS # BLD AUTO: 0.1 10E3/UL (ref 0–0.2)
BASOPHILS NFR BLD AUTO: 1 %
BILIRUB SERPL-MCNC: 0.4 MG/DL
BUN SERPL-MCNC: 13 MG/DL (ref 8–23)
CALCIUM SERPL-MCNC: 9.8 MG/DL (ref 8.8–10.2)
CHLORIDE SERPL-SCNC: 105 MMOL/L (ref 98–107)
CREAT SERPL-MCNC: 1.19 MG/DL (ref 0.67–1.17)
DEPRECATED HCO3 PLAS-SCNC: 25 MMOL/L (ref 22–29)
EGFRCR SERPLBLD CKD-EPI 2021: 70 ML/MIN/1.73M2
EOSINOPHIL # BLD AUTO: 0.4 10E3/UL (ref 0–0.7)
EOSINOPHIL NFR BLD AUTO: 7 %
ERYTHROCYTE [DISTWIDTH] IN BLOOD BY AUTOMATED COUNT: 12.8 % (ref 10–15)
GLUCOSE SERPL-MCNC: 105 MG/DL (ref 70–99)
HCT VFR BLD AUTO: 42.5 % (ref 40–53)
HGB BLD-MCNC: 14.5 G/DL (ref 13.3–17.7)
IMM GRANULOCYTES # BLD: 0 10E3/UL
IMM GRANULOCYTES NFR BLD: 1 %
LYMPHOCYTES # BLD AUTO: 1.5 10E3/UL (ref 0.8–5.3)
LYMPHOCYTES NFR BLD AUTO: 30 %
MCH RBC QN AUTO: 30.9 PG (ref 26.5–33)
MCHC RBC AUTO-ENTMCNC: 34.1 G/DL (ref 31.5–36.5)
MCV RBC AUTO: 90 FL (ref 78–100)
MONOCYTES # BLD AUTO: 0.4 10E3/UL (ref 0–1.3)
MONOCYTES NFR BLD AUTO: 8 %
NEUTROPHILS # BLD AUTO: 2.5 10E3/UL (ref 1.6–8.3)
NEUTROPHILS NFR BLD AUTO: 53 %
NRBC # BLD AUTO: 0 10E3/UL
NRBC BLD AUTO-RTO: 0 /100
PLATELET # BLD AUTO: 170 10E3/UL (ref 150–450)
POTASSIUM SERPL-SCNC: 3.9 MMOL/L (ref 3.4–5.3)
PROT SERPL-MCNC: 7.3 G/DL (ref 6.4–8.3)
PSA SERPL DL<=0.01 NG/ML-MCNC: 0.06 NG/ML (ref 0–4.5)
RBC # BLD AUTO: 4.7 10E6/UL (ref 4.4–5.9)
SODIUM SERPL-SCNC: 138 MMOL/L (ref 135–145)
WBC # BLD AUTO: 4.8 10E3/UL (ref 4–11)

## 2024-06-03 PROCEDURE — 85025 COMPLETE CBC W/AUTO DIFF WBC: CPT | Performed by: PATHOLOGY

## 2024-06-03 PROCEDURE — 80053 COMPREHEN METABOLIC PANEL: CPT | Performed by: PATHOLOGY

## 2024-06-03 PROCEDURE — 36415 COLL VENOUS BLD VENIPUNCTURE: CPT | Performed by: PATHOLOGY

## 2024-06-03 PROCEDURE — 84403 ASSAY OF TOTAL TESTOSTERONE: CPT | Performed by: INTERNAL MEDICINE

## 2024-06-03 PROCEDURE — 99000 SPECIMEN HANDLING OFFICE-LAB: CPT | Performed by: PATHOLOGY

## 2024-06-03 PROCEDURE — 84153 ASSAY OF PSA TOTAL: CPT | Performed by: PATHOLOGY

## 2024-06-05 LAB — TESTOST SERPL-MCNC: 15 NG/DL (ref 240–950)

## 2024-06-09 NOTE — PROGRESS NOTES
Virtual Visit Details    Type of service:  Video Visit   Originating Location (pt. Location):  Home    Distant Location (provider location):  [facility]  Platform used for Video Visit:  Kerwin    -----------------------------------------------------------------------------------------------------------------------------------------------------     FOLLOW UP  -  TELEMEDICINE VISIT     Reason for Visit:  Biochemically-recurrent prostate cancer with PSMA-positive peritoneal deposits, on intermittent relugolix (currently on).     History of Present Illness:  Mr. Gao is a 58-year old man who was diagnosed with prostate cancer in 10/2010, at age 45.  His PSA level at that time was 3.7 ng/mL.  He underwent a radical prostatectomy on 12/6/2010, which revealed prostatic acinar adenocarcinoma, [place] 4+4=8, with organ-confined disease, no extraprostatic extension or seminal vesicle invasion, 0/16 positive lymph nodes, but he did have a positive apical surgical margin.  His final pathological stage was pT2b N0 R1.  [facility] NGS analysis revealed a TMPRSS2-ETV5 fusion and a PTEN deletion, with TMB 3 muts/Mb, and gLOH 21% (high).     Following surgery, he developed a biochemical recurrence with a PSA level reaching 0.34 ng/mL by 8/2013.  He was then treated with salvage external-beam radiotherapy using 7020 cGy over 39 fractions, ending in 12/2013.  He also received concurrent androgen deprivation therapy for 12 months, ending in 9/2014.  His PSA level then remained undetectable for approximately 6 years.  Unfortunately, in 3/2020 he developed a further biochemical recurrence.  His PSA on 3/3/2020 was 0.2, the PSA on 11/20/2020 was 0.3, the PSA on 4/23/2021 was 0.6, and the PSA on 2/17/2022 was 1.59 ng/mL.  The patient then underwent a PSMA-PET scan on 2/17/2022.  This showed a PSMA-avid right iliac lymph node metastasis, without other evidence of local recurrence or distant spread.  On  4/29/2022, he underwent surgical metastasectomy with Dr Antoine without any additional ADT at that time.  As a result, his PSA level has dropped down to 0.32 ng/mL (9/9/2022).  The PSA on 11/10/22 was 0.48 ng/mL, and the PSA on 1/10/23 was 0.70 ng/mL.       By 5/1/2023, his PSA level had again risen and reached 1.21 ng/mL.  He underwent a PSMA-PET (5/30/2023) which showed 6 small tracer-avid peritoneal metastases.  He began relugolix on 6/14/23, and received this agent for about 5 months.  His PSA has dropped down to 0.05 ng/mL in 9/2023.  He went off ADT for a time, and his PSA level has increased back up to 0.79 ng/mL (12/18/23) and then to 0.95 ng/mL (1/4/24).  He is now back on relugolix.  His current PSA level is 0.06 ng/mL.  We conducted the encounter by way of a video visit today, in order to discuss side effect management and future treatment plans.     Review of Systems:  The patient reports feeling generally well.  He did report some weight gain, tinnitus in the ears, and rising blood pressure.  He does have erectile dysfunction, which has been a problem for him since his radical prostatectomy.  He has started to experience hot flashes since beginning relugolix.  He does not report any additional new signs or symptoms at this time.  He has not had any changes in his weight.  He does not have any cancer-related pain.  A comprehensive 14-system review of symptoms is otherwise generally within normal limits.  His ECOG score is 0.  His pain score is 0/10.     Medications:  Relugolix 120 mg - restarted in 1/2024  Rosuvastatin 10 mg  Bupropion 300 mg  Fluticasone nasal spray  Ibuprofen PRN     Physical Examination:  Mr. Gao is a 58-year-old man who appears comfortable at rest.  His vital signs are unremarkable.  His HEENT examination is within normal limits.  There is no palpable lymphadenopathy.  The cardiac, pulmonary, and gastrointestinal examinations are within normal limits.  The neuromuscular  examination is intact.  The extremities do not demonstrate pitting edema.  The skin evaluation is unremarkable.     Surgical Pathology (4/29/2022):  He underwent surgical metastasectomy of a right retroperitoneal lymph node on 4/29/22.  This showed metastatic prostatic adenocarcinoma (2.5 cm in greatest diameter) with extranodal extension.  This was sent for [place] somatic DNA analysis, and showed a TMPRSS2-ETV5 fusion and a PTEN loss, with TMB 3 muts/Mb, and gLOH 21% (high).      PSMA-PET scan (5/30/2023):  This showed six small radiotracer-avid enhancing peritoneal deposits consistent with metastases. There were no amy or osseous metastases.     Laboratories:  His PSA level has dropped from 1.21 ng/mL (5/1/23) down to 0.05 ng/mL (9/20/23). On 10/19/2023, the PSA was 0.06 ng/mL.  On 1/4/2023, it had increased back up to 0.95 ng/mL.  After restarting relugolix, the PSA is back down to 0.06 ng/mL (6/3/24).        ASSESSMENT AND PLAN:  Mr. Gao is a 58-year-old man with a history of high-risk localized prostate cancer (pT2b N0 R1, [place] 4+4=8, PSA 3.7 ng/mL) who underwent radical prostatectomy followed by salvage radiotherapy.  He subsequently developed a biochemical recurrence with a PSA level of 1.59 ng/mL and a PSMA-PET scan showing a solitary right iliac amy metastasis which was surgically resected on 4/29/2022.  The PSA maddy back up to 1.21 ng/mL, and he started relugolix on 6/14/2023, with a favorable treatment response after 5 months.  He is currently on ADT, and his PSA level has dropped to 0.06 ng/mL.  His ECOG score is 0.  His pain score is 0/10.     The patient previously developed a single metastatic recurrence involving a right iliac lymph node, without evidence of additional local or distant metastatic deposits.  On 4/29/2022, he underwent successful surgical metastasectomy which confirmed metastatic prostatic adenocarcinoma.  Following resection, his PSA level dropped from 1.59 ng/mL down to  0.32 ng/mL.  However, the current PSA is back up to 1.21 ng/mL.  Since his PSA level has risen above 1.0 ng/mL, we decided to obtain another PSMA-PET scan on 5/30/23.  Unfortunately, his the PSMA scan suggested peritoneal implants which are not common in prostate cancer, but these could certainly explain his rising PSA level.  I had presented him with several options at that time. At the end of our prior conversations, we had decided to begin relugolix.  He began this treatment on 6/14/2023.  I had planned to treat him with 6 months of relugolix therapy, followed by a potential cessation of therapy if he achieves a complete PSA response.    After stopping ADT late last year, his PSA level maddy back up to 0.95 ng/mL by 1/4/2024.  At that time, I had discussed several options either involving ADT alone, or the combination of ADT plus enzalutamide consistent with the recent published results of the EMBARK study.  In addition, he could consider enzalutamide monotherapy, as per the third arm of the EMBARK study.  In addition, the patient did have an interesting genomic profile with a high-genome wide PARISH score (gLOH 21%), and thus he may potentially benefit from treatment using a PARP inhibitory such as olaparib.  However, the use of olaparib was declined by his insurance.  Thus, he elected to go back on relugolix in 1/2024, which has resulted in another PSA decline (0.95 --> 0.06 ng/mL).  He is tolerating the relugolix well, except for weight gain, tinnitus and hypertension.  We have discussed potentially considering semaglutide and/or an antihypertensive, and I have encouraged him to discuss this with his primary care physician.  We plan to treat him for a duration of one year (until 1/2025).  His next PSA will be in 9/2024, with a video visit shortly thereafter.     A total of 40 minutes were spent on this patient on the date of the encounter conducting chart review, review of test results, interpretation of tests,  patient visit, documentation and discussion with other provider(s). The patient was given the opportunity to ask multiple questions today, all of which were answered to his satisfaction.     Al Morocho M.D.

## 2024-06-10 ENCOUNTER — VIRTUAL VISIT (OUTPATIENT)
Dept: ONCOLOGY | Facility: CLINIC | Age: 59
End: 2024-06-10
Attending: INTERNAL MEDICINE
Payer: COMMERCIAL

## 2024-06-10 VITALS — HEIGHT: 71 IN | BODY MASS INDEX: 30.8 KG/M2 | WEIGHT: 220 LBS

## 2024-06-10 DIAGNOSIS — C61 MALIGNANT NEOPLASM OF PROSTATE (H): Primary | ICD-10-CM

## 2024-06-10 PROCEDURE — 99215 OFFICE O/P EST HI 40 MIN: CPT | Mod: 95 | Performed by: INTERNAL MEDICINE

## 2024-06-10 ASSESSMENT — PAIN SCALES - GENERAL: PAINLEVEL: NO PAIN (0)

## 2024-06-10 NOTE — NURSING NOTE
Is the patient currently in the state of [place]? YES    Visit mode:VIDEO    If the visit is dropped, the patient can be reconnected by: VIDEO VISIT: Send to e-mail at: [email or web address]    Will anyone else be joining the visit? NO  (If patient encounters technical issues they should call [phone]    How would you like to obtain your AVS? MyChart    Are changes needed to the allergy or medication list? No    Are refills needed on medications prescribed by this physician? NO    Reason for visit: YVONNE NIEVES

## 2024-06-10 NOTE — LETTER
6/10/2024      Duglas Gao  [address] [facility] [place] [address]      Dear Colleague,    Thank you for referring your patient, Duglas Gao, to the [facility]. Please see a copy of my visit note below.      Virtual Visit Details    Type of service:  Video Visit   Originating Location (pt. Location):  Home    Distant Location (provider location):  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     History of Present Illness:  Mr. Gao is a 58-year old man who was diagnosed with prostate cancer in 10/2010, at age 45.  His PSA level at that time was 3.7 ng/mL.  He underwent a radical prostatectomy on 12/6/2010, which revealed prostatic acinar adenocarcinoma, [place] 4+4=8, with organ-confined disease, no extraprostatic extension or seminal vesicle invasion, 0/16 positive lymph nodes, but he did have a positive apical surgical margin.  His final pathological stage was pT2b N0 R1.  [facility] NGS analysis revealed a TMPRSS2-ETV5 fusion and a PTEN deletion, with TMB 3 muts/Mb, and gLOH 21% (high).     Following surgery, he developed a biochemical recurrence with a PSA level reaching 0.34 ng/mL by 8/2013.  He was then treated with salvage external-beam radiotherapy using 7020 cGy over 39 fractions, ending in 12/2013.  He also received concurrent androgen deprivation therapy for 12 months, ending in 9/2014.  His PSA level then remained undetectable for approximately 6 years.  Unfortunately, in 3/2020 he developed a further biochemical recurrence.  His PSA on 3/3/2020 was 0.2, the PSA on 11/20/2020 was 0.3, the PSA on  4/23/2021 was 0.6, and the PSA on 2/17/2022 was 1.59 ng/mL.  The patient then underwent a PSMA-PET scan on 2/17/2022.  This showed a PSMA-avid right iliac lymph node metastasis, without other evidence of local recurrence or distant spread.  On 4/29/2022, he underwent surgical metastasectomy with Dr Antoine without any additional ADT at that time.  As a result, his PSA level has dropped down to 0.32 ng/mL (9/9/2022).  The PSA on 11/10/22 was 0.48 ng/mL, and the PSA on 1/10/23 was 0.70 ng/mL.       By 5/1/2023, his PSA level had again risen and reached 1.21 ng/mL.  He underwent a PSMA-PET (5/30/2023) which showed 6 small tracer-avid peritoneal metastases.  He began relugolix on 6/14/23, and received this agent for about 5 months.  His PSA has dropped down to 0.05 ng/mL in 9/2023.  He went off ADT for a time, and his PSA level has increased back up to 0.79 ng/mL (12/18/23) and then to 0.95 ng/mL (1/4/24).  He is now back on relugolix.  His current PSA level is 0.06 ng/mL.  We conducted the encounter by way of a video visit today, in order to discuss side effect management and future treatment plans.     Review of Systems:  The patient reports feeling generally well.  He did report some weight gain, tinnitus in the ears, and rising blood pressure.  He does have erectile dysfunction, which has been a problem for him since his radical prostatectomy.  He has started to experience hot flashes since beginning relugolix.  He does not report any additional new signs or symptoms at this time.  He has not had any changes in his weight.  He does not have any cancer-related pain.  A comprehensive 14-system review of symptoms is otherwise generally within normal limits.  His ECOG score is 0.  His pain score is 0/10.     Medications:  Relugolix 120 mg - restarted in 1/2024  Rosuvastatin 10 mg  Bupropion 300 mg  Fluticasone nasal spray  Ibuprofen PRN     Physical Examination:  Mr. Gao is a 58-year-old man who appears comfortable  at rest.  His vital signs are unremarkable.  His HEENT examination is within normal limits.  There is no palpable lymphadenopathy.  The cardiac, pulmonary, and gastrointestinal examinations are within normal limits.  The neuromuscular examination is intact.  The extremities do not demonstrate pitting edema.  The skin evaluation is unremarkable.     Surgical Pathology (4/29/2022):  He underwent surgical metastasectomy of a right retroperitoneal lymph node on 4/29/22.  This showed metastatic prostatic adenocarcinoma (2.5 cm in greatest diameter) with extranodal extension.  This was sent for [facility] somatic DNA analysis, and showed a TMPRSS2-ETV5 fusion and a PTEN loss, with TMB 3 muts/Mb, and gLOH 21% (high).      PSMA-PET scan (5/30/2023):  This showed six small radiotracer-avid enhancing peritoneal deposits consistent with metastases. There were no amy or osseous metastases.     Laboratories:  His PSA level has dropped from 1.21 ng/mL (5/1/23) down to 0.05 ng/mL (9/20/23). On 10/19/2023, the PSA was 0.06 ng/mL.  On 1/4/2023, it had increased back up to 0.95 ng/mL.  After restarting relugolix, the PSA is back down to 0.06 ng/mL (6/3/24).     ASSESSMENT AND PLAN:  Mr. Gao is a 58-year-old man with a history of high-risk localized prostate cancer (pT2b N0 R1, [place] 4+4=8, PSA 3.7 ng/mL) who underwent radical prostatectomy followed by salvage radiotherapy.  He subsequently developed a biochemical recurrence with a PSA level of 1.59 ng/mL and a PSMA-PET scan showing a solitary right iliac amy metastasis which was surgically resected on 4/29/2022.  The PSA maddy back up to 1.21 ng/mL, and he started relugolix on 6/14/2023, with a favorable treatment response after 5 months.  He is currently on ADT, and his PSA level has dropped to 0.06 ng/mL.  His ECOG score is 0.  His pain score is 0/10.     The patient previously developed a single metastatic recurrence involving a right iliac lymph node, without evidence of  additional local or distant metastatic deposits.  On 4/29/2022, he underwent successful surgical metastasectomy which confirmed metastatic prostatic adenocarcinoma.  Following resection, his PSA level dropped from 1.59 ng/mL down to 0.32 ng/mL.  However, the current PSA is back up to 1.21 ng/mL.  Since his PSA level has risen above 1.0 ng/mL, we decided to obtain another PSMA-PET scan on 5/30/23.  Unfortunately, his the PSMA scan suggested peritoneal implants which are not common in prostate cancer, but these could certainly explain his rising PSA level.  I had presented him with several options at that time. At the end of our prior conversations, we had decided to begin relugolix.  He began this treatment on 6/14/2023.  I had planned to treat him with 6 months of relugolix therapy, followed by a potential cessation of therapy if he achieves a complete PSA response.    After stopping ADT late last year, his PSA level maddy back up to 0.95 ng/mL by 1/4/2024.  At that time, I had discussed several options either involving ADT alone, or the combination of ADT plus enzalutamide consistent with the recent published results of the EMBARK study.  In addition, he could consider enzalutamide monotherapy, as per the third arm of the EMBARK study.  In addition, the patient did have an interesting genomic profile with a high-genome wide PARISH score (gLOH 21%), and thus he may potentially benefit from treatment using a PARP inhibitory such as olaparib.  However, the use of olaparib was declined by his insurance.  Thus, he elected to go back on relugolix in 1/2024, which has resulted in another PSA decline (0.95 --> 0.06 ng/mL).  He is tolerating the relugolix well, except for weight gain, tinnitus and hypertension.  We have discussed potentially considering semaglutide and/or an antihypertensive, and I have encouraged him to discuss this with his primary care physician.  We plan to treat him for a duration of one year (until  1/2025).  His next PSA will be in 9/2024, with a video visit shortly thereafter.     A total of 40 minutes were spent on this patient on the date of the encounter conducting chart review, review of test results, interpretation of tests, patient visit, documentation and discussion with other provider(s). The patient was given the opportunity to ask multiple questions today, all of which were answered to his satisfaction.     Al Morocho M.D.

## 2024-09-03 ENCOUNTER — LAB (OUTPATIENT)
Dept: LAB | Facility: CLINIC | Age: 59
End: 2024-09-03
Payer: COMMERCIAL

## 2024-09-03 DIAGNOSIS — C61 MALIGNANT NEOPLASM OF PROSTATE (H): ICD-10-CM

## 2024-09-03 DIAGNOSIS — C61 PROSTATE CANCER (H): ICD-10-CM

## 2024-09-03 DIAGNOSIS — Z79.899 ENCOUNTER FOR LONG-TERM (CURRENT) USE OF MEDICATIONS: ICD-10-CM

## 2024-09-03 LAB
ALBUMIN SERPL BCG-MCNC: 4.7 G/DL (ref 3.5–5.2)
ALP SERPL-CCNC: 93 U/L (ref 40–150)
ALT SERPL W P-5'-P-CCNC: 56 U/L (ref 0–70)
ANION GAP SERPL CALCULATED.3IONS-SCNC: 10 MMOL/L (ref 7–15)
AST SERPL W P-5'-P-CCNC: 37 U/L (ref 0–45)
BASOPHILS # BLD AUTO: 0.1 10E3/UL (ref 0–0.2)
BASOPHILS NFR BLD AUTO: 1 %
BILIRUB SERPL-MCNC: 0.6 MG/DL
BUN SERPL-MCNC: 12 MG/DL (ref 8–23)
CALCIUM SERPL-MCNC: 10.4 MG/DL (ref 8.8–10.4)
CHLORIDE SERPL-SCNC: 102 MMOL/L (ref 98–107)
CREAT SERPL-MCNC: 1.15 MG/DL (ref 0.67–1.17)
EGFRCR SERPLBLD CKD-EPI 2021: 72 ML/MIN/1.73M2
EOSINOPHIL # BLD AUTO: 0.5 10E3/UL (ref 0–0.7)
EOSINOPHIL NFR BLD AUTO: 10 %
ERYTHROCYTE [DISTWIDTH] IN BLOOD BY AUTOMATED COUNT: 12.3 % (ref 10–15)
GLUCOSE SERPL-MCNC: 109 MG/DL (ref 70–99)
HCO3 SERPL-SCNC: 25 MMOL/L (ref 22–29)
HCT VFR BLD AUTO: 42.4 % (ref 40–53)
HGB BLD-MCNC: 14.9 G/DL (ref 13.3–17.7)
IMM GRANULOCYTES # BLD: 0 10E3/UL
IMM GRANULOCYTES NFR BLD: 1 %
LYMPHOCYTES # BLD AUTO: 1.4 10E3/UL (ref 0.8–5.3)
LYMPHOCYTES NFR BLD AUTO: 28 %
MCH RBC QN AUTO: 31.2 PG (ref 26.5–33)
MCHC RBC AUTO-ENTMCNC: 35.1 G/DL (ref 31.5–36.5)
MCV RBC AUTO: 89 FL (ref 78–100)
MONOCYTES # BLD AUTO: 0.3 10E3/UL (ref 0–1.3)
MONOCYTES NFR BLD AUTO: 7 %
NEUTROPHILS # BLD AUTO: 2.7 10E3/UL (ref 1.6–8.3)
NEUTROPHILS NFR BLD AUTO: 53 %
NRBC # BLD AUTO: 0 10E3/UL
NRBC BLD AUTO-RTO: 0 /100
PLATELET # BLD AUTO: 169 10E3/UL (ref 150–450)
POTASSIUM SERPL-SCNC: 4.2 MMOL/L (ref 3.4–5.3)
PROT SERPL-MCNC: 7.5 G/DL (ref 6.4–8.3)
PSA SERPL DL<=0.01 NG/ML-MCNC: 0.05 NG/ML (ref 0–4.5)
RBC # BLD AUTO: 4.78 10E6/UL (ref 4.4–5.9)
SODIUM SERPL-SCNC: 137 MMOL/L (ref 135–145)
WBC # BLD AUTO: 5 10E3/UL (ref 4–11)

## 2024-09-03 PROCEDURE — 84153 ASSAY OF PSA TOTAL: CPT | Performed by: PATHOLOGY

## 2024-09-03 PROCEDURE — 99000 SPECIMEN HANDLING OFFICE-LAB: CPT | Performed by: PATHOLOGY

## 2024-09-03 PROCEDURE — 85025 COMPLETE CBC W/AUTO DIFF WBC: CPT | Performed by: PATHOLOGY

## 2024-09-03 PROCEDURE — 80053 COMPREHEN METABOLIC PANEL: CPT | Performed by: PATHOLOGY

## 2024-09-03 PROCEDURE — 36415 COLL VENOUS BLD VENIPUNCTURE: CPT | Performed by: PATHOLOGY

## 2024-09-03 PROCEDURE — 84403 ASSAY OF TOTAL TESTOSTERONE: CPT | Performed by: INTERNAL MEDICINE

## 2024-09-04 LAB — TESTOST SERPL-MCNC: 26 NG/DL (ref 240–950)

## 2024-09-08 NOTE — PROGRESS NOTES
Virtual Visit Details    Type of service:  Video Visit   Originating Location (pt. Location):  Home    Distant Location (provider location):  [facility]  Platform used for Video Visit:  Kerwin    -----------------------------------------------------------------------------------------------------------------------------------------------------     FOLLOW UP  -  TELEMEDICINE VISIT     Reason for Visit:  Biochemically-recurrent prostate cancer with PSMA-positive peritoneal deposits, on intermittent relugolix (currently on).     History of Present Illness:  Mr. Gao is a 59-year old man who was diagnosed with prostate cancer in 10/2010, at age 45.  His PSA level at that time was 3.7 ng/mL.  He underwent a radical prostatectomy on 12/6/2010, which revealed prostatic acinar adenocarcinoma, [place] 4+4=8, with organ-confined disease, no extraprostatic extension or seminal vesicle invasion, 0/16 positive lymph nodes, but he did have a positive apical surgical margin.  His final pathological stage was pT2b N0 R1.  [facility] NGS analysis revealed a TMPRSS2-ETV5 fusion and a PTEN deletion, with TMB 3 muts/Mb, and gLOH 21% (high).     Following surgery, he developed a biochemical recurrence with a PSA level reaching 0.34 ng/mL by 8/2013.  He was then treated with salvage external-beam radiotherapy using 7020 cGy over 39 fractions, ending in 12/2013.  He also received concurrent androgen deprivation therapy for 12 months, ending in 9/2014.  His PSA level then remained undetectable for approximately 6 years.  Unfortunately, in 3/2020 he developed a further biochemical recurrence.  His PSA on 3/3/2020 was 0.2, the PSA on 11/20/2020 was 0.3, the PSA on 4/23/2021 was 0.6, and the PSA on 2/17/2022 was 1.59 ng/mL.  The patient then underwent a PSMA-PET scan on 2/17/2022.  This showed a PSMA-avid right iliac lymph node metastasis, without other evidence of local recurrence or distant spread.  On  4/29/2022, he underwent surgical metastasectomy with Dr Antoine without any additional ADT at that time.  As a result, his PSA level has dropped down to 0.32 ng/mL (9/9/2022).  The PSA on 11/10/22 was 0.48 ng/mL, and the PSA on 1/10/23 was 0.70 ng/mL.       By 5/1/2023, his PSA level had again risen and reached 1.21 ng/mL.  He underwent a PSMA-PET (5/30/2023) which showed 6 small tracer-avid peritoneal metastases.  He began relugolix on 6/14/23, and received this agent for about 5 months.  His PSA has dropped down to 0.05 ng/mL in 9/2023.  He went off ADT for a time, and his PSA level has increased back up to 0.79 ng/mL (12/18/23) and then to 0.95 ng/mL (1/4/24).  He is now back on relugolix.  His current PSA level (9/3/24) is 0.05 ng/mL.  We conducted the encounter by way of a video visit today, in order to discuss side effect management and future treatment plans.     Review of Systems:  The patient reports feeling generally well.  He did report some more weight gain, tinnitus in the ears, and rising blood pressure.  He has also developed glucose intolerance.  He does have erectile dysfunction, which has been a problem for him since his radical prostatectomy.  He has started to experience hot flashes since beginning relugolix.  He does not report any additional new signs or symptoms at this time.  He has not had any changes in his weight.  He does not have any cancer-related pain.  A comprehensive 14-system review of symptoms is otherwise generally within normal limits.  His ECOG score is 0.  His pain score is 0/10.     Medications:  Relugolix 120 mg - restarted in 1/2024  Rosuvastatin 10 mg  Bupropion 300 mg  Fluticasone nasal spray  Ibuprofen PRN     Physical Examination:  Mr. Gao is a 59-year-old man who appears comfortable at rest.  His vital signs are unremarkable.  His HEENT examination is within normal limits.  There is no palpable lymphadenopathy.  The cardiac, pulmonary, and gastrointestinal  examinations are within normal limits.  The neuromuscular examination is intact.  The extremities do not demonstrate pitting edema.  The skin evaluation is unremarkable.     Surgical Pathology (4/29/2022):  He underwent surgical metastasectomy of a right retroperitoneal lymph node on 4/29/22.  This showed metastatic prostatic adenocarcinoma (2.5 cm in greatest diameter) with extranodal extension.  This was sent for [employer] somatic DNA analysis, and showed a TMPRSS2-ETV5 fusion and a PTEN loss, with TMB 3 muts/Mb, and gLOH 21% (high).      PSMA-PET scan (5/30/2023):  This showed six small radiotracer-avid enhancing peritoneal deposits consistent with metastases. There were no amy or osseous metastases.     Laboratories:  His PSA level has dropped from 1.21 ng/mL (5/1/23) down to 0.05 ng/mL (19/19/23).  On 1/4/2023, it had increased back up to 0.95 ng/mL.  After restarting relugolix in 1/2024, the PSA is back down to 0.05 ng/mL (9/3/24).        ASSESSMENT AND PLAN:  Mr. Gao is a 59-year-old man with a history of high-risk localized prostate cancer (pT2b N0 R1, [place] 4+4=8, PSA 3.7 ng/mL) who underwent radical prostatectomy followed by salvage radiotherapy.  He subsequently developed a biochemical recurrence with a PSA level of 1.59 ng/mL and a PSMA-PET scan showing a solitary right iliac amy metastasis which was surgically resected on 4/29/2022.  The PSA maddy back up to 1.21 ng/mL, and he started relugolix on 6/14/2023, with a favorable treatment response after 5 months.  He is currently on ADT again, and his PSA level has dropped to 0.05 ng/mL.  His ECOG score is 0.  His pain score is 0/10.     The patient previously developed a single metastatic recurrence involving a right iliac lymph node, without evidence of additional local or distant metastatic deposits.  On 4/29/2022, he underwent successful surgical metastasectomy which confirmed metastatic prostatic adenocarcinoma.  Following resection, his PSA level  dropped from 1.59 ng/mL down to 0.32 ng/mL.  However, the current PSA is back up to 1.21 ng/mL.  Since his PSA level has risen above 1.0 ng/mL, we decided to obtain another PSMA-PET scan on 5/30/23.  Unfortunately, his the PSMA scan suggested peritoneal implants which are not common in prostate cancer, but these could certainly explain his rising PSA level.  I had presented him with several options at that time. At the end of our prior conversations, we had decided to begin relugolix.  He began this treatment on 6/14/2023.  I had planned to treat him with 6 months of relugolix therapy, followed by a potential cessation of therapy if he achieves a complete PSA response.    After stopping ADT late last year, his PSA level maddy back up to 0.95 ng/mL by 1/4/2024.  At that time, I had discussed several options either involving ADT alone, or the combination of ADT plus enzalutamide consistent with the recent published results of the EMBARK study.  In addition, he could consider enzalutamide monotherapy, as per the third arm of the EMBARK study.  In addition, the patient did have an interesting genomic profile with a high-genome wide PARISH score (gLOH 21%), and thus he may potentially benefit from treatment using a PARP inhibitory such as olaparib.  However, the use of olaparib was declined by his insurance.  Thus, he elected to go back on relugolix in 1/2024, which has resulted in another PSA decline (0.95 --> 0.05 ng/mL).  He is tolerating the relugolix well, except for weight gain, glucose intolerance, tinnitus and hypertension.  We have discussed potentially considering semaglutide and/or an antihypertensive, and I have encouraged him to discuss this with his primary care physician (NOTE: since he has not been able to get in with his PCP, I will prescribe the first month of Ozempic but then will refer him to his PCP team for further management).  We plan to treat him with relugolix for a duration of one year (until  1/2025).  His next PSA will be in 11/2024, with a video visit shortly thereafter.     A total of 40 minutes were spent on this patient on the date of the encounter conducting chart review, review of test results, interpretation of tests, patient visit, documentation and discussion with other provider(s). The patient was given the opportunity to ask multiple questions today, all of which were answered to his satisfaction.     Al Morocho M.D.

## 2024-09-09 ENCOUNTER — PATIENT OUTREACH (OUTPATIENT)
Dept: ONCOLOGY | Facility: CLINIC | Age: 59
End: 2024-09-09

## 2024-09-09 ENCOUNTER — VIRTUAL VISIT (OUTPATIENT)
Dept: ONCOLOGY | Facility: CLINIC | Age: 59
End: 2024-09-09
Attending: INTERNAL MEDICINE
Payer: COMMERCIAL

## 2024-09-09 VITALS
DIASTOLIC BLOOD PRESSURE: 97 MMHG | SYSTOLIC BLOOD PRESSURE: 176 MMHG | HEART RATE: 70 BPM | HEIGHT: 71 IN | BODY MASS INDEX: 30.8 KG/M2 | WEIGHT: 220 LBS

## 2024-09-09 DIAGNOSIS — R63.5 ABNORMAL WEIGHT GAIN: ICD-10-CM

## 2024-09-09 DIAGNOSIS — E74.39 GLUCOSE INTOLERANCE: Primary | ICD-10-CM

## 2024-09-09 DIAGNOSIS — C61 MALIGNANT NEOPLASM OF PROSTATE (H): ICD-10-CM

## 2024-09-09 PROCEDURE — 99215 OFFICE O/P EST HI 40 MIN: CPT | Mod: 95 | Performed by: INTERNAL MEDICINE

## 2024-09-09 ASSESSMENT — PAIN SCALES - GENERAL: PAINLEVEL: NO PAIN (0)

## 2024-09-09 NOTE — PROGRESS NOTES
[facility]: Cancer Care                                                                                      Writer reached out to patient's PCP- Dr. Grossman's clinic to inform them that the patient has been experiencing weight gain, and glucose intolerance from hormone therapy.  Informed them that Dr. Morocho prescribed Ozempic, but would like this treatment to be followed by the PCP.  Representative took notes and sent a message to the care team.  Provided them with this writer's direct line in case of questions.    Noris Sweet, RN, BSN, OCN  Oncology RN Care Coordinator  [facility]

## 2024-09-09 NOTE — NURSING NOTE
Current patient location: [address] [place] [address]    Is the patient currently in the state of [place]? YES    Visit mode:VIDEO    If the visit is dropped, the patient can be reconnected by: VIDEO VISIT: Text to cell phone:   Telephone Information:   Mobile [phone]       Will anyone else be joining the visit? NO  (If patient encounters technical issues they should call [phone]    How would you like to obtain your AVS? MyChart    Are changes needed to the allergy or medication list? Pt stated no changes to allergies and Pt stated no med changes    Are refills needed on medications prescribed by this physician? NO    Rooming Documentation:  Questionnaire(s) completed      Reason for visit: YVONNE NIEVES

## 2024-09-09 NOTE — LETTER
9/9/2024      Duglas Gao  [address] [facility] [address]      Dear Colleague,    Thank you for referring your patient, Duglas Gao, to the [facility]. Please see a copy of my visit note below.      Virtual Visit Details    Type of service:  Video Visit   Originating Location (pt. 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     History of Present Illness:  Mr. Gao is a 59-year old man who was diagnosed with prostate cancer in 10/2010, at age 45.  His PSA level at that time was 3.7 ng/mL.  He underwent a radical prostatectomy on 12/6/2010, which revealed prostatic acinar adenocarcinoma, [place] 4+4=8, with organ-confined disease, no extraprostatic extension or seminal vesicle invasion, 0/16 positive lymph nodes, but he did have a positive apical surgical margin.  His final pathological stage was pT2b N0 R1.  [facility] NGS analysis revealed a TMPRSS2-ETV5 fusion and a PTEN deletion, with TMB 3 muts/Mb, and gLOH 21% (high).     Following surgery, he developed a biochemical recurrence with a PSA level reaching 0.34 ng/mL by 8/2013.  He was then treated with salvage external-beam radiotherapy using 7020 cGy over 39 fractions, ending in 12/2013.  He also received concurrent androgen deprivation therapy for 12 months, ending in 9/2014.  His PSA level then remained undetectable for approximately 6 years.  Unfortunately, in 3/2020 he developed a further biochemical recurrence.  His PSA on 3/3/2020 was 0.2, the PSA on 11/20/2020 was 0.3, the PSA on  4/23/2021 was 0.6, and the PSA on 2/17/2022 was 1.59 ng/mL.  The patient then underwent a PSMA-PET scan on 2/17/2022.  This showed a PSMA-avid right iliac lymph node metastasis, without other evidence of local recurrence or distant spread.  On 4/29/2022, he underwent surgical metastasectomy with Dr Antoine without any additional ADT at that time.  As a result, his PSA level has dropped down to 0.32 ng/mL (9/9/2022).  The PSA on 11/10/22 was 0.48 ng/mL, and the PSA on 1/10/23 was 0.70 ng/mL.       By 5/1/2023, his PSA level had again risen and reached 1.21 ng/mL.  He underwent a PSMA-PET (5/30/2023) which showed 6 small tracer-avid peritoneal metastases.  He began relugolix on 6/14/23, and received this agent for about 5 months.  His PSA has dropped down to 0.05 ng/mL in 9/2023.  He went off ADT for a time, and his PSA level has increased back up to 0.79 ng/mL (12/18/23) and then to 0.95 ng/mL (1/4/24).  He is now back on relugolix.  His current PSA level (9/3/24) is 0.05 ng/mL.  We conducted the encounter by way of a video visit today, in order to discuss side effect management and future treatment plans.     Review of Systems:  The patient reports feeling generally well.  He did report some more weight gain, tinnitus in the ears, and rising blood pressure.  He has also developed glucose intolerance.  He does have erectile dysfunction, which has been a problem for him since his radical prostatectomy.  He has started to experience hot flashes since beginning relugolix.  He does not report any additional new signs or symptoms at this time.  He has not had any changes in his weight.  He does not have any cancer-related pain.  A comprehensive 14-system review of symptoms is otherwise generally within normal limits.  His ECOG score is 0.  His pain score is 0/10.     Medications:  Relugolix 120 mg - restarted in 1/2024  Rosuvastatin 10 mg  Bupropion 300 mg  Fluticasone nasal spray  Ibuprofen PRN     Physical Examination:   Mr. Gao is a 59-year-old man who appears comfortable at rest.  His vital signs are unremarkable.  His HEENT examination is within normal limits.  There is no palpable lymphadenopathy.  The cardiac, pulmonary, and gastrointestinal examinations are within normal limits.  The neuromuscular examination is intact.  The extremities do not demonstrate pitting edema.  The skin evaluation is unremarkable.     Surgical Pathology (4/29/2022):  He underwent surgical metastasectomy of a right retroperitoneal lymph node on 4/29/22.  This showed metastatic prostatic adenocarcinoma (2.5 cm in greatest diameter) with extranodal extension.  This was sent for [employer] somatic DNA analysis, and showed a TMPRSS2-ETV5 fusion and a PTEN loss, with TMB 3 muts/Mb, and gLOH 21% (high).      PSMA-PET scan (5/30/2023):  This showed six small radiotracer-avid enhancing peritoneal deposits consistent with metastases. There were no amy or osseous metastases.     Laboratories:  His PSA level has dropped from 1.21 ng/mL (5/1/23) down to 0.05 ng/mL (19/19/23).  On 1/4/2023, it had increased back up to 0.95 ng/mL.  After restarting relugolix in 1/2024, the PSA is back down to 0.05 ng/mL (9/3/24).        ASSESSMENT AND PLAN:  Mr. Gao is a 59-year-old man with a history of high-risk localized prostate cancer (pT2b N0 R1, [place] 4+4=8, PSA 3.7 ng/mL) who underwent radical prostatectomy followed by salvage radiotherapy.  He subsequently developed a biochemical recurrence with a PSA level of 1.59 ng/mL and a PSMA-PET scan showing a solitary right iliac amy metastasis which was surgically resected on 4/29/2022.  The PSA maddy back up to 1.21 ng/mL, and he started relugolix on 6/14/2023, with a favorable treatment response after 5 months.  He is currently on ADT again, and his PSA level has dropped to 0.05 ng/mL.  His ECOG score is 0.  His pain score is 0/10.     The patient previously developed a single metastatic recurrence involving a right  iliac lymph node, without evidence of additional local or distant metastatic deposits.  On 4/29/2022, he underwent successful surgical metastasectomy which confirmed metastatic prostatic adenocarcinoma.  Following resection, his PSA level dropped from 1.59 ng/mL down to 0.32 ng/mL.  However, the current PSA is back up to 1.21 ng/mL.  Since his PSA level has risen above 1.0 ng/mL, we decided to obtain another PSMA-PET scan on 5/30/23.  Unfortunately, his the PSMA scan suggested peritoneal implants which are not common in prostate cancer, but these could certainly explain his rising PSA level.  I had presented him with several options at that time. At the end of our prior conversations, we had decided to begin relugolix.  He began this treatment on 6/14/2023.  I had planned to treat him with 6 months of relugolix therapy, followed by a potential cessation of therapy if he achieves a complete PSA response.    After stopping ADT late last year, his PSA level maddy back up to 0.95 ng/mL by 1/4/2024.  At that time, I had discussed several options either involving ADT alone, or the combination of ADT plus enzalutamide consistent with the recent published results of the EMBARK study.  In addition, he could consider enzalutamide monotherapy, as per the third arm of the EMBARK study.  In addition, the patient did have an interesting genomic profile with a high-genome wide PARISH score (gLOH 21%), and thus he may potentially benefit from treatment using a PARP inhibitory such as olaparib.  However, the use of olaparib was declined by his insurance.  Thus, he elected to go back on relugolix in 1/2024, which has resulted in another PSA decline (0.95 --> 0.05 ng/mL).  He is tolerating the relugolix well, except for weight gain, glucose intolerance, tinnitus and hypertension.  We have discussed potentially considering semaglutide and/or an antihypertensive, and I have encouraged him to discuss this with his primary care physician  (NOTE: since he has not been able to get in with his PCP, I will prescribe the first month of Ozempic but then will refer him to his PCP team for further management).  We plan to treat him with relugolix for a duration of one year (until 1/2025).  His next PSA will be in 11/2024, with a video visit shortly thereafter.     A total of 40 minutes were spent on this patient on the date of the encounter conducting chart review, review of test results, interpretation of tests, patient visit, documentation and discussion with other provider(s). The patient was given the opportunity to ask multiple questions today, all of which were answered to his satisfaction.     Al Morocho M.D.      Again, thank you for allowing me to participate in the care of your patient.        Sincerely,        Al Morocho MD

## 2024-09-10 NOTE — TELEPHONE ENCOUNTER
[facility]: Cancer Care                                                                                      [facility] was contacted by Dr. Grossman's office in response to the request for them to take over monitoring of the semaglutide.  Dr. Grossman would like the patient to see him in clinic.    Writer contacted patient- LVM advising him to schedule an appointment with his PCP in order for them to monitor this medication.  Provided clinic number in case of further questions.    Noris Sweet, RN, BSN, OCN  Oncology RN Care Coordinator  [facility] Clinic

## 2024-11-05 ENCOUNTER — LAB (OUTPATIENT)
Dept: LAB | Facility: CLINIC | Age: 59
End: 2024-11-05
Payer: COMMERCIAL

## 2024-11-05 DIAGNOSIS — C61 MALIGNANT NEOPLASM OF PROSTATE (H): ICD-10-CM

## 2024-11-05 DIAGNOSIS — C61 PROSTATE CANCER (H): ICD-10-CM

## 2024-11-05 DIAGNOSIS — Z79.899 ENCOUNTER FOR LONG-TERM (CURRENT) USE OF MEDICATIONS: ICD-10-CM

## 2024-11-05 LAB
ALBUMIN SERPL BCG-MCNC: 4.6 G/DL (ref 3.5–5.2)
ALP SERPL-CCNC: 95 U/L (ref 40–150)
ALT SERPL W P-5'-P-CCNC: 60 U/L (ref 0–70)
ANION GAP SERPL CALCULATED.3IONS-SCNC: 10 MMOL/L (ref 7–15)
AST SERPL W P-5'-P-CCNC: 39 U/L (ref 0–45)
BASOPHILS # BLD AUTO: 0.1 10E3/UL (ref 0–0.2)
BASOPHILS NFR BLD AUTO: 1 %
BILIRUB SERPL-MCNC: 0.5 MG/DL
BUN SERPL-MCNC: 13 MG/DL (ref 8–23)
CALCIUM SERPL-MCNC: 10.2 MG/DL (ref 8.8–10.4)
CHLORIDE SERPL-SCNC: 101 MMOL/L (ref 98–107)
CREAT SERPL-MCNC: 1.07 MG/DL (ref 0.67–1.17)
EGFRCR SERPLBLD CKD-EPI 2021: 79 ML/MIN/1.73M2
EOSINOPHIL # BLD AUTO: 0.2 10E3/UL (ref 0–0.7)
EOSINOPHIL NFR BLD AUTO: 4 %
ERYTHROCYTE [DISTWIDTH] IN BLOOD BY AUTOMATED COUNT: 12.8 % (ref 10–15)
GLUCOSE SERPL-MCNC: 105 MG/DL (ref 70–99)
HCO3 SERPL-SCNC: 26 MMOL/L (ref 22–29)
HCT VFR BLD AUTO: 42.2 % (ref 40–53)
HGB BLD-MCNC: 14.4 G/DL (ref 13.3–17.7)
IMM GRANULOCYTES # BLD: 0 10E3/UL
IMM GRANULOCYTES NFR BLD: 1 %
LYMPHOCYTES # BLD AUTO: 1.5 10E3/UL (ref 0.8–5.3)
LYMPHOCYTES NFR BLD AUTO: 31 %
MCH RBC QN AUTO: 30.6 PG (ref 26.5–33)
MCHC RBC AUTO-ENTMCNC: 34.1 G/DL (ref 31.5–36.5)
MCV RBC AUTO: 90 FL (ref 78–100)
MONOCYTES # BLD AUTO: 0.4 10E3/UL (ref 0–1.3)
MONOCYTES NFR BLD AUTO: 8 %
NEUTROPHILS # BLD AUTO: 2.7 10E3/UL (ref 1.6–8.3)
NEUTROPHILS NFR BLD AUTO: 55 %
NRBC # BLD AUTO: 0 10E3/UL
NRBC BLD AUTO-RTO: 0 /100
PLATELET # BLD AUTO: 163 10E3/UL (ref 150–450)
POTASSIUM SERPL-SCNC: 4.1 MMOL/L (ref 3.4–5.3)
PROT SERPL-MCNC: 7.3 G/DL (ref 6.4–8.3)
PSA SERPL DL<=0.01 NG/ML-MCNC: 0.05 NG/ML (ref 0–4.5)
RBC # BLD AUTO: 4.71 10E6/UL (ref 4.4–5.9)
SODIUM SERPL-SCNC: 137 MMOL/L (ref 135–145)
WBC # BLD AUTO: 5 10E3/UL (ref 4–11)

## 2024-11-05 PROCEDURE — 36415 COLL VENOUS BLD VENIPUNCTURE: CPT | Performed by: PATHOLOGY

## 2024-11-05 PROCEDURE — 85025 COMPLETE CBC W/AUTO DIFF WBC: CPT | Performed by: PATHOLOGY

## 2024-11-05 PROCEDURE — 84403 ASSAY OF TOTAL TESTOSTERONE: CPT | Performed by: INTERNAL MEDICINE

## 2024-11-05 PROCEDURE — 84153 ASSAY OF PSA TOTAL: CPT | Performed by: PATHOLOGY

## 2024-11-05 PROCEDURE — 80053 COMPREHEN METABOLIC PANEL: CPT | Performed by: PATHOLOGY

## 2024-11-05 PROCEDURE — 99000 SPECIMEN HANDLING OFFICE-LAB: CPT | Performed by: PATHOLOGY

## 2024-11-07 LAB — TESTOST SERPL-MCNC: 15 NG/DL (ref 240–950)

## 2024-11-10 NOTE — PROGRESS NOTES
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     History of Present Illness:  Mr. Gao is a 59-year old man who was diagnosed with prostate cancer in 10/2010, at age 45.  His PSA level at that time was 3.7 ng/mL.  He underwent a radical prostatectomy on 12/6/2010, which revealed prostatic acinar adenocarcinoma, [place] 4+4=8, with organ-confined disease, no extraprostatic extension or seminal vesicle invasion, 0/16 positive lymph nodes, but he did have a positive apical surgical margin.  His final pathological stage was pT2b N0 R1.  [facility] NGS analysis revealed a TMPRSS2-ETV5 fusion and a PTEN deletion, with TMB 3 muts/Mb, and gLOH 21% (high).     Following surgery, he developed a biochemical recurrence with a PSA level reaching 0.34 ng/mL by 8/2013.  He was then treated with salvage external-beam radiotherapy using 7020 cGy over 39 fractions, ending in 12/2013.  He also received concurrent androgen deprivation therapy for 12 months, ending in 9/2014.  His PSA level then remained undetectable for approximately 6 years.  Unfortunately, in 3/2020 he developed a further biochemical recurrence.  His PSA on 3/3/2020 was 0.2, the PSA on 11/20/2020 was 0.3, the PSA on 4/23/2021 was 0.6, and the PSA on 2/17/2022 was 1.59 ng/mL.  The patient then underwent a PSMA-PET scan on 2/17/2022.  This showed a PSMA-avid right iliac lymph node metastasis, without other evidence of local recurrence or distant spread.  On 4/29/2022, he underwent surgical metastasectomy with Dr Antoine without any additional ADT at that time.  As a result, his PSA level has dropped down to 0.32 ng/mL (9/9/2022).  The PSA on 11/10/22 was 0.48 ng/mL, and the PSA on  1/10/23 was 0.70 ng/mL.       By 5/1/2023, his PSA level had again risen and reached 1.21 ng/mL.  He underwent a PSMA-PET (5/30/2023) which showed 6 small tracer-avid peritoneal metastases.  He began relugolix on 6/14/23, and received this agent for about 5 months.  His PSA dropped down to 0.05 ng/mL in 9/2023.  He went off ADT for a time, and his PSA level has increased back up to 0.79 ng/mL (12/18/23) and then to 0.95 ng/mL (1/4/24).  He is now back on relugolix.  His current PSA level (11/5/24) is 0.05 ng/mL.  We conducted the encounter by video visit today, in order to discuss side effect management and future treatment plans.     Review of Systems:  The patient reports feeling generally well.  He did report some more weight gain, tinnitus in the ears, and rising blood pressure.  He has also developed glucose intolerance.  He does have erectile dysfunction, which has been a problem for him since his radical prostatectomy.  He has started to experience hot flashes since beginning relugolix.  He does not report any additional new signs or symptoms at this time.  He has not had any changes in his weight.  He does not have any cancer-related pain.  A comprehensive 14-system review of symptoms is otherwise generally within normal limits.  His ECOG score is 0.  His pain score is 0/10.     Medications:  Relugolix 120 mg - restarted in 1/2024  Rosuvastatin 10 mg  Bupropion 300 mg  Fluticasone nasal spray  Ibuprofen PRN     Physical Examination:  Mr. Gao is a 59-year-old man who appears comfortable at rest.  His vital signs are unremarkable.  His HEENT examination is within normal limits.  There is no palpable lymphadenopathy.  The cardiac, pulmonary, and gastrointestinal examinations are within normal limits.  The neuromuscular examination is intact.  The extremities do not demonstrate pitting edema.  The skin evaluation is unremarkable.     Surgical Pathology (4/29/2022):  He underwent surgical metastasectomy of a  right retroperitoneal lymph node on 4/29/22.  This showed metastatic prostatic adenocarcinoma (2.5 cm in greatest diameter) with extranodal extension.  This was sent for [facility] [employer] somatic DNA analysis, and showed a TMPRSS2-ETV5 fusion and PTEN loss, with TMB 3 muts/Mb, and gLOH 21% (high).      PSMA-PET scan (5/30/2023):  This showed six small radiotracer-avid enhancing peritoneal deposits consistent with metastases. There were no amy or osseous metastases visualized.     Laboratories:  His PSA level has dropped from 1.21 ng/mL (5/1/23) down to 0.05 ng/mL (9/19/23).  On 1/4/2023, it had increased back up to 0.95 ng/mL.  After restarting relugolix in 1/2024, the PSA is back down to 0.05 ng/mL (11/5/24).        ASSESSMENT AND PLAN:  Mr. Gao is a 59-year-old man with a history of high-risk localized prostate cancer (pT2b N0 R1, [place] 4+4=8, PSA 3.7 ng/mL) who underwent radical prostatectomy followed by salvage radiotherapy.  He subsequently developed a biochemical recurrence with a PSA level of 1.59 ng/mL and a PSMA-PET scan showing a solitary right iliac amy metastasis which was surgically resected on 4/29/2022.  The PSA maddy back up to 1.21 ng/mL, and he started relugolix on 6/14/2023, with a favorable treatment response after 5 months.  He is currently on ADT again, and his PSA level has dropped to 0.05 ng/mL.  His ECOG score is 0.  His pain score is 0/10.     The patient previously developed a single metastatic recurrence involving a right iliac lymph node, without evidence of additional local or distant metastatic deposits.  On 4/29/2022, he underwent successful surgical metastasectomy which confirmed metastatic prostatic adenocarcinoma.  Following resection, his PSA level dropped from 1.59 ng/mL down to 0.32 ng/mL.  However, the current PSA is back up to 1.21 ng/mL.  Since his PSA level has risen above 1.0 ng/mL, we decided to obtain another PSMA-PET scan on 5/30/23.  Unfortunately, his the PSMA scan  suggested peritoneal implants which are not common in prostate cancer, but these could certainly explain his rising PSA level.  I had presented him with several options at that time. At the end of our prior conversations, we had decided to begin relugolix.  He began this treatment on 6/14/2023.  I had planned to treat him with 6 months of relugolix therapy, followed by a potential cessation of therapy if he achieves a complete PSA response.    However after stopping ADT late last year, his PSA level maddy back up to 0.95 ng/mL by 1/4/2024.  At that time, I had discussed several options either involving ADT alone, or the combination of ADT plus enzalutamide consistent with the recent published results of the EMBARK study.  In addition, he could consider enzalutamide monotherapy, as per the third arm of the EMBARK study.  The patient did have an interesting genomic profile with a high-genome wide PARISH score (gLOH 21%), and thus he may potentially benefit from treatment using a PARP inhibitory such as olaparib.  However, the use of olaparib was declined by his insurance.  Thus, he elected to go back on relugolix in 1/2024, which has resulted in another PSA decline (0.95 --> 0.05 ng/mL).  He is tolerating the relugolix well, except for weight gain, glucose intolerance, tinnitus and hypertension.  We have discussed potentially considering semaglutide and/or an antihypertensive, and I have encouraged him to discuss this with his primary care physician (NOTE: since he has not been able to get in with his PCP, I will prescribe the first month of Ozempic but then will refer him to his PCP team for further management).  We plan to treat him with relugolix for a duration of one year (until 1/2025).  His next PSA will be in 1/2025, with a video visit shortly thereafter.  We will obtain the next PSMA-PET scan when the PSA level reaches 0.5 ng/mL.     A total of 40 minutes were spent on this patient on the date of the encounter  conducting chart review, review of test results, interpretation of tests, patient visit, documentation and discussion with other provider(s). The patient was given the opportunity to ask multiple questions today, all of which were answered to his satisfaction.     Al Morocho M.D.

## 2024-11-11 ENCOUNTER — VIRTUAL VISIT (OUTPATIENT)
Dept: ONCOLOGY | Facility: CLINIC | Age: 59
End: 2024-11-11
Attending: INTERNAL MEDICINE
Payer: COMMERCIAL

## 2024-11-11 DIAGNOSIS — C61 MALIGNANT NEOPLASM OF PROSTATE (H): Primary | ICD-10-CM

## 2024-11-11 PROCEDURE — 99215 OFFICE O/P EST HI 40 MIN: CPT | Mod: 95 | Performed by: INTERNAL MEDICINE

## 2024-11-11 NOTE — LETTER
11/11/2024      Duglas Gao  [address] [facility] [address]      Dear Colleague,    Thank you for referring your patient, Duglas Gao, to the [facility]. Please see a copy of my visit note below.        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     History of Present Illness:  Mr. aGo is a 59-year old man who was diagnosed with prostate cancer in 10/2010, at age 45.  His PSA level at that time was 3.7 ng/mL.  He underwent a radical prostatectomy on 12/6/2010, which revealed prostatic acinar adenocarcinoma, [place] 4+4=8, with organ-confined disease, no extraprostatic extension or seminal vesicle invasion, 0/16 positive lymph nodes, but he did have a positive apical surgical margin.  His final pathological stage was pT2b N0 R1.  [facility] NGS analysis revealed a TMPRSS2-ETV5 fusion and a PTEN deletion, with TMB 3 muts/Mb, and gLOH 21% (high).     Following surgery, he developed a biochemical recurrence with a PSA level reaching 0.34 ng/mL by 8/2013.  He was then treated with salvage external-beam radiotherapy using 7020 cGy over 39 fractions, ending in 12/2013.  He also received concurrent androgen deprivation therapy for 12 months, ending in 9/2014.  His PSA level then remained undetectable for approximately 6 years.  Unfortunately, in 3/2020 he developed a further biochemical recurrence.  His PSA on 3/3/2020 was 0.2, the PSA on 11/20/2020 was 0.3, the PSA on 4/23/2021 was 0.6, and the PSA on 2/17/2022 was 1.59 ng/mL.  The patient then underwent a PSMA-PET scan on 2/17/2022.  This showed a PSMA-avid right iliac lymph node metastasis, without other evidence of local recurrence or  distant spread.  On 4/29/2022, he underwent surgical metastasectomy with Dr Antoine without any additional ADT at that time.  As a result, his PSA level has dropped down to 0.32 ng/mL (9/9/2022).  The PSA on 11/10/22 was 0.48 ng/mL, and the PSA on 1/10/23 was 0.70 ng/mL.       By 5/1/2023, his PSA level had again risen and reached 1.21 ng/mL.  He underwent a PSMA-PET (5/30/2023) which showed 6 small tracer-avid peritoneal metastases.  He began relugolix on 6/14/23, and received this agent for about 5 months.  His PSA dropped down to 0.05 ng/mL in 9/2023.  He went off ADT for a time, and his PSA level has increased back up to 0.79 ng/mL (12/18/23) and then to 0.95 ng/mL (1/4/24).  He is now back on relugolix.  His current PSA level (11/5/24) is 0.05 ng/mL.  We conducted the encounter by video visit today, in order to discuss side effect management and future treatment plans.     Review of Systems:  The patient reports feeling generally well.  He did report some more weight gain, tinnitus in the ears, and rising blood pressure.  He has also developed glucose intolerance.  He does have erectile dysfunction, which has been a problem for him since his radical prostatectomy.  He has started to experience hot flashes since beginning relugolix.  He does not report any additional new signs or symptoms at this time.  He has not had any changes in his weight.  He does not have any cancer-related pain.  A comprehensive 14-system review of symptoms is otherwise generally within normal limits.  His ECOG score is 0.  His pain score is 0/10.     Medications:  Relugolix 120 mg - restarted in 1/2024  Rosuvastatin 10 mg  Bupropion 300 mg  Fluticasone nasal spray  Ibuprofen PRN     Physical Examination:  Mr. Gao is a 59-year-old man who appears comfortable at rest.  His vital signs are unremarkable.  His HEENT examination is within normal limits.  There is no palpable lymphadenopathy.  The cardiac, pulmonary, and gastrointestinal  examinations are within normal limits.  The neuromuscular examination is intact.  The extremities do not demonstrate pitting edema.  The skin evaluation is unremarkable.     Surgical Pathology (4/29/2022):  He underwent surgical metastasectomy of a right retroperitoneal lymph node on 4/29/22.  This showed metastatic prostatic adenocarcinoma (2.5 cm in greatest diameter) with extranodal extension.  This was sent for [facility] [employer] somatic DNA analysis, and showed a TMPRSS2-ETV5 fusion and PTEN loss, with TMB 3 muts/Mb, and gLOH 21% (high).      PSMA-PET scan (5/30/2023):  This showed six small radiotracer-avid enhancing peritoneal deposits consistent with metastases. There were no amy or osseous metastases visualized.     Laboratories:  His PSA level has dropped from 1.21 ng/mL (5/1/23) down to 0.05 ng/mL (9/19/23).  On 1/4/2023, it had increased back up to 0.95 ng/mL.  After restarting relugolix in 1/2024, the PSA is back down to 0.05 ng/mL (11/5/24).        ASSESSMENT AND PLAN:  Mr. Gao is a 59-year-old man with a history of high-risk localized prostate cancer (pT2b N0 R1, [place] 4+4=8, PSA 3.7 ng/mL) who underwent radical prostatectomy followed by salvage radiotherapy.  He subsequently developed a biochemical recurrence with a PSA level of 1.59 ng/mL and a PSMA-PET scan showing a solitary right iliac amy metastasis which was surgically resected on 4/29/2022.  The PSA maddy back up to 1.21 ng/mL, and he started relugolix on 6/14/2023, with a favorable treatment response after 5 months.  He is currently on ADT again, and his PSA level has dropped to 0.05 ng/mL.  His ECOG score is 0.  His pain score is 0/10.     The patient previously developed a single metastatic recurrence involving a right iliac lymph node, without evidence of additional local or distant metastatic deposits.  On 4/29/2022, he underwent successful surgical metastasectomy which confirmed metastatic prostatic adenocarcinoma.  Following resection, his  PSA level dropped from 1.59 ng/mL down to 0.32 ng/mL.  However, the current PSA is back up to 1.21 ng/mL.  Since his PSA level has risen above 1.0 ng/mL, we decided to obtain another PSMA-PET scan on 5/30/23.  Unfortunately, his the PSMA scan suggested peritoneal implants which are not common in prostate cancer, but these could certainly explain his rising PSA level.  I had presented him with several options at that time. At the end of our prior conversations, we had decided to begin relugolix.  He began this treatment on 6/14/2023.  I had planned to treat him with 6 months of relugolix therapy, followed by a potential cessation of therapy if he achieves a complete PSA response.    However after stopping ADT late last year, his PSA level maddy back up to 0.95 ng/mL by 1/4/2024.  At that time, I had discussed several options either involving ADT alone, or the combination of ADT plus enzalutamide consistent with the recent published results of the EMBARK study.  In addition, he could consider enzalutamide monotherapy, as per the third arm of the EMBARK study.  The patient did have an interesting genomic profile with a high-genome wide PARISH score (gLOH 21%), and thus he may potentially benefit from treatment using a PARP inhibitory such as olaparib.  However, the use of olaparib was declined by his insurance.  Thus, he elected to go back on relugolix in 1/2024, which has resulted in another PSA decline (0.95 --> 0.05 ng/mL).  He is tolerating the relugolix well, except for weight gain, glucose intolerance, tinnitus and hypertension.  We have discussed potentially considering semaglutide and/or an antihypertensive, and I have encouraged him to discuss this with his primary care physician (NOTE: since he has not been able to get in with his PCP, I will prescribe the first month of Ozempic but then will refer him to his PCP team for further management).  We plan to treat him with relugolix for a duration of one year (until  1/2025).  His next PSA will be in 1/2025, with a video visit shortly thereafter.  We will obtain the next PSMA-PET scan when the PSA level reaches 0.5 ng/mL.     A total of 40 minutes were spent on this patient on the date of the encounter conducting chart review, review of test results, interpretation of tests, patient visit, documentation and discussion with other provider(s). The patient was given the opportunity to ask multiple questions today, all of which were answered to his satisfaction.     Al Morocho M.D.      Again, thank you for allowing me to participate in the care of your patient.        Sincerely,        Al Morocho MD

## 2024-11-21 DIAGNOSIS — C61 PROSTATE CANCER (H): ICD-10-CM

## 2024-11-21 RX ORDER — RELUGOLIX 120 MG/1
TABLET, FILM COATED ORAL
Qty: 30 TABLET | Refills: 10 | OUTPATIENT
Start: 2024-11-21

## 2024-11-21 NOTE — TELEPHONE ENCOUNTER
Pending Prescriptions:                       Disp   Refills    ORGOVYX 120 MG tablet [Pharmacy Med Name:*30 tab*10           Sig: TAKE ONE TABLET BY MOUTH ONCE DAILY . LEAVE           MEDICATION IN BOTTLE UNTIL READY TO BE TAKEN.    Last prescribing provider:     Last clinic visit date: 11/11/24 w/    Recommendations for requested medication (if none, N/A): Copied from last OV note: He is tolerating the relugolix well, except for weight gain, glucose intolerance, tinnitus and hypertension.  We have discussed potentially considering semaglutide and/or an antihypertensive, and I have encouraged him to discuss this with his primary care physician (NOTE: since he has not been able to get in with his PCP, I will prescribe the first month of Ozempic but then will refer him to his PCP team for further management).  We plan to treat him with relugolix for a duration of one year (until 1/2025).  His next PSA will be in 1/2025, with a video visit shortly thereafter.     Any other pertinent information (if none, N/A): N/A    Refilled: Y/N, if NO, why?

## 2025-01-07 ENCOUNTER — TELEPHONE (OUTPATIENT)
Dept: ONCOLOGY | Facility: CLINIC | Age: 60
End: 2025-01-07
Payer: COMMERCIAL

## 2025-01-07 NOTE — TELEPHONE ENCOUNTER
PA Initiation    Medication: ORGOVYX 120 MG PO TABS  Insurance Company: [employer] - Phone [phone] Fax [phone]  Pharmacy Filling the Rx: [facility] - [place], [place] - [facility] [address]  Start Date: 1/7/2025        Thank you,  Raquel Cohen Oncology/Transplant Liaison  Phone: [phone]  Fax: [phone]

## 2025-01-07 NOTE — TELEPHONE ENCOUNTER
Prior Authorization Approval    Medication: ORGOVYX 120 MG PO TABS  Authorization Effective Date: 12/8/2024  Authorization Expiration Date: 1/7/2026  Approved Dose/Quantity:   Reference #: [id number]   Insurance Company: [employer] - Phone [phone] Fax [phone]  Expected CoPay: $    CoPay Card Available:      Financial Assistance Needed:   Which Pharmacy is filling the prescription: [facility] - [place], [place] - [address] [place] [address]  Pharmacy Notified:   Patient Notified:         Mey Cortez CPhTOncology Pharmacy Liaison     [facility]  Clinics & Surgery [address] [address]  Office: [phone]  Fax: [phone]  [email or web address]

## 2025-01-20 ENCOUNTER — LAB (OUTPATIENT)
Dept: LAB | Facility: CLINIC | Age: 60
End: 2025-01-20
Payer: COMMERCIAL

## 2025-01-20 DIAGNOSIS — C61 MALIGNANT NEOPLASM OF PROSTATE (H): ICD-10-CM

## 2025-01-20 LAB
ALBUMIN SERPL BCG-MCNC: 4.7 G/DL (ref 3.5–5.2)
ALP SERPL-CCNC: 99 U/L (ref 40–150)
ALT SERPL W P-5'-P-CCNC: 57 U/L (ref 0–70)
ANION GAP SERPL CALCULATED.3IONS-SCNC: 10 MMOL/L (ref 7–15)
AST SERPL W P-5'-P-CCNC: 33 U/L (ref 0–45)
BASOPHILS # BLD AUTO: 0.1 10E3/UL (ref 0–0.2)
BASOPHILS NFR BLD AUTO: 1 %
BILIRUB SERPL-MCNC: 0.4 MG/DL
BUN SERPL-MCNC: 12.3 MG/DL (ref 8–23)
CALCIUM SERPL-MCNC: 10.1 MG/DL (ref 8.8–10.4)
CHLORIDE SERPL-SCNC: 104 MMOL/L (ref 98–107)
CREAT SERPL-MCNC: 1.04 MG/DL (ref 0.67–1.17)
EGFRCR SERPLBLD CKD-EPI 2021: 82 ML/MIN/1.73M2
EOSINOPHIL # BLD AUTO: 0.4 10E3/UL (ref 0–0.7)
EOSINOPHIL NFR BLD AUTO: 7 %
ERYTHROCYTE [DISTWIDTH] IN BLOOD BY AUTOMATED COUNT: 12.5 % (ref 10–15)
GLUCOSE SERPL-MCNC: 98 MG/DL (ref 70–99)
HCO3 SERPL-SCNC: 26 MMOL/L (ref 22–29)
HCT VFR BLD AUTO: 43.2 % (ref 40–53)
HGB BLD-MCNC: 14.6 G/DL (ref 13.3–17.7)
IMM GRANULOCYTES # BLD: 0.1 10E3/UL
IMM GRANULOCYTES NFR BLD: 1 %
LYMPHOCYTES # BLD AUTO: 1.7 10E3/UL (ref 0.8–5.3)
LYMPHOCYTES NFR BLD AUTO: 32 %
MCH RBC QN AUTO: 30.5 PG (ref 26.5–33)
MCHC RBC AUTO-ENTMCNC: 33.8 G/DL (ref 31.5–36.5)
MCV RBC AUTO: 90 FL (ref 78–100)
MONOCYTES # BLD AUTO: 0.3 10E3/UL (ref 0–1.3)
MONOCYTES NFR BLD AUTO: 7 %
NEUTROPHILS # BLD AUTO: 2.7 10E3/UL (ref 1.6–8.3)
NEUTROPHILS NFR BLD AUTO: 52 %
NRBC # BLD AUTO: 0 10E3/UL
NRBC BLD AUTO-RTO: 0 /100
PLATELET # BLD AUTO: 169 10E3/UL (ref 150–450)
POTASSIUM SERPL-SCNC: 4.2 MMOL/L (ref 3.4–5.3)
PROT SERPL-MCNC: 7.4 G/DL (ref 6.4–8.3)
PSA SERPL DL<=0.01 NG/ML-MCNC: 0.08 NG/ML (ref 0–4.5)
RBC # BLD AUTO: 4.79 10E6/UL (ref 4.4–5.9)
SODIUM SERPL-SCNC: 140 MMOL/L (ref 135–145)
WBC # BLD AUTO: 5.2 10E3/UL (ref 4–11)

## 2025-01-20 PROCEDURE — 99000 SPECIMEN HANDLING OFFICE-LAB: CPT | Performed by: PATHOLOGY

## 2025-01-20 PROCEDURE — 84403 ASSAY OF TOTAL TESTOSTERONE: CPT | Performed by: INTERNAL MEDICINE

## 2025-01-20 PROCEDURE — 80053 COMPREHEN METABOLIC PANEL: CPT | Performed by: PATHOLOGY

## 2025-01-20 PROCEDURE — 85025 COMPLETE CBC W/AUTO DIFF WBC: CPT | Performed by: PATHOLOGY

## 2025-01-20 PROCEDURE — 84153 ASSAY OF PSA TOTAL: CPT | Performed by: PATHOLOGY

## 2025-01-20 PROCEDURE — 36415 COLL VENOUS BLD VENIPUNCTURE: CPT | Performed by: PATHOLOGY

## 2025-01-21 NOTE — PROGRESS NOTES
Virtual Visit Details    Type of service:  Video Visit   Originating Location (pt. 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     History of Present Illness:  Mr. Gao is a 59-year old man who was diagnosed with prostate cancer in 10/2010, at age 45.  His PSA level at that time was 3.7 ng/mL.  He underwent a radical prostatectomy on 12/6/2010, which revealed prostatic acinar adenocarcinoma, [place] 4+4=8, with organ-confined disease, no extraprostatic extension or seminal vesicle invasion, 0/16 positive lymph nodes, but he did have a positive apical surgical margin.  His final pathological stage was pT2b N0 R1.  [facility] NGS analysis revealed a TMPRSS2-ETV5 fusion and a PTEN deletion, with TMB 3 muts/Mb, and gLOH 21% (high).     Following surgery, he developed a biochemical recurrence with a PSA level reaching 0.34 ng/mL by 8/2013.  He was then treated with salvage external-beam radiotherapy using 7020 cGy over 39 fractions, ending in 12/2013.  He also received concurrent androgen deprivation therapy for 12 months, ending in 9/2014.  His PSA level then remained undetectable for approximately 6 years.  Unfortunately, in 3/2020 he developed a further biochemical recurrence.  His PSA on 3/3/2020 was 0.2, the PSA on 11/20/2020 was 0.3, the PSA on 4/23/2021 was 0.6, and the PSA on 2/17/2022 was 1.59 ng/mL.  The patient then underwent a PSMA-PET scan on 2/17/2022.  This showed a PSMA-avid right iliac lymph node metastasis, without other evidence of local recurrence or distant spread.  On  4/29/2022, he underwent surgical metastasectomy with Dr Antoine without any additional ADT at that time.  As a result, his PSA level has dropped down to 0.32 ng/mL (9/9/2022).  The PSA on 11/10/22 was 0.48 ng/mL, and the PSA on 1/10/23 was 0.70 ng/mL.       By 5/1/2023, his PSA level had again risen and reached 1.21 ng/mL.  He underwent a PSMA-PET (5/30/2023) which showed 6 small tracer-avid peritoneal metastases.  He began relugolix on 6/14/23, and received this agent for about 5 months.  His PSA dropped down to 0.05 ng/mL in 9/2023.  He went off ADT for a time, and his PSA level has increased back up to 0.95 ng/mL (1/4/24).  He is now back on relugolix, and his PSA level dropped down to a martin of 0.05 ng/mL.  His current PSA level (1/20/25) is 0.08 ng/mL.  We conducted the encounter by video visit today, to discuss side effect management and future treatment plans.     Review of Systems:  The patient reports feeling generally well.  He did report some more weight gain, tinnitus in the ears, and rising blood pressure.  He has also developed glucose intolerance.  He does have erectile dysfunction, which has been a problem for him since his radical prostatectomy.  He has started to experience hot flashes since beginning relugolix.  He does not report any additional new signs or symptoms at this time.  He has not had any changes in his weight.  He does not have any cancer-related pain.  A comprehensive 14-system review of symptoms is otherwise generally within normal limits.  His ECOG score is 0.  His pain score is 0/10.     Medications:  Relugolix 120 mg - restarted in 1/2024  Rosuvastatin 10 mg  Bupropion 300 mg  Fluticasone nasal spray  Ibuprofen PRN     Physical Examination:  Mr. Gao is a 59-year-old man who appears comfortable at rest.  His vital signs are unremarkable.  His HEENT examination is within normal limits.  There is no palpable lymphadenopathy.  The cardiac, pulmonary, and gastrointestinal  examinations are within normal limits.  The neuromuscular examination is intact.  The extremities do not demonstrate pitting edema.  The skin evaluation is unremarkable.     Surgical Pathology (4/29/2022):  He underwent surgical metastasectomy of a right retroperitoneal lymph node on 4/29/22.  This showed metastatic prostatic adenocarcinoma (2.5 cm in greatest diameter) with extranodal extension.  [facility] NGS analysis showed a TMPRSS2-ETV5 fusion and PTEN loss, with TMB 3 muts/Mb, and gLOH 21% (high).      PSMA-PET scan (5/30/2023):  This showed six small radiotracer-avid enhancing peritoneal deposits consistent with metastases. There were no amy or osseous metastases visualized.     Laboratories (1/20/2025):  His PSA level has dropped from 1.21 ng/mL (5/1/23) down to 0.05 ng/mL (9/19/23).  On 1/4/2023, it had increased back up to 0.95 ng/mL.  After restarting relugolix in 1/2024, the PSA is back down to 0.05 ng/mL (11/5/24).  On 1/20/25, the PSA was 0.08 ng/mL.        ASSESSMENT AND PLAN:  Mr. Gao is a 59-year-old man with a history of high-risk localized prostate cancer (pT2b N0 R1, [place] 4+4=8, PSA 3.7 ng/mL) who underwent radical prostatectomy followed by salvage radiotherapy.  He subsequently developed a biochemical recurrence with a PSA level of 1.59 ng/mL and a PSMA-PET scan showing a solitary right iliac amy metastasis which was surgically resected on 4/29/2022.  The PSA maddy back up to 1.21 ng/mL, and he started relugolix on 6/14/2023, with a favorable treatment response after 5 months.  He is now on ADT again, and his PSA level dropped to 0.05 ng/mL but is currently 0.08 ng/mL.  His ECOG score is 0.  His pain score is 0/10.     The patient previously developed a single metastatic recurrence involving a right iliac lymph node, without evidence of additional local or distant metastatic deposits.  On 4/29/2022, he underwent successful surgical metastasectomy which confirmed metastatic prostatic  adenocarcinoma.  Following resection, his PSA level dropped from 1.59 ng/mL down to 0.32 ng/mL.  However, the current PSA is back up to 1.21 ng/mL.  Since his PSA level has risen above 1.0 ng/mL, we decided to obtain another PSMA-PET scan on 5/30/23.  Unfortunately, his the PSMA scan suggested peritoneal implants which are not common in prostate cancer, but these could certainly explain his rising PSA level.  I had presented him with several options at that time. At the end of our prior conversations, we had decided to begin relugolix.  He began this treatment on 6/14/2023.  I had planned to treat him with 6-12 months of relugolix therapy, followed by a potential cessation of therapy if he achieves a complete PSA response.    However after stopping ADT late last year, his PSA level maddy back up to 0.95 ng/mL by 1/4/2024.  At that time, I had discussed several options either involving ADT alone, or the combination of ADT plus enzalutamide consistent with the recent published results of the EMBARK study.  In addition, he could consider enzalutamide monotherapy, as per the third arm of the EMBARK study.  The patient did have an interesting genomic profile with a high-genome wide PARISH score (gLOH 21%), and thus he may potentially benefit from treatment using a PARP inhibitory such as olaparib.  However, the use of olaparib was declined by his insurance.  Thus, he elected to go back on relugolix in 1/2024, which has resulted in another PSA decline (0.95 --> 0.05 ng/mL).  He is tolerating the relugolix generally well, except for weight gain, glucose intolerance, tinnitus, and hypertension.  We had planned to treat him with relugolix for a duration of one year (until 1/2025).  He is comfortable stopping the ADT at this time.  His next PSA will be in 3/2025, with a video visit shortly thereafter.  We will obtain the next PSMA-PET scan when the PSA level reaches 0.5 ng/mL.    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  As discussed above, he has a high gLOH score of 21% which greatly increases his odds of success with PARP inhibitor treatment.  I have decided to prescribe olaparib 300 mg BID today.  We will await insurance authorization before dispensing this.     A total of 40 minutes were spent on this patient on the date of the encounter conducting chart review, review of test results, interpretation of tests, patient visit, documentation and discussion with other provider(s).  The patient was given the opportunity to ask multiple questions today, all of which were answered to his satisfaction.     Al Morocho M.D.

## 2025-01-22 ENCOUNTER — VIRTUAL VISIT (OUTPATIENT)
Dept: ONCOLOGY | Facility: CLINIC | Age: 60
End: 2025-01-22
Attending: INTERNAL MEDICINE
Payer: COMMERCIAL

## 2025-01-22 ENCOUNTER — MYC MEDICAL ADVICE (OUTPATIENT)
Dept: ONCOLOGY | Facility: CLINIC | Age: 60
End: 2025-01-22

## 2025-01-22 VITALS
DIASTOLIC BLOOD PRESSURE: 82 MMHG | BODY MASS INDEX: 30.48 KG/M2 | WEIGHT: 225 LBS | HEART RATE: 62 BPM | SYSTOLIC BLOOD PRESSURE: 136 MMHG | HEIGHT: 72 IN

## 2025-01-22 DIAGNOSIS — Z79.899 ENCOUNTER FOR LONG-TERM (CURRENT) USE OF MEDICATIONS: Primary | ICD-10-CM

## 2025-01-22 DIAGNOSIS — C61 MALIGNANT NEOPLASM OF PROSTATE (H): Primary | ICD-10-CM

## 2025-01-22 DIAGNOSIS — C61 PROSTATE CANCER (H): ICD-10-CM

## 2025-01-22 LAB — TESTOST SERPL-MCNC: 22 NG/DL (ref 240–950)

## 2025-01-22 PROCEDURE — 98007 SYNCH AUDIO-VIDEO EST HI 40: CPT | Performed by: INTERNAL MEDICINE

## 2025-01-22 NOTE — NURSING NOTE
Patient confirms medications and allergies are accurate via patients echeck in completion, and or denies any changes since last reviewed/verified.       Current patient location: [address] [place] [id number]    Is the patient currently in the state of [place]? YES    Visit mode: VIDEO    If the visit is dropped, the patient can be reconnected by:VIDEO VISIT: Text to cell phone:   Telephone Information:   Mobile [phone]       Will anyone else be joining the visit? Rebecca Delgado  (If patient encounters technical issues they should call [phone])    Are changes needed to the allergy or medication list? No    Are refills needed on medications prescribed by this physician? NO    Rooming Documentation:  Unable to complete questionnaire(s) due to time    Reason for visit: RECHECK    Krystina VELEZF

## 2025-01-22 NOTE — LETTER
1/22/2025      Duglas Gao  [address] [facility] [address]      Dear Colleague,    Thank you for referring your patient, Duglas Gao, to the [facility]. Please see a copy of my visit note below.      Virtual Visit Details    Type of service:  Video Visit   Originating Location (pt. 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     History of Present Illness:  Mr. Gao is a 59-year old man who was diagnosed with prostate cancer in 10/2010, at age 45.  His PSA level at that time was 3.7 ng/mL.  He underwent a radical prostatectomy on 12/6/2010, which revealed prostatic acinar adenocarcinoma, [place] 4+4=8, with organ-confined disease, no extraprostatic extension or seminal vesicle invasion, 0/16 positive lymph nodes, but he did have a positive apical surgical margin.  His final pathological stage was pT2b N0 R1.  [facility] NGS analysis revealed a TMPRSS2-ETV5 fusion and a PTEN deletion, with TMB 3 muts/Mb, and gLOH 21% (high).     Following surgery, he developed a biochemical recurrence with a PSA level reaching 0.34 ng/mL by 8/2013.  He was then treated with salvage external-beam radiotherapy using 7020 cGy over 39 fractions, ending in 12/2013.  He also received concurrent androgen deprivation therapy for 12 months, ending in 9/2014.  His PSA level then remained undetectable for approximately 6 years.  Unfortunately, in 3/2020 he developed a further biochemical recurrence.  His PSA on 3/3/2020 was 0.2, the PSA on 11/20/2020 was 0.3, the PSA on  4/23/2021 was 0.6, and the PSA on 2/17/2022 was 1.59 ng/mL.  The patient then underwent a PSMA-PET scan on 2/17/2022.  This showed a PSMA-avid right iliac lymph node metastasis, without other evidence of local recurrence or distant spread.  On 4/29/2022, he underwent surgical metastasectomy with Dr Antoine without any additional ADT at that time.  As a result, his PSA level has dropped down to 0.32 ng/mL (9/9/2022).  The PSA on 11/10/22 was 0.48 ng/mL, and the PSA on 1/10/23 was 0.70 ng/mL.       By 5/1/2023, his PSA level had again risen and reached 1.21 ng/mL.  He underwent a PSMA-PET (5/30/2023) which showed 6 small tracer-avid peritoneal metastases.  He began relugolix on 6/14/23, and received this agent for about 5 months.  His PSA dropped down to 0.05 ng/mL in 9/2023.  He went off ADT for a time, and his PSA level has increased back up to 0.95 ng/mL (1/4/24).  He is now back on relugolix, and his PSA level dropped down to a martin of 0.05 ng/mL.  His current PSA level (1/20/25) is 0.08 ng/mL.  We conducted the encounter by video visit today, to discuss side effect management and future treatment plans.     Review of Systems:  The patient reports feeling generally well.  He did report some more weight gain, tinnitus in the ears, and rising blood pressure.  He has also developed glucose intolerance.  He does have erectile dysfunction, which has been a problem for him since his radical prostatectomy.  He has started to experience hot flashes since beginning relugolix.  He does not report any additional new signs or symptoms at this time.  He has not had any changes in his weight.  He does not have any cancer-related pain.  A comprehensive 14-system review of symptoms is otherwise generally within normal limits.  His ECOG score is 0.  His pain score is 0/10.     Medications:  Relugolix 120 mg - restarted in 1/2024  Rosuvastatin 10 mg  Bupropion 300 mg  Fluticasone nasal spray  Ibuprofen PRN     Physical  Examination:  Mr. Gao is a 59-year-old man who appears comfortable at rest.  His vital signs are unremarkable.  His HEENT examination is within normal limits.  There is no palpable lymphadenopathy.  The cardiac, pulmonary, and gastrointestinal examinations are within normal limits.  The neuromuscular examination is intact.  The extremities do not demonstrate pitting edema.  The skin evaluation is unremarkable.     Surgical Pathology (4/29/2022):  He underwent surgical metastasectomy of a right retroperitoneal lymph node on 4/29/22.  This showed metastatic prostatic adenocarcinoma (2.5 cm in greatest diameter) with extranodal extension.  [facility] NGS analysis showed a TMPRSS2-ETV5 fusion and PTEN loss, with TMB 3 muts/Mb, and gLOH 21% (high).      PSMA-PET scan (5/30/2023):  This showed six small radiotracer-avid enhancing peritoneal deposits consistent with metastases. There were no amy or osseous metastases visualized.     Laboratories (1/20/2025):  His PSA level has dropped from 1.21 ng/mL (5/1/23) down to 0.05 ng/mL (9/19/23).  On 1/4/2023, it had increased back up to 0.95 ng/mL.  After restarting relugolix in 1/2024, the PSA is back down to 0.05 ng/mL (11/5/24).  On 1/20/25, the PSA was 0.08 ng/mL.        ASSESSMENT AND PLAN:  Mr. Gao is a 59-year-old man with a history of high-risk localized prostate cancer (pT2b N0 R1, [place] 4+4=8, PSA 3.7 ng/mL) who underwent radical prostatectomy followed by salvage radiotherapy.  He subsequently developed a biochemical recurrence with a PSA level of 1.59 ng/mL and a PSMA-PET scan showing a solitary right iliac amy metastasis which was surgically resected on 4/29/2022.  The PSA maddy back up to 1.21 ng/mL, and he started relugolix on 6/14/2023, with a favorable treatment response after 5 months.  He is now on ADT again, and his PSA level dropped to 0.05 ng/mL but is currently 0.08 ng/mL.  His ECOG score is 0.  His pain score is 0/10.     The patient previously  developed a single metastatic recurrence involving a right iliac lymph node, without evidence of additional local or distant metastatic deposits.  On 4/29/2022, he underwent successful surgical metastasectomy which confirmed metastatic prostatic adenocarcinoma.  Following resection, his PSA level dropped from 1.59 ng/mL down to 0.32 ng/mL.  However, the current PSA is back up to 1.21 ng/mL.  Since his PSA level has risen above 1.0 ng/mL, we decided to obtain another PSMA-PET scan on 5/30/23.  Unfortunately, his the PSMA scan suggested peritoneal implants which are not common in prostate cancer, but these could certainly explain his rising PSA level.  I had presented him with several options at that time. At the end of our prior conversations, we had decided to begin relugolix.  He began this treatment on 6/14/2023.  I had planned to treat him with 6-12 months of relugolix therapy, followed by a potential cessation of therapy if he achieves a complete PSA response.    However after stopping ADT late last year, his PSA level maddy back up to 0.95 ng/mL by 1/4/2024.  At that time, I had discussed several options either involving ADT alone, or the combination of ADT plus enzalutamide consistent with the recent published results of the EMBARK study.  In addition, he could consider enzalutamide monotherapy, as per the third arm of the EMBARK study.  The patient did have an interesting genomic profile with a high-genome wide PARISH score (gLOH 21%), and thus he may potentially benefit from treatment using a PARP inhibitory such as olaparib.  However, the use of olaparib was declined by his insurance.  Thus, he elected to go back on relugolix in 1/2024, which has resulted in another PSA decline (0.95 --> 0.05 ng/mL).  He is tolerating the relugolix generally well, except for weight gain, glucose intolerance, tinnitus, and hypertension.  We had planned to treat him with relugolix for a duration of one year (until 1/2025).  He is  comfortable stopping the ADT at this time.  His next PSA will be in 3/2025, with a video visit shortly thereafter.  We will obtain the next PSMA-PET scan when the PSA level reaches 0.5 ng/mL.    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  As discussed above, he has a high gLOH score of 21% which greatly increases his odds of success with PARP inhibitor treatment.  I have decided to prescribe olaparib 300 mg BID today.  We will await insurance authorization before dispensing this.     A total of 40 minutes were spent on this patient on the date of the encounter conducting chart review, review of test results, interpretation of tests, patient visit, documentation and discussion with other provider(s).  The patient was given the opportunity to ask multiple questions today, all of which were answered to his satisfaction.     Al Morocho M.D.      Again, thank you for allowing me to participate in the care of your patient.        Sincerely,        Al Morocho MD    Electronically signed

## 2025-01-25 ENCOUNTER — DOCUMENTATION ONLY (OUTPATIENT)
Dept: ONCOLOGY | Facility: CLINIC | Age: 60
End: 2025-01-25
Payer: COMMERCIAL

## 2025-01-25 NOTE — PROGRESS NOTES
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  I have copied and pasted the relevant sections of his [facility] report below.  Thus, he is potentially a candidate for PARP inhibitor treatment, e.g. with olaparib.              Al Mroocho M.D.  Professor of Oncology  [facility]

## 2025-01-27 ENCOUNTER — PATIENT OUTREACH (OUTPATIENT)
Dept: ONCOLOGY | Facility: CLINIC | Age: 60
End: 2025-01-27
Payer: COMMERCIAL

## 2025-01-27 ENCOUNTER — TELEPHONE (OUTPATIENT)
Dept: ONCOLOGY | Facility: CLINIC | Age: 60
End: 2025-01-27
Payer: COMMERCIAL

## 2025-01-27 NOTE — TELEPHONE ENCOUNTER
COPAY CARD OBTAINED    Medication: LYNPARZA 150 MG PO TABS  Expected Copay: $    Copay card Monthly Max Amount: $    Copay card Annual Amount: $ 26,000        Mey Cortez [facility] [address] [address]  Office: [phone]  Fax: [phone]  [email or web address]

## 2025-01-27 NOTE — TELEPHONE ENCOUNTER
Prior Authorization Approval    Medication: LYNPARZA 150 MG PO TABS  Authorization Effective Date:    Authorization Expiration Date:    Approved Dose/Quantity:   Reference #:     Insurance Company: [email or web address] - Phone [phone] Fax [phone]  Expected CoPay: $    CoPay Card Available: Yes    Financial Assistance Needed:   Which Pharmacy is filling the prescription: [facility] - [address]  Pharmacy Notified:   Patient Notified:         Mey Cortez CPhTOn[facility]olog[facility] Pharmacy Liaison     [facility]  [address] [place] [address]  Office: [phone]  Fax: [phone]  [email or web address]

## 2025-01-27 NOTE — PROGRESS NOTES
[facility]: Cancer Care                                                                                      Summer from [employer] insurance called to say that Lynparza has been approved for this patient.    Rx: [id number]  Approved from 12/27/24- 07/27/25    The pharmacy approval line is [phone].    Noris Sweet, RN, BSN, OCN  Oncology RN Care Coordinator  [facility]

## 2025-01-28 ENCOUNTER — TELEPHONE (OUTPATIENT)
Dept: ONCOLOGY | Facility: CLINIC | Age: 60
End: 2025-01-28
Payer: COMMERCIAL

## 2025-01-28 DIAGNOSIS — C61 PROSTATE CANCER (H): Primary | ICD-10-CM

## 2025-01-28 RX ORDER — BUPROPION HYDROCHLORIDE 150 MG/1
150 TABLET, EXTENDED RELEASE ORAL 2 TIMES DAILY
COMMUNITY
Start: 2025-01-28

## 2025-01-28 RX ORDER — ONDANSETRON 8 MG/1
8 TABLET, FILM COATED ORAL EVERY 8 HOURS PRN
Qty: 30 TABLET | Refills: 2 | Status: SHIPPED | OUTPATIENT
Start: 2025-01-28

## 2025-01-28 NOTE — ORAL ONC MGMT
Oral Chemotherapy Monitoring Program     Placed call to patient in follow up of oral chemotherapy. New teach on Lynparza. Left message requesting call back. No drug names were mentioned. Will update when response received.     Isatu Lanza, MariannD  Oral Chemotherapy Monitoring Program  [facility]  [phone]

## 2025-01-28 NOTE — ORAL ONC MGMT
"Oral Chemotherapy Monitoring Program    Lab Monitoring Plan    Labs drawn outside of [place]: n/a  Subjective/Objective:  Duglas Gao is a 59 year old male contacted by phone for an initial visit for oral chemotherapy education. Reviewed dosing, frequency, food interactions, what to do if you miss a dose, and side effects.     Will send antiemetic Zofran to           12/20/2023     4:00 PM 1/8/2024     9:00 AM 1/8/2024    10:00 AM 1/22/2025     9:00 AM 1/28/2025     9:00 AM   ORAL CHEMOTHERAPY   Assessment Type Initial Work up Initial Work up Other;Refill Initial Work up    Diagnosis Code Prostate Cancer Prostate Cancer Prostate Cancer Prostate Cancer Prostate Cancer   Providers Bailee Morocho   [facility]   Is this patient followed by the [facility] OC team? No No No No No   Drug Name Lynparza (olaparib) Orgovyx (relugolix) Orgovyx (relugolix) Lynparza (olaparib) Lynparza (olaparib)   Dose 300 mg 120 mg 120 mg 300 mg 300 mg   Current Schedule BID Daily Daily BID BID   Cycle Details Continuous Continuous Continuous Continuous Continuous   Any new drug interactions? No No No     Is the dose as ordered appropriate for the patient?  No Yes     Pharmacist intervention for dose adjustment?  Yes      Intervention(s)  Dose clarified/confermed with MD          Last PHQ-2 Score on record:       6/8/2023     1:12 PM 4/19/2022     7:39 AM   PHQ-2 ( 1999 Pfizer)   Q1: Little interest or pleasure in doing things 1 0   Q2: Feeling down, depressed or hopeless 1 0   PHQ-2 Score 2 0       Vitals:  BP:   BP Readings from Last 1 Encounters:   01/22/25 136/82     Wt Readings from Last 1 Encounters:   01/22/25 102.1 kg (225 lb)     Estimated body surface area is 2.27 meters squared as calculated from the following:    Height as of 1/22/25: 1.816 m (5' 11.5\").    Weight as of 1/22/25: 102.1 kg (225 lb).    Labs:  _  Result Component Current " Result Ref Range   Sodium 140 (1/20/2025) 135 - 145 mmol/L     _  Result Component Current Result Ref Range   Potassium 4.2 (1/20/2025) 3.4 - 5.3 mmol/L     _  Result Component Current Result Ref Range   Calcium 10.1 (1/20/2025) 8.8 - 10.4 mg/dL     No results found for Mag within last 30 days.     No results found for Phos within last 30 days.     _  Result Component Current Result Ref Range   Albumin 4.7 (1/20/2025) 3.5 - 5.2 g/dL     _  Result Component Current Result Ref Range   Urea Nitrogen 12.3 (1/20/2025) 8.0 - 23.0 mg/dL     _  Result Component Current Result Ref Range   Creatinine 1.04 (1/20/2025) 0.67 - 1.17 mg/dL     _  Result Component Current Result Ref Range   AST 33 (1/20/2025) 0 - 45 U/L     _  Result Component Current Result Ref Range   ALT 57 (1/20/2025) 0 - 70 U/L     _  Result Component Current Result Ref Range   Bilirubin Total 0.4 (1/20/2025) <=1.2 mg/dL     _  Result Component Current Result Ref Range   WBC Count 5.2 (1/20/2025) 4.0 - 11.0 10e3/uL     _  Result Component Current Result Ref Range   Hemoglobin 14.6 (1/20/2025) 13.3 - 17.7 g/dL     _  Result Component Current Result Ref Range   Platelet Count 169 (1/20/2025) 150 - 450 10e3/uL     _  Result Component Current Result Ref Range   Absolute Neutrophils 2.7 (1/20/2025) 1.6 - 8.3 10e3/uL        Assessment:  Patient is appropriate to start therapy.    Plan:  Basic chemotherapy teaching was reviewed with {OCeducationrecdropdown:843851} including indication, start date of therapy, dose, administration, adverse effects, missed doses, food and drug interactions, monitoring, side effect management, office contact information, and safe handling. Written materials were provided and all questions answered.    Follow-Up:  1-week after starting olaparib     Isatu Lanza PharmD  Oral Chemotherapy Monitoring Program  [facility]  [phone]

## 2025-01-28 NOTE — ORAL ONC MGMT
Oral Chemotherapy Monitoring Program    Lab Monitoring Plan    Labs drawn outside of [place]: n/a  Subjective/Objective:  Duglas Gao is a 59 year old male contacted by phone for an initial visit for oral chemotherapy education. Reviewed dosing, frequency, what to do if you miss a dose, and side effects.     Patient has an upcoming visit with Dr Maynard (his PMD) to discuss his Wellbutrin along with possiblye starting Ozempic. No drug-drug interactions found between Ozempic & his OC olaparib, or with his antiemetic ondansetron.     Patient voiced concern about how many side effects there are associated with olaparib. Encouraged him multiple times to reach out to us via phone or via [facility]. Patient does have baseline joint pain in his elbows which has been ongoing since starting Orgovyx. He does not use pain medication for the elbow pain, except he will use ibuprofen once in awhile.    Patient has 6 doses left of Orgovyx. Once done with Orgovyx, patient will start olaparib. Patient receives Orgovyx from [facility], who will be supplying the olaparib. Order released to [facility], and ondansetron rx sent to patient's local [facility].        12/20/2023     4:00 PM 1/8/2024     9:00 AM 1/8/2024    10:00 AM 1/22/2025     9:00 AM 1/28/2025     9:00 AM 1/28/2025    11:00 AM   ORAL CHEMOTHERAPY   Assessment Type Initial Work up Initial Work up Other;Refill Initial Work up Left Voicemail New Teach   Diagnosis Code Prostate Cancer Prostate Cancer Prostate Cancer Prostate Cancer Prostate Cancer Prostate Cancer   Providers Bailee Morocho   [facility]   Is this patient followed by the [facility] OC team? No No No No No No   Drug Name Lynparza (olaparib) Orgovyx (relugolix) Orgovyx (relugolix) Lynparza (olaparib) Lynparza (olaparib) Lynparza (olaparib)   Dose 300 mg 120 mg 120 mg 300 mg 300 mg 300 mg   Current Schedule BID  "Daily Daily BID BID BID   Cycle Details Continuous Continuous Continuous Continuous Continuous Continuous   Planned next cycle start date      1/30/2025   Any new drug interactions? No No No   No   Is the dose as ordered appropriate for the patient?  No Yes   Yes   Pharmacist intervention for dose adjustment?  Yes       Intervention(s)  Dose clarified/confermed with MD           Last PHQ-2 Score on record:       6/8/2023     1:12 PM 4/19/2022     7:39 AM   PHQ-2 ( 1999 Pfizer)   Q1: Little interest or pleasure in doing things 1 0   Q2: Feeling down, depressed or hopeless 1 0   PHQ-2 Score 2 0       Vitals:  BP:   BP Readings from Last 1 Encounters:   01/22/25 136/82     Wt Readings from Last 1 Encounters:   01/22/25 102.1 kg (225 lb)     Estimated body surface area is 2.27 meters squared as calculated from the following:    Height as of 1/22/25: 1.816 m (5' 11.5\").    Weight as of 1/22/25: 102.1 kg (225 lb).    Labs:  _  Result Component Current Result Ref Range   Sodium 140 (1/20/2025) 135 - 145 mmol/L     _  Result Component Current Result Ref Range   Potassium 4.2 (1/20/2025) 3.4 - 5.3 mmol/L     _  Result Component Current Result Ref Range   Calcium 10.1 (1/20/2025) 8.8 - 10.4 mg/dL     No results found for Mag within last 30 days.     No results found for Phos within last 30 days.     _  Result Component Current Result Ref Range   Albumin 4.7 (1/20/2025) 3.5 - 5.2 g/dL     _  Result Component Current Result Ref Range   Urea Nitrogen 12.3 (1/20/2025) 8.0 - 23.0 mg/dL     _  Result Component Current Result Ref Range   Creatinine 1.04 (1/20/2025) 0.67 - 1.17 mg/dL     _  Result Component Current Result Ref Range   AST 33 (1/20/2025) 0 - 45 U/L     _  Result Component Current Result Ref Range   ALT 57 (1/20/2025) 0 - 70 U/L     _  Result Component Current Result Ref Range   Bilirubin Total 0.4 (1/20/2025) <=1.2 mg/dL     _  Result Component Current Result Ref Range   WBC Count 5.2 (1/20/2025) 4.0 - 11.0 10e3/uL "     _  Result Component Current Result Ref Range   Hemoglobin 14.6 (1/20/2025) 13.3 - 17.7 g/dL     _  Result Component Current Result Ref Range   Platelet Count 169 (1/20/2025) 150 - 450 10e3/uL     No results found for ANC within last 30 days.     _  Result Component Current Result Ref Range   Absolute Neutrophils 2.7 (1/20/2025) 1.6 - 8.3 10e3/uL        Assessment:  Patient is appropriate to start therapy.    Plan:  Basic chemotherapy teaching was reviewed with the patient including indication, start date of therapy, dose, administration, adverse effects, missed doses, food and drug interactions, monitoring, side effect management, office contact information, and safe handling. Written materials were provided and all questions answered.    Follow-Up:  1 week after starting to assess tolerability     Isatu Lanza PharmD  Oral Chemotherapy Monitoring Program  [facility]  [phone]

## 2025-02-06 ENCOUNTER — TELEPHONE (OUTPATIENT)
Dept: ONCOLOGY | Facility: CLINIC | Age: 60
End: 2025-02-06
Payer: COMMERCIAL

## 2025-02-06 NOTE — ORAL ONC MGMT
Oral Chemotherapy Monitoring Program     Placed call to patient in follow up of olaparib therapy.     No answer, voicemail not set up yet.  Sent [facility] message to please call back in follow-up of therapy.    Thank you,  Ashvin Eubanks, PharmD  Oral Chemotherapy Monitoring Program - Skin/Sarcoma/  [phone]

## 2025-02-10 ENCOUNTER — TELEPHONE (OUTPATIENT)
Dept: ONCOLOGY | Facility: CLINIC | Age: 60
End: 2025-02-10
Payer: COMMERCIAL

## 2025-02-17 ENCOUNTER — MYC MEDICAL ADVICE (OUTPATIENT)
Dept: ONCOLOGY | Facility: CLINIC | Age: 60
End: 2025-02-17

## 2025-02-17 ENCOUNTER — LAB (OUTPATIENT)
Dept: LAB | Facility: CLINIC | Age: 60
End: 2025-02-17
Payer: COMMERCIAL

## 2025-02-17 DIAGNOSIS — C61 MALIGNANT NEOPLASM OF PROSTATE (H): ICD-10-CM

## 2025-02-17 DIAGNOSIS — C61 PROSTATE CANCER (H): ICD-10-CM

## 2025-02-17 LAB
ALBUMIN SERPL BCG-MCNC: 4.5 G/DL (ref 3.5–5.2)
ALP SERPL-CCNC: 96 U/L (ref 40–150)
ALT SERPL W P-5'-P-CCNC: 52 U/L (ref 0–70)
ANION GAP SERPL CALCULATED.3IONS-SCNC: 10 MMOL/L (ref 7–15)
AST SERPL W P-5'-P-CCNC: 33 U/L (ref 0–45)
BASOPHILS # BLD AUTO: 0.1 10E3/UL (ref 0–0.2)
BASOPHILS NFR BLD AUTO: 1 %
BILIRUB SERPL-MCNC: 0.4 MG/DL
BUN SERPL-MCNC: 10.6 MG/DL (ref 8–23)
CALCIUM SERPL-MCNC: 10 MG/DL (ref 8.8–10.4)
CHLORIDE SERPL-SCNC: 104 MMOL/L (ref 98–107)
CREAT SERPL-MCNC: 1.34 MG/DL (ref 0.67–1.17)
EGFRCR SERPLBLD CKD-EPI 2021: 60 ML/MIN/1.73M2
EOSINOPHIL # BLD AUTO: 0.3 10E3/UL (ref 0–0.7)
EOSINOPHIL NFR BLD AUTO: 5 %
ERYTHROCYTE [DISTWIDTH] IN BLOOD BY AUTOMATED COUNT: 12.9 % (ref 10–15)
GLUCOSE SERPL-MCNC: 100 MG/DL (ref 70–99)
HCO3 SERPL-SCNC: 25 MMOL/L (ref 22–29)
HCT VFR BLD AUTO: 41.3 % (ref 40–53)
HGB BLD-MCNC: 14.2 G/DL (ref 13.3–17.7)
IMM GRANULOCYTES # BLD: 0.1 10E3/UL
IMM GRANULOCYTES NFR BLD: 1 %
LYMPHOCYTES # BLD AUTO: 1.3 10E3/UL (ref 0.8–5.3)
LYMPHOCYTES NFR BLD AUTO: 26 %
MCH RBC QN AUTO: 30.1 PG (ref 26.5–33)
MCHC RBC AUTO-ENTMCNC: 34.4 G/DL (ref 31.5–36.5)
MCV RBC AUTO: 88 FL (ref 78–100)
MONOCYTES # BLD AUTO: 0.3 10E3/UL (ref 0–1.3)
MONOCYTES NFR BLD AUTO: 6 %
NEUTROPHILS # BLD AUTO: 3.1 10E3/UL (ref 1.6–8.3)
NEUTROPHILS NFR BLD AUTO: 61 %
NRBC # BLD AUTO: 0 10E3/UL
NRBC BLD AUTO-RTO: 1 /100
PLATELET # BLD AUTO: 173 10E3/UL (ref 150–450)
POTASSIUM SERPL-SCNC: 4.5 MMOL/L (ref 3.4–5.3)
PROT SERPL-MCNC: 7.2 G/DL (ref 6.4–8.3)
PSA SERPL DL<=0.01 NG/ML-MCNC: 0.28 NG/ML (ref 0–4.5)
RBC # BLD AUTO: 4.72 10E6/UL (ref 4.4–5.9)
SODIUM SERPL-SCNC: 139 MMOL/L (ref 135–145)
WBC # BLD AUTO: 5.1 10E3/UL (ref 4–11)

## 2025-02-17 PROCEDURE — 36415 COLL VENOUS BLD VENIPUNCTURE: CPT | Performed by: PATHOLOGY

## 2025-02-17 PROCEDURE — 84403 ASSAY OF TOTAL TESTOSTERONE: CPT | Performed by: INTERNAL MEDICINE

## 2025-02-17 PROCEDURE — 84153 ASSAY OF PSA TOTAL: CPT | Performed by: PATHOLOGY

## 2025-02-17 PROCEDURE — 80053 COMPREHEN METABOLIC PANEL: CPT | Performed by: PATHOLOGY

## 2025-02-17 PROCEDURE — 99000 SPECIMEN HANDLING OFFICE-LAB: CPT | Performed by: PATHOLOGY

## 2025-02-17 PROCEDURE — 85025 COMPLETE CBC W/AUTO DIFF WBC: CPT | Performed by: PATHOLOGY

## 2025-02-17 NOTE — ORAL ONC MGMT
Oral Chemotherapy Monitoring Program  Lab Follow Up    Reviewed lab results from 2/17.        1/8/2024    10:00 AM 1/22/2025     9:00 AM 1/28/2025     9:00 AM 1/28/2025    11:00 AM 2/6/2025    10:00 AM 2/10/2025     8:00 AM 2/17/2025     3:00 PM   ORAL CHEMOTHERAPY   Assessment Type Other;Refill Initial Work up Left Voicemail New Teach;Refill Left Voicemail Initial Follow up Lab Monitoring   Diagnosis Code Prostate Cancer Prostate Cancer Prostate Cancer Prostate Cancer Prostate Cancer Prostate Cancer Prostate Cancer   Providers Bailee Morocho   [facility]   Is this patient followed by the [facility] OC team? No No No No No No No   Drug Name Orgovyx (relugolix) Lynparza (olaparib) Lynparza (olaparib) Lynparza (olaparib) Lynparza (olaparib) Lynparza (olaparib) Lynparza (olaparib)   Dose 120 mg 300 mg 300 mg 300 mg 300 mg 300 mg 300 mg   Current Schedule Daily BID BID BID BID BID BID   Cycle Details Continuous Continuous Continuous Continuous Continuous Continuous Continuous   Start Date of Last Cycle      2/4/2025    Planned next cycle start date    1/30/2025      Doses missed in last 2 weeks      0    Adherence Assessment      Adherent    Adverse Effects      Nausea;Diarrhea;Decreased Appetite/Weight Loss    Nausea      Grade 1    Pharmacist Intervention(nausea)      No    Diarrhea      Grade 1    Pharmacist Intervention(diarrhea)      No    Decrease Appetite/Weight Loss      Grade 1    Pharmacist Intervention(decreased appetite/weight loss)      No    Any new drug interactions? No   No  No    Is the dose as ordered appropriate for the patient? Yes   Yes  Yes    Has the patient missed any days of school, work, or other routine activity?      No    Since the last time we talked, have you been hospitalized or used the emergency room?      No    Was a medication prescribed as part of a CPA?     No  No        Labs:  _  Result Component Current Result Ref Range   Sodium 139 (2/17/2025) 135 - 145 mmol/L     _  Result Component Current Result Ref Range   Potassium 4.5 (2/17/2025) 3.4 - 5.3 mmol/L     _  Result Component Current Result Ref Range   Calcium 10.0 (2/17/2025) 8.8 - 10.4 mg/dL     No results found for Mag within last 30 days.     No results found for Phos within last 30 days.     _  Result Component Current Result Ref Range   Albumin 4.5 (2/17/2025) 3.5 - 5.2 g/dL     _  Result Component Current Result Ref Range   Urea Nitrogen 10.6 (2/17/2025) 8.0 - 23.0 mg/dL     _  Result Component Current Result Ref Range   Creatinine 1.34 (H) (2/17/2025) 0.67 - 1.17 mg/dL     _  Result Component Current Result Ref Range   AST 33 (2/17/2025) 0 - 45 U/L     _  Result Component Current Result Ref Range   ALT 52 (2/17/2025) 0 - 70 U/L     _  Result Component Current Result Ref Range   Bilirubin Total 0.4 (2/17/2025) <=1.2 mg/dL     _  Result Component Current Result Ref Range   WBC Count 5.1 (2/17/2025) 4.0 - 11.0 10e3/uL     _  Result Component Current Result Ref Range   Hemoglobin 14.2 (2/17/2025) 13.3 - 17.7 g/dL     _  Result Component Current Result Ref Range   Platelet Count 173 (2/17/2025) 150 - 450 10e3/uL     No results found for ANC within last 30 days.     _  Result Component Current Result Ref Range   Absolute Neutrophils 3.1 (2/17/2025) 1.6 - 8.3 10e3/uL        Assessment & Plan:  No concerning abnormalities.  Creatinine has risen from baseline, but this is very common with olaparib therapy and likely does not compromise kidney function.    [facility] message sent to patient.    Follow-Up:  Labs 3/17    Thank you,  Ashvin Eubanks, PharmD  Oral Chemotherapy Monitoring Program - Skin/Sarcoma/  [phone]

## 2025-02-21 LAB — TESTOST SERPL-MCNC: 244 NG/DL (ref 240–950)

## 2025-02-27 DIAGNOSIS — C61 PROSTATE CANCER (H): Primary | ICD-10-CM

## 2025-03-17 ENCOUNTER — LAB (OUTPATIENT)
Dept: LAB | Facility: CLINIC | Age: 60
End: 2025-03-17
Payer: COMMERCIAL

## 2025-03-17 DIAGNOSIS — C61 MALIGNANT NEOPLASM OF PROSTATE (H): ICD-10-CM

## 2025-03-17 DIAGNOSIS — C61 PROSTATE CANCER (H): ICD-10-CM

## 2025-03-17 LAB
ALBUMIN SERPL BCG-MCNC: 4.5 G/DL (ref 3.5–5.2)
ALP SERPL-CCNC: 91 U/L (ref 40–150)
ALT SERPL W P-5'-P-CCNC: 56 U/L (ref 0–70)
ANION GAP SERPL CALCULATED.3IONS-SCNC: 10 MMOL/L (ref 7–15)
AST SERPL W P-5'-P-CCNC: 31 U/L (ref 0–45)
BASOPHILS # BLD AUTO: 0 10E3/UL (ref 0–0.2)
BASOPHILS NFR BLD AUTO: 1 %
BILIRUB SERPL-MCNC: 0.4 MG/DL
BUN SERPL-MCNC: 10.7 MG/DL (ref 8–23)
CALCIUM SERPL-MCNC: 10.2 MG/DL (ref 8.8–10.4)
CHLORIDE SERPL-SCNC: 104 MMOL/L (ref 98–107)
CREAT SERPL-MCNC: 1.17 MG/DL (ref 0.67–1.17)
EGFRCR SERPLBLD CKD-EPI 2021: 71 ML/MIN/1.73M2
EOSINOPHIL # BLD AUTO: 0.1 10E3/UL (ref 0–0.7)
EOSINOPHIL NFR BLD AUTO: 2 %
ERYTHROCYTE [DISTWIDTH] IN BLOOD BY AUTOMATED COUNT: 14.9 % (ref 10–15)
GLUCOSE SERPL-MCNC: 93 MG/DL (ref 70–99)
HCO3 SERPL-SCNC: 26 MMOL/L (ref 22–29)
HCT VFR BLD AUTO: 41 % (ref 40–53)
HGB BLD-MCNC: 13.9 G/DL (ref 13.3–17.7)
IMM GRANULOCYTES # BLD: 0 10E3/UL
IMM GRANULOCYTES NFR BLD: 1 %
LYMPHOCYTES # BLD AUTO: 1.3 10E3/UL (ref 0.8–5.3)
LYMPHOCYTES NFR BLD AUTO: 25 %
MCH RBC QN AUTO: 30.8 PG (ref 26.5–33)
MCHC RBC AUTO-ENTMCNC: 33.9 G/DL (ref 31.5–36.5)
MCV RBC AUTO: 91 FL (ref 78–100)
MONOCYTES # BLD AUTO: 0.3 10E3/UL (ref 0–1.3)
MONOCYTES NFR BLD AUTO: 6 %
NEUTROPHILS # BLD AUTO: 3.5 10E3/UL (ref 1.6–8.3)
NEUTROPHILS NFR BLD AUTO: 66 %
NRBC # BLD AUTO: 0 10E3/UL
NRBC BLD AUTO-RTO: 0 /100
PLATELET # BLD AUTO: 173 10E3/UL (ref 150–450)
POTASSIUM SERPL-SCNC: 4.6 MMOL/L (ref 3.4–5.3)
PROT SERPL-MCNC: 7.2 G/DL (ref 6.4–8.3)
PSA SERPL DL<=0.01 NG/ML-MCNC: 0.72 NG/ML (ref 0–4.5)
RBC # BLD AUTO: 4.52 10E6/UL (ref 4.4–5.9)
SODIUM SERPL-SCNC: 140 MMOL/L (ref 135–145)
WBC # BLD AUTO: 5.3 10E3/UL (ref 4–11)

## 2025-03-17 PROCEDURE — 80053 COMPREHEN METABOLIC PANEL: CPT | Performed by: PATHOLOGY

## 2025-03-17 PROCEDURE — 36415 COLL VENOUS BLD VENIPUNCTURE: CPT | Performed by: PATHOLOGY

## 2025-03-17 PROCEDURE — 84403 ASSAY OF TOTAL TESTOSTERONE: CPT | Performed by: INTERNAL MEDICINE

## 2025-03-17 PROCEDURE — 85025 COMPLETE CBC W/AUTO DIFF WBC: CPT | Performed by: PATHOLOGY

## 2025-03-17 PROCEDURE — 99000 SPECIMEN HANDLING OFFICE-LAB: CPT | Performed by: PATHOLOGY

## 2025-03-17 PROCEDURE — 84153 ASSAY OF PSA TOTAL: CPT | Performed by: PATHOLOGY

## 2025-03-18 ENCOUNTER — TELEPHONE (OUTPATIENT)
Dept: ONCOLOGY | Facility: CLINIC | Age: 60
End: 2025-03-18
Payer: COMMERCIAL

## 2025-03-18 NOTE — ORAL ONC MGMT
"Oral Chemotherapy Monitoring Program    Subjective/Objective:  Duglas Gao is a 59 year old male contacted by phone for a follow-up visit for oral chemotherapy.  Carlos endorses taking olaparib 300 mg twice daily.  States his fatigue is still very persistent - makes getting up and going difficult on a daily basis.  Bowel movements are erratic, though patient also attributes this to new GLP-1 therapy started 5 or 6 weeks ago.  Patient reports appetite is \"so-so\", still manages good oral intake.  Finally, reports intermittent nausea; has to use ondansetron every 2-3 days.  Nausea often pops up during the night.  Patient reports no missed doses.    Patient inquires about rising PSA and efficacy of olaparib - informed Carlos that PSA is still a measure of prostate cancer suppression, and PSA is still relatively suppressed.  Encouraged him to speak with Dr. Morocho more about details and goals of therapy during his scheduled appointment on 3/24.    Of note, patient also informs us he has stopped bupropion ~10 days ago due to it being a possible tinnitus cause.  It has not made a major difference thus far.        1/28/2025     9:00 AM 1/28/2025    11:00 AM 2/6/2025    10:00 AM 2/10/2025     8:00 AM 2/17/2025     3:00 PM 2/27/2025    12:00 PM 3/18/2025    11:00 AM   ORAL CHEMOTHERAPY   Assessment Type Left Voicemail New Teach;Refill Left Voicemail Initial Follow up Lab Monitoring Refill Lab Monitoring;Monthly Follow up   Diagnosis Code Prostate Cancer Prostate Cancer Prostate Cancer Prostate Cancer Prostate Cancer Prostate Cancer Prostate Cancer   Providers Bailee Morocho   [facility]   Is this patient followed by the [facility] OC team? No No No No No No No   Drug Name Lynparza (olaparib) Lynparza (olaparib) Lynparza (olaparib) Lynparza (olaparib) Lynparza (olaparib) Lynparza (olaparib) " "Lynparza (olaparib)   Dose 300 mg 300 mg 300 mg 300 mg 300 mg 300 mg 300 mg   Current Schedule BID BID BID BID BID BID BID   Cycle Details Continuous Continuous Continuous Continuous Continuous Continuous Continuous   Start Date of Last Cycle    2/4/2025      Planned next cycle start date  1/30/2025        Doses missed in last 2 weeks    0   0   Adherence Assessment    Adherent   Adherent   Adverse Effects    Nausea;Diarrhea;Decreased Appetite/Weight Loss   Nausea;Diarrhea;Fatigue;Decreased Appetite/Weight Loss   Nausea    Grade 1   Grade 1   Pharmacist Intervention(nausea)    No   No   Diarrhea    Grade 1   Grade 1   Pharmacist Intervention(diarrhea)    No   Yes   Intervention(s)       Patient education   Decrease Appetite/Weight Loss    Grade 1   Grade 1   Pharmacist Intervention(decreased appetite/weight loss)    No   Yes   Intervention(s)       Patient education   Fatigue       Grade 1   Pharmacist Intervention(fatigue)       Yes   Intervention(s)       Patient education   Any new drug interactions?  No  No      Is the dose as ordered appropriate for the patient?  Yes  Yes      Has the patient missed any days of school, work, or other routine activity?    No      Since the last time we talked, have you been hospitalized or used the emergency room?    No      Was a medication prescribed as part of a CPA?  No  No          Last PHQ-2 Score on record:       6/8/2023     1:12 PM 4/19/2022     7:39 AM   PHQ-2 ( 1999 Pfizer)   Q1: Little interest or pleasure in doing things 1 0   Q2: Feeling down, depressed or hopeless 1 0   PHQ-2 Score 2 0       Vitals:  BP:   BP Readings from Last 1 Encounters:   01/22/25 136/82     Wt Readings from Last 1 Encounters:   01/22/25 102.1 kg (225 lb)     Estimated body surface area is 2.27 meters squared as calculated from the following:    Height as of 1/22/25: 1.816 m (5' 11.5\").    Weight as of 1/22/25: 102.1 kg (225 lb).    Labs:  _  Result Component Current Result Ref Range   Sodium " 140 (3/17/2025) 135 - 145 mmol/L     _  Result Component Current Result Ref Range   Potassium 4.6 (3/17/2025) 3.4 - 5.3 mmol/L     _  Result Component Current Result Ref Range   Calcium 10.2 (3/17/2025) 8.8 - 10.4 mg/dL     No results found for Mag within last 30 days.     No results found for Phos within last 30 days.     _  Result Component Current Result Ref Range   Albumin 4.5 (3/17/2025) 3.5 - 5.2 g/dL     _  Result Component Current Result Ref Range   Urea Nitrogen 10.7 (3/17/2025) 8.0 - 23.0 mg/dL     _  Result Component Current Result Ref Range   Creatinine 1.17 (3/17/2025) 0.67 - 1.17 mg/dL     _  Result Component Current Result Ref Range   AST 31 (3/17/2025) 0 - 45 U/L     _  Result Component Current Result Ref Range   ALT 56 (3/17/2025) 0 - 70 U/L     _  Result Component Current Result Ref Range   Bilirubin Total 0.4 (3/17/2025) <=1.2 mg/dL     _  Result Component Current Result Ref Range   WBC Count 5.3 (3/17/2025) 4.0 - 11.0 10e3/uL     _  Result Component Current Result Ref Range   Hemoglobin 13.9 (3/17/2025) 13.3 - 17.7 g/dL     _  Result Component Current Result Ref Range   Platelet Count 173 (3/17/2025) 150 - 450 10e3/uL     No results found for ANC within last 30 days.     _  Result Component Current Result Ref Range   Absolute Neutrophils 3.5 (3/17/2025) 1.6 - 8.3 10e3/uL          Assessment/Plan:  Several adverse effects present, but overall manageable with current interventions per patient.  Continue olaparib as planned.    Follow-Up:  Appt 3/24    Refill Due:  ~4/5    Thank you,  Ashvin Eubanks, PharmD  Oral Chemotherapy Monitoring Program - Skin/Sarcoma/  [phone]      Oral Chemotherapy Monitoring Program  Lab Follow Up    Reviewed lab results from 3/17..        1/28/2025     9:00 AM 1/28/2025    11:00 AM 2/6/2025    10:00 AM 2/10/2025     8:00 AM 2/17/2025     3:00 PM 2/27/2025    12:00 PM 3/18/2025    11:00 AM   ORAL CHEMOTHERAPY   Assessment Type Left Voicemail New Teach;Refill Left  Voicemail Initial Follow up Lab Monitoring Refill Lab Monitoring;Monthly Follow up   Diagnosis Code Prostate Cancer Prostate Cancer Prostate Cancer Prostate Cancer Prostate Cancer Prostate Cancer Prostate Cancer   Providers Bailee Morocho   [facility]   Is this patient followed by the [facility] OC team? No No No No No No No   Drug Name Lynparza (olaparib) Lynparza (olaparib) Lynparza (olaparib) Lynparza (olaparib) Lynparza (olaparib) Lynparza (olaparib) Lynparza (olaparib)   Dose 300 mg 300 mg 300 mg 300 mg 300 mg 300 mg 300 mg   Current Schedule BID BID BID BID BID BID BID   Cycle Details Continuous Continuous Continuous Continuous Continuous Continuous Continuous   Start Date of Last Cycle    2/4/2025      Planned next cycle start date  1/30/2025        Doses missed in last 2 weeks    0   0   Adherence Assessment    Adherent   Adherent   Adverse Effects    Nausea;Diarrhea;Decreased Appetite/Weight Loss   Nausea;Diarrhea;Fatigue;Decreased Appetite/Weight Loss   Nausea    Grade 1   Grade 1   Pharmacist Intervention(nausea)    No   No   Diarrhea    Grade 1   Grade 1   Pharmacist Intervention(diarrhea)    No   Yes   Intervention(s)       Patient education   Decrease Appetite/Weight Loss    Grade 1   Grade 1   Pharmacist Intervention(decreased appetite/weight loss)    No   Yes   Intervention(s)       Patient education   Fatigue       Grade 1   Pharmacist Intervention(fatigue)       Yes   Intervention(s)       Patient education   Any new drug interactions?  No  No      Is the dose as ordered appropriate for the patient?  Yes  Yes      Has the patient missed any days of school, work, or other routine activity?    No      Since the last time we talked, have you been hospitalized or used the emergency room?    No      Was a medication prescribed as part of a CPA?  No  No           Labs:  _  Result Component Current Result Ref Range   Sodium 140 (3/17/2025) 135 - 145 mmol/L     _  Result Component Current Result Ref Range   Potassium 4.6 (3/17/2025) 3.4 - 5.3 mmol/L     _  Result Component Current Result Ref Range   Calcium 10.2 (3/17/2025) 8.8 - 10.4 mg/dL     No results found for Mag within last 30 days.     No results found for Phos within last 30 days.     _  Result Component Current Result Ref Range   Albumin 4.5 (3/17/2025) 3.5 - 5.2 g/dL     _  Result Component Current Result Ref Range   Urea Nitrogen 10.7 (3/17/2025) 8.0 - 23.0 mg/dL     _  Result Component Current Result Ref Range   Creatinine 1.17 (3/17/2025) 0.67 - 1.17 mg/dL     _  Result Component Current Result Ref Range   AST 31 (3/17/2025) 0 - 45 U/L     _  Result Component Current Result Ref Range   ALT 56 (3/17/2025) 0 - 70 U/L     _  Result Component Current Result Ref Range   Bilirubin Total 0.4 (3/17/2025) <=1.2 mg/dL     _  Result Component Current Result Ref Range   WBC Count 5.3 (3/17/2025) 4.0 - 11.0 10e3/uL     _  Result Component Current Result Ref Range   Hemoglobin 13.9 (3/17/2025) 13.3 - 17.7 g/dL     _  Result Component Current Result Ref Range   Platelet Count 173 (3/17/2025) 150 - 450 10e3/uL     No results found for ANC within last 30 days.     _  Result Component Current Result Ref Range   Absolute Neutrophils 3.5 (3/17/2025) 1.6 - 8.3 10e3/uL        Assessment & Plan:  No concerning abnormalities.  Everything WNL.  Continue olaparib as planned.    Questions answered to patient's satisfaction.    Follow-Up:  Appt 3/24    Thank you,  Ashvin Eubanks, PharmD  Oral Chemotherapy Monitoring Program - Skin/Sarcoma/  [phone]

## 2025-03-20 LAB — TESTOST SERPL-MCNC: 207 NG/DL (ref 240–950)

## 2025-03-22 ENCOUNTER — HEALTH MAINTENANCE LETTER (OUTPATIENT)
Age: 60
End: 2025-03-22

## 2025-03-24 DIAGNOSIS — C61 PROSTATE CANCER (H): Primary | ICD-10-CM

## 2025-03-24 NOTE — PROGRESS NOTES
Virtual Visit Details    Type of service:  Video Visit   Originating Location (pt. Location):  Home    Distant Location (provider location):  [facility]  Platform used for Video Visit:  Kerwin    -------------------------------------------------------------------------------------------------------------------------------------------------     FOLLOW UP VISIT  -  TELEMEDICINE       Reason for Visit:  Biochemically-recurrent prostate cancer with PSMA-positive peritoneal deposits, on olaparib (since 2/4/25).     History of Present Illness:  Mr. Gao is a 59-year old man who was diagnosed with prostate cancer in 10/2010, at age 45.  His PSA level at that time was 3.7 ng/mL.  He underwent a radical prostatectomy on 12/6/2010, which revealed prostatic acinar adenocarcinoma, [place] 4+4=8, with organ-confined disease, no extraprostatic extension or seminal vesicle invasion, 0/16 positive lymph nodes, but he did have a positive apical surgical margin.  His final pathological stage was pT2b N0 R1.  [facility] NGS analysis revealed a TMPRSS2-ETV5 fusion, a hemizygous BRCA2 deletion, and a homozygous PTEN deletion, with TMB 3 muts/Mb, and gLOH 21% (high).     Following surgery, he developed a biochemical recurrence with a PSA level reaching 0.34 ng/mL by 8/2013.  He was then treated with salvage external-beam radiotherapy using 7020 cGy over 39 fractions, ending in 12/2013.  He also received concurrent androgen deprivation therapy for 12 months, ending in 9/2014.  His PSA level then remained undetectable for approximately 6 years.  Unfortunately, in 3/2020 he developed a further biochemical recurrence.  His PSA on 3/3/2020 was 0.2, the PSA on 11/20/2020 was 0.3, the PSA on 4/23/2021 was 0.6, and the PSA on 2/17/2022 was 1.59 ng/mL.  The patient then underwent a PSMA-PET scan on 2/17/2022.  This showed a PSMA-avid right iliac lymph node metastasis, without other evidence of local recurrence or  S:   Follow-up from ER visit for another ruptured ovarian cyst.  Seen in [place].  US showed rupture Right Ovarian Cyst with fluid in cul de sac.    O:   US reviewed and CL on Right with free fluid.    A:   Rupture Right Ovarian Cyst        Recurring rupture of ovarian cyst with        Poor ovulation supression.    P:   Loestrin 1.5/30         Nexplanon out with Zac 1 week (original placed by her)   distant spread.  On 4/29/2022, he underwent surgical metastasectomy with Dr Antoine without any additional ADT at that time.  As a result, his PSA level has dropped down to 0.32 ng/mL (9/9/2022).  The PSA on 11/10/22 was 0.48 ng/mL, and the PSA on 1/10/23 was 0.70 ng/mL.       By 5/1/2023, his PSA level had again risen and reached 1.21 ng/mL.  He underwent a PSMA-PET (5/30/2023) which showed 6 small tracer-avid peritoneal metastases.  He began relugolix on 6/14/23, and received this agent for about 5 months.  His PSA dropped down to 0.05 ng/mL in 9/2023.  He went off ADT for a time, and his PSA level has increased back up to 0.95 ng/mL (1/4/24).  He then went back on relugolix, and his PSA level dropped down to a martin of 0.05 ng/mL.  He stopped the relugolix in 1/2025.  By 2/17/25, his PSA level was 0.28 ng/mL.  On 2/4/2025, he began olaparib 300 mg BID.  His recent PSA level (3/17/25) is 0.72 ng/mL.  He returns today for a follow-up visit.  We conducted the encounter by video visit today, to discuss side effect management and future treatment plans.     Review of Systems:  The patient reports feeling generally well.  He has started a GLP-1 agonist agent for hyperglycemia and weight gain.  He also began olaparib on 2/4/25.  This has caused a decrease in his appetite, some nausea, and erratic bowels.  He continues to report tinnitus, and this did not improve after stopping bupropion.  He does have erectile dysfunction, which has been a problem for him since his radical prostatectomy.  He has started to experience hot flashes since beginning relugolix.  He does not report any additional new signs or symptoms at this time.  He has not had any changes in his weight.  He does not have any cancer-related pain.  A comprehensive 14-system review of symptoms is otherwise generally within normal limits.  His ECOG score is 0.  His pain score is 0/10.     Medications:  Olaparib 300 mg BID (since 2/4/25)  Rosuvastatin 10  mg  Semaglutide 0.25 mg SQ q7d  Ondansetron 8 mg PRN  Bupropion 300 mg - stopped  Fluticasone nasal spray  Ibuprofen PRN     Physical Examination:  Mr. Gao is a 59-year-old man who appears comfortable at rest.  His vital signs are unremarkable.  His HEENT examination is within normal limits.  There is no palpable lymphadenopathy.  The cardiac, pulmonary, and gastrointestinal examinations are within normal limits.  The neuromuscular examination is intact.  The extremities do not demonstrate pitting edema.  The skin evaluation is unremarkable.     Surgical Pathology (4/29/2022):  He underwent surgical metastasectomy of a right retroperitoneal lymph node on 4/29/22.  This showed metastatic prostatic adenocarcinoma (2.5 cm in greatest diameter) with extranodal extension.  [facility] NGS analysis showed a TMPRSS2-ETV5 fusion, a hemizygous BRCA2 deletion, a homozygous PTEN deletion, with TMB 3 muts/Mb, and gLOH 21% (high).      PSMA-PET scan (5/30/2023):  This showed six small radiotracer-avid enhancing peritoneal deposits consistent with metastases. There were no amy or osseous metastases visualized.     Laboratories (3/17/2025):  His PSA level is 0.72 ng/mL, which is rising.  His testosterone level is 207 ng/mL.  His CBC shows a WBC count of 5.3, hemoglobin 13.9, hematocrit 41%, platelets 173K.  His comprehensive metabolic panel is entirely within normal limits, including a glucose level of 93 mg/dL.       ASSESSMENT AND PLAN:  Mr. Gao is a 59-year-old man with a history of high-risk localized prostate cancer (pT2b N0 R1, [place] 4+4=8, PSA 3.7 ng/mL) who underwent radical prostatectomy followed by salvage radiotherapy.  He subsequently developed a biochemical recurrence with a PSA level of 1.59 ng/mL and a PSMA-PET scan showing a solitary right iliac amy metastasis which was surgically resected on 4/29/2022.  The PSA maddy back up to 1.21 ng/mL, and he started relugolix on 6/14/2023, with a favorable treatment  response after 5 months.  By 1/4/2024 the PSA level was 0.95 ng/mL, and he took relugolix again from then until 1/2025.  On 2/4/25, he began olaparib 300 mg BID.  His current PSA level is 0.72 ng/mL.  His ECOG score is 0.  His pain score is 0/10.     The patient previously developed a single metastatic recurrence involving a right iliac lymph node, without evidence of additional local or distant metastatic deposits.  On 4/29/2022, he underwent successful surgical metastasectomy which confirmed metastatic prostatic adenocarcinoma.  Following resection, his PSA level dropped from 1.59 ng/mL down to 0.32 ng/mL.  However, the current PSA is back up to 1.21 ng/mL.  Since his PSA level has risen above 1.0 ng/mL, we decided to obtain another PSMA-PET scan on 5/30/23.  Unfortunately, his the PSMA scan suggested peritoneal implants which are not common in prostate cancer, but these could certainly explain his rising PSA level.  I had presented him with several options at that time. At the end of our prior conversations, we had decided to begin relugolix.  He began this treatment on 6/14/2023.  I had planned to treat him with 6-12 months of relugolix therapy, followed by a potential cessation of therapy if he achieves a complete PSA response.    However after stopping ADT late last year, his PSA level maddy back up to 0.95 ng/mL by 1/4/2024.  At that time, I had discussed several options either involving ADT alone, or the combination of ADT plus enzalutamide consistent with the recent published results of the EMBARK study.  In addition, he could consider enzalutamide monotherapy, as per the third arm of the EMBARK study.  The patient did have an interesting genomic profile with a high-genome wide PARISH score (gLOH 21%), and thus he was interested in treatment using a PARP inhibitory such as olaparib.  However, the use of olaparib was declined by his insurance at that time.  Thus, he elected to go back on relugolix in 1/2024,  which resulted in another PSA decline (0.95 --> 0.05 ng/mL).  We had planned to treat him with relugolix for a duration of one year (until 1/2025).      In 1/2025 he completed a one-year course of relugolix and stopped this medication.  On 2/4/25, he began olaparib.  However his PSA level on 3/17/25 was 0.72 ng/mL.  We have discussed several options today.  These include: continuing olaparib for one or two more months, going back on relugolix or another form of ADT (although this might be permanent moving forward), or repeating a PSMA-PET scan at this time.  My advice was to remain on olaparib for 1-2 more months, and to repeat a PSMA-PET scan only if the PSA level goes up one more time.  After hearing all of these management options, the patient has elected to proceed with a PET scan and he will also obtain another PSA test in mid- April 2025.  I will see him virtually soon after that.     A total of 40 minutes were spent on this patient on the date of the encounter conducting chart review, review of test results, interpretation of tests, patient visit, documentation and discussion with other provider(s).  The patient was given the opportunity to ask multiple questions today, all of which were answered to his satisfaction.    The longitudinal plan of care for the diagnosis(es)/condition(s) as documented were addressed during this visit. Due to the added complexity in care, I will continue to support Mr. Gao in the subsequent management and with ongoing continuity of care.     Al Morocho M.D.

## 2025-03-26 ENCOUNTER — VIRTUAL VISIT (OUTPATIENT)
Dept: ONCOLOGY | Facility: CLINIC | Age: 60
End: 2025-03-26
Attending: INTERNAL MEDICINE
Payer: COMMERCIAL

## 2025-03-26 VITALS — BODY MASS INDEX: 31.38 KG/M2 | HEIGHT: 71 IN

## 2025-03-26 DIAGNOSIS — C61 MALIGNANT NEOPLASM OF PROSTATE (H): Primary | ICD-10-CM

## 2025-03-26 ASSESSMENT — PAIN SCALES - GENERAL: PAINLEVEL_OUTOF10: NO PAIN (0)

## 2025-03-26 NOTE — LETTER
3/26/2025      Duglas Gao  [address] [facility] [address]      Dear Colleague,    Thank you for referring your patient, Duglas Gao, to the [facility]. Please see a copy of my visit note below.      Virtual Visit Details    Type of service:  Video Visit   Originating Location (pt. 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     History of Present Illness:  Mr. Gao is a 59-year old man who was diagnosed with prostate cancer in 10/2010, at age 45.  His PSA level at that time was 3.7 ng/mL.  He underwent a radical prostatectomy on 12/6/2010, which revealed prostatic acinar adenocarcinoma, [place] 4+4=8, with organ-confined disease, no extraprostatic extension or seminal vesicle invasion, 0/16 positive lymph nodes, but he did have a positive apical surgical margin.  His final pathological stage was pT2b N0 R1.  [facility] NGS analysis revealed a TMPRSS2-ETV5 fusion, a hemizygous BRCA2 deletion, and a homozygous PTEN deletion, with TMB 3 muts/Mb, and gLOH 21% (high).     Following surgery, he developed a biochemical recurrence with a PSA level reaching 0.34 ng/mL by 8/2013.  He was then treated with salvage external-beam radiotherapy using 7020 cGy over 39 fractions, ending in 12/2013.  He also received concurrent androgen deprivation therapy for 12 months, ending in 9/2014.  His PSA level then remained undetectable for approximately 6 years.  Unfortunately, in 3/2020 he developed a further biochemical recurrence.  His PSA on 3/3/2020 was 0.2, the PSA on 11/20/2020  was 0.3, the PSA on 4/23/2021 was 0.6, and the PSA on 2/17/2022 was 1.59 ng/mL.  The patient then underwent a PSMA-PET scan on 2/17/2022.  This showed a PSMA-avid right iliac lymph node metastasis, without other evidence of local recurrence or distant spread.  On 4/29/2022, he underwent surgical metastasectomy with Dr Antoine without any additional ADT at that time.  As a result, his PSA level has dropped down to 0.32 ng/mL (9/9/2022).  The PSA on 11/10/22 was 0.48 ng/mL, and the PSA on 1/10/23 was 0.70 ng/mL.       By 5/1/2023, his PSA level had again risen and reached 1.21 ng/mL.  He underwent a PSMA-PET (5/30/2023) which showed 6 small tracer-avid peritoneal metastases.  He began relugolix on 6/14/23, and received this agent for about 5 months.  His PSA dropped down to 0.05 ng/mL in 9/2023.  He went off ADT for a time, and his PSA level has increased back up to 0.95 ng/mL (1/4/24).  He then went back on relugolix, and his PSA level dropped down to a martin of 0.05 ng/mL.  He stopped the relugolix in 1/2025.  By 2/17/25, his PSA level was 0.28 ng/mL.  On 2/4/2025, he began olaparib 300 mg BID.  His recent PSA level (3/17/25) is 0.72 ng/mL.  He returns today for a follow-up visit.  We conducted the encounter by video visit today, to discuss side effect management and future treatment plans.     Review of Systems:  The patient reports feeling generally well.  He has started a GLP-1 agonist agent for hyperglycemia and weight gain.  He also began olaparib on 2/4/25.  This has caused a decrease in his appetite, some nausea, and erratic bowels.  He continues to report tinnitus, and this did not improve after stopping bupropion.  He does have erectile dysfunction, which has been a problem for him since his radical prostatectomy.  He has started to experience hot flashes since beginning relugolix.  He does not report any additional new signs or symptoms at this time.  He has not had any changes in his weight.  He does not  have any cancer-related pain.  A comprehensive 14-system review of symptoms is otherwise generally within normal limits.  His ECOG score is 0.  His pain score is 0/10.     Medications:  Olaparib 300 mg BID (since 2/4/25)  Rosuvastatin 10 mg  Semaglutide 0.25 mg SQ q7d  Ondansetron 8 mg PRN  Bupropion 300 mg - stopped  Fluticasone nasal spray  Ibuprofen PRN     Physical Examination:  Mr. Gao is a 59-year-old man who appears comfortable at rest.  His vital signs are unremarkable.  His HEENT examination is within normal limits.  There is no palpable lymphadenopathy.  The cardiac, pulmonary, and gastrointestinal examinations are within normal limits.  The neuromuscular examination is intact.  The extremities do not demonstrate pitting edema.  The skin evaluation is unremarkable.     Surgical Pathology (4/29/2022):  He underwent surgical metastasectomy of a right retroperitoneal lymph node on 4/29/22.  This showed metastatic prostatic adenocarcinoma (2.5 cm in greatest diameter) with extranodal extension.  [facility] NGS analysis showed a TMPRSS2-ETV5 fusion, a hemizygous BRCA2 deletion, a homozygous PTEN deletion, with TMB 3 muts/Mb, and gLOH 21% (high).      PSMA-PET scan (5/30/2023):  This showed six small radiotracer-avid enhancing peritoneal deposits consistent with metastases. There were no amy or osseous metastases visualized.     Laboratories (3/17/2025):  His PSA level is 0.72 ng/mL, which is rising.  His testosterone level is 207 ng/mL.  His CBC shows a WBC count of 5.3, hemoglobin 13.9, hematocrit 41%, platelets 173K.  His comprehensive metabolic panel is entirely within normal limits, including a glucose level of 93 mg/dL.       ASSESSMENT AND PLAN:  Mr. Gao is a 59-year-old man with a history of high-risk localized prostate cancer (pT2b N0 R1, [place] 4+4=8, PSA 3.7 ng/mL) who underwent radical prostatectomy followed by salvage radiotherapy.  He subsequently developed a biochemical recurrence with a PSA  level of 1.59 ng/mL and a PSMA-PET scan showing a solitary right iliac amy metastasis which was surgically resected on 4/29/2022.  The PSA maddy back up to 1.21 ng/mL, and he started relugolix on 6/14/2023, with a favorable treatment response after 5 months.  By 1/4/2024 the PSA level was 0.95 ng/mL, and he took relugolix again from then until 1/2025.  On 2/4/25, he began olaparib 300 mg BID.  His current PSA level is 0.72 ng/mL.  His ECOG score is 0.  His pain score is 0/10.     The patient previously developed a single metastatic recurrence involving a right iliac lymph node, without evidence of additional local or distant metastatic deposits.  On 4/29/2022, he underwent successful surgical metastasectomy which confirmed metastatic prostatic adenocarcinoma.  Following resection, his PSA level dropped from 1.59 ng/mL down to 0.32 ng/mL.  However, the current PSA is back up to 1.21 ng/mL.  Since his PSA level has risen above 1.0 ng/mL, we decided to obtain another PSMA-PET scan on 5/30/23.  Unfortunately, his the PSMA scan suggested peritoneal implants which are not common in prostate cancer, but these could certainly explain his rising PSA level.  I had presented him with several options at that time. At the end of our prior conversations, we had decided to begin relugolix.  He began this treatment on 6/14/2023.  I had planned to treat him with 6-12 months of relugolix therapy, followed by a potential cessation of therapy if he achieves a complete PSA response.    However after stopping ADT late last year, his PSA level maddy back up to 0.95 ng/mL by 1/4/2024.  At that time, I had discussed several options either involving ADT alone, or the combination of ADT plus enzalutamide consistent with the recent published results of the EMBARK study.  In addition, he could consider enzalutamide monotherapy, as per the third arm of the EMBARK study.  The patient did have an interesting genomic profile with a high-genome wide  PARISH score (gLOH 21%), and thus he was interested in treatment using a PARP inhibitory such as olaparib.  However, the use of olaparib was declined by his insurance at that time.  Thus, he elected to go back on relugolix in 1/2024, which resulted in another PSA decline (0.95 --> 0.05 ng/mL).  We had planned to treat him with relugolix for a duration of one year (until 1/2025).      In 1/2025 he completed a one-year course of relugolix and stopped this medication.  On 2/4/25, he began olaparib.  However his PSA level on 3/17/25 was 0.72 ng/mL.  We have discussed several options today.  These include: continuing olaparib for one or two more months, going back on relugolix or another form of ADT (although this might be permanent moving forward), or repeating a PSMA-PET scan at this time.  My advice was to remain on olaparib for 1-2 more months, and to repeat a PSMA-PET scan only if the PSA level goes up one more time.  After hearing all of these management options, the patient has elected to proceed with a PET scan and he will also obtain another PSA test in mid- April 2025.  I will see him virtually soon after that.     A total of 40 minutes were spent on this patient on the date of the encounter conducting chart review, review of test results, interpretation of tests, patient visit, documentation and discussion with other provider(s).  The patient was given the opportunity to ask multiple questions today, all of which were answered to his satisfaction.    The longitudinal plan of care for the diagnosis(es)/condition(s) as documented were addressed during this visit. Due to the added complexity in care, I will continue to support Mr. Gao in the subsequent management and with ongoing continuity of care.     Al Morocho M.D.      Again, thank you for allowing me to participate in the care of your patient.        Sincerely,        Al Morocho MD    Electronically signed

## 2025-03-26 NOTE — NURSING NOTE
Current patient location: [address] [place], [place] [id number]    Is the patient currently in the state of [place]? YES    Visit mode: VIDEO    If the visit is dropped, the patient can be reconnected by:VIDEO VISIT: Text to cell phone:   Telephone Information:   Mobile [phone]       Will anyone else be joining the visit? NO  (If patient encounters technical issues they should call [phone]    Are changes needed to the allergy or medication list? No, Pt stated no changes to allergies, and Pt stated no med changes    Are refills needed on medications prescribed by this physician? NO    Rooming Documentation:  Questionnaire(s) completed    Reason for visit: RECHECK (Return [facility])    Aure NIEVES

## 2025-04-08 ENCOUNTER — HOSPITAL ENCOUNTER (OUTPATIENT)
Dept: PET IMAGING | Facility: CLINIC | Age: 60
Setting detail: NUCLEAR MEDICINE
Discharge: HOME | End: 2025-04-08
Attending: INTERNAL MEDICINE | Admitting: INTERNAL MEDICINE
Payer: COMMERCIAL

## 2025-04-08 DIAGNOSIS — C61 MALIGNANT NEOPLASM OF PROSTATE (H): ICD-10-CM

## 2025-04-08 PROCEDURE — 78815 PET IMAGE W/CT SKULL-THIGH: CPT | Mod: PS

## 2025-04-08 PROCEDURE — A9596 HC RX 343 MED OP 636: HCPCS | Performed by: INTERNAL MEDICINE

## 2025-04-08 PROCEDURE — 250N000011 HC RX IP 250 OP 636: Performed by: INTERNAL MEDICINE

## 2025-04-08 PROCEDURE — 343N000001 HC RX 343 MED OP 636: Performed by: INTERNAL MEDICINE

## 2025-04-08 PROCEDURE — 74177 CT ABD & PELVIS W/CONTRAST: CPT

## 2025-04-08 PROCEDURE — 71260 CT THORAX DX C+: CPT

## 2025-04-08 RX ORDER — IOPAMIDOL 755 MG/ML
50-135 INJECTION, SOLUTION INTRAVASCULAR ONCE
Status: COMPLETED | OUTPATIENT
Start: 2025-04-08 | End: 2025-04-08

## 2025-04-08 RX ADMIN — KIT FOR THE PREPARATION OF GALLIUM GA 68 GOZETOTIDE INJECTION 4.72 MILLICURIE: KIT INTRAVENOUS at 10:39

## 2025-04-08 RX ADMIN — IOPAMIDOL 135 ML: 755 INJECTION, SOLUTION INTRAVENOUS at 11:33

## 2025-04-15 ENCOUNTER — TELEPHONE (OUTPATIENT)
Dept: ONCOLOGY | Facility: CLINIC | Age: 60
End: 2025-04-15

## 2025-04-15 ENCOUNTER — LAB (OUTPATIENT)
Dept: LAB | Facility: CLINIC | Age: 60
End: 2025-04-15
Payer: COMMERCIAL

## 2025-04-15 DIAGNOSIS — C61 PROSTATE CANCER (H): ICD-10-CM

## 2025-04-15 DIAGNOSIS — C61 MALIGNANT NEOPLASM OF PROSTATE (H): ICD-10-CM

## 2025-04-15 LAB
ALBUMIN SERPL BCG-MCNC: 4.6 G/DL (ref 3.5–5.2)
ALP SERPL-CCNC: 82 U/L (ref 40–150)
ALT SERPL W P-5'-P-CCNC: 52 U/L (ref 0–70)
ANION GAP SERPL CALCULATED.3IONS-SCNC: 9 MMOL/L (ref 7–15)
AST SERPL W P-5'-P-CCNC: 26 U/L (ref 0–45)
BASOPHILS # BLD AUTO: 0 10E3/UL (ref 0–0.2)
BASOPHILS NFR BLD AUTO: 1 %
BILIRUB SERPL-MCNC: 0.5 MG/DL
BUN SERPL-MCNC: 8.9 MG/DL (ref 8–23)
CALCIUM SERPL-MCNC: 10.2 MG/DL (ref 8.8–10.4)
CHLORIDE SERPL-SCNC: 104 MMOL/L (ref 98–107)
CREAT SERPL-MCNC: 1.17 MG/DL (ref 0.67–1.17)
EGFRCR SERPLBLD CKD-EPI 2021: 71 ML/MIN/1.73M2
EOSINOPHIL # BLD AUTO: 0.1 10E3/UL (ref 0–0.7)
EOSINOPHIL NFR BLD AUTO: 3 %
ERYTHROCYTE [DISTWIDTH] IN BLOOD BY AUTOMATED COUNT: 16.2 % (ref 10–15)
GLUCOSE SERPL-MCNC: 93 MG/DL (ref 70–99)
HCO3 SERPL-SCNC: 26 MMOL/L (ref 22–29)
HCT VFR BLD AUTO: 40.7 % (ref 40–53)
HGB BLD-MCNC: 14 G/DL (ref 13.3–17.7)
IMM GRANULOCYTES # BLD: 0 10E3/UL
IMM GRANULOCYTES NFR BLD: 1 %
LYMPHOCYTES # BLD AUTO: 1.2 10E3/UL (ref 0.8–5.3)
LYMPHOCYTES NFR BLD AUTO: 27 %
MCH RBC QN AUTO: 32.3 PG (ref 26.5–33)
MCHC RBC AUTO-ENTMCNC: 34.4 G/DL (ref 31.5–36.5)
MCV RBC AUTO: 94 FL (ref 78–100)
MONOCYTES # BLD AUTO: 0.4 10E3/UL (ref 0–1.3)
MONOCYTES NFR BLD AUTO: 8 %
NEUTROPHILS # BLD AUTO: 2.7 10E3/UL (ref 1.6–8.3)
NEUTROPHILS NFR BLD AUTO: 61 %
NRBC # BLD AUTO: 0 10E3/UL
NRBC BLD AUTO-RTO: 0 /100
PLATELET # BLD AUTO: 177 10E3/UL (ref 150–450)
POTASSIUM SERPL-SCNC: 4.2 MMOL/L (ref 3.4–5.3)
PROT SERPL-MCNC: 7.3 G/DL (ref 6.4–8.3)
PSA SERPL DL<=0.01 NG/ML-MCNC: 1.07 NG/ML (ref 0–4.5)
RBC # BLD AUTO: 4.34 10E6/UL (ref 4.4–5.9)
SODIUM SERPL-SCNC: 139 MMOL/L (ref 135–145)
WBC # BLD AUTO: 4.5 10E3/UL (ref 4–11)

## 2025-04-15 PROCEDURE — 84403 ASSAY OF TOTAL TESTOSTERONE: CPT | Performed by: INTERNAL MEDICINE

## 2025-04-15 PROCEDURE — 80053 COMPREHEN METABOLIC PANEL: CPT | Performed by: PATHOLOGY

## 2025-04-15 PROCEDURE — 84153 ASSAY OF PSA TOTAL: CPT | Performed by: PATHOLOGY

## 2025-04-15 PROCEDURE — 99000 SPECIMEN HANDLING OFFICE-LAB: CPT | Performed by: PATHOLOGY

## 2025-04-15 PROCEDURE — 36415 COLL VENOUS BLD VENIPUNCTURE: CPT | Performed by: PATHOLOGY

## 2025-04-15 PROCEDURE — 85025 COMPLETE CBC W/AUTO DIFF WBC: CPT | Performed by: PATHOLOGY

## 2025-04-15 NOTE — ORAL ONC MGMT
Oral Chemotherapy Monitoring Program    Subjective/Objective:  Duglas Gao is a 59 year old male contacted by phone for a follow-up visit for oral chemotherapy.  Carlos reports feeling very poorly - endorses persistent grade 1 nausea even with use of ondansetron, grade 2 constipation and grade 2 fatigue. Has bowel movement every 1-2 days with use of milk of magnesia or similar laxative, has no bowel movements without laxative use. Fatigue is persistent and not relieved with rest. Carlos very concerned about efficacy of Lynparza - notes PSA is not decreasing.        2/6/2025    10:00 AM 2/10/2025     8:00 AM 2/17/2025     3:00 PM 2/27/2025    12:00 PM 3/18/2025    11:00 AM 3/28/2025    12:00 PM 4/15/2025     2:00 PM   ORAL CHEMOTHERAPY   Assessment Type Left Voicemail Initial Follow up Lab Monitoring Refill Lab Monitoring;Monthly Follow up Refill Lab Monitoring;Monthly Follow up   Diagnosis Code Prostate Cancer Prostate Cancer Prostate Cancer Prostate Cancer Prostate Cancer Prostate Cancer Prostate Cancer   Providers Bailee Morocho   [facility]   Is this patient followed by the [facility] OC team? No No No No No No No   Drug Name Lynparza (olaparib) Lynparza (olaparib) Lynparza (olaparib) Lynparza (olaparib) Lynparza (olaparib) Lynparza (olaparib) Lynparza (olaparib)   Dose 300 mg 300 mg 300 mg 300 mg 300 mg 300 mg 300 mg   Current Schedule BID BID BID BID BID BID BID   Cycle Details Continuous Continuous Continuous Continuous Continuous Continuous Continuous   Start Date of Last Cycle  2/4/2025        Doses missed in last 2 weeks  0   0  0   Adherence Assessment  Adherent   Adherent  Adherent   Adverse Effects  Nausea;Diarrhea;Decreased Appetite/Weight Loss   Nausea;Diarrhea;Fatigue;Decreased Appetite/Weight Loss  Constipation   Nausea  Grade 1   Grade 1  Grade 1   Pharmacist  "Intervention(nausea)  No   No  No   Constipation       Grade 2   Pharmacist Intervention(constipation)       No   Diarrhea  Grade 1   Grade 1     Pharmacist Intervention(diarrhea)  No   Yes     Intervention(s)     Patient education     Decrease Appetite/Weight Loss  Grade 1   Grade 1     Pharmacist Intervention(decreased appetite/weight loss)  No   Yes     Intervention(s)     Patient education     Fatigue     Grade 1  Grade 2   Pharmacist Intervention(fatigue)     Yes  Yes   Intervention(s)     Patient education  Patient education   Any new drug interactions?  No        Is the dose as ordered appropriate for the patient?  Yes        Has the patient missed any days of school, work, or other routine activity?  No        Since the last time we talked, have you been hospitalized or used the emergency room?  No        Was a medication prescribed as part of a CPA?  No            Last PHQ-2 Score on record:       6/8/2023     1:12 PM 4/19/2022     7:39 AM   PHQ-2 ( 1999 [employer])   Q1: Little interest or pleasure in doing things 1 0   Q2: Feeling down, depressed or hopeless 1 0   PHQ-2 Score 2 0       Vitals:  BP:   BP Readings from Last 1 Encounters:   01/22/25 136/82     Wt Readings from Last 1 Encounters:   01/22/25 102.1 kg (225 lb)     Estimated body surface area is 2.26 meters squared as calculated from the following:    Height as of 3/26/25: 1.803 m (5' 11\").    Weight as of 1/22/25: 102.1 kg (225 lb).    Labs:  _  Result Component Current Result Ref Range   Sodium 139 (4/15/2025) 135 - 145 mmol/L     _  Result Component Current Result Ref Range   Potassium 4.2 (4/15/2025) 3.4 - 5.3 mmol/L     _  Result Component Current Result Ref Range   Calcium 10.2 (4/15/2025) 8.8 - 10.4 mg/dL     No results found for Mag within last 30 days.     No results found for Phos within last 30 days.     _  Result Component Current Result Ref Range   Albumin 4.6 (4/15/2025) 3.5 - 5.2 g/dL     _  Result Component Current Result Ref Range "   Urea Nitrogen 8.9 (4/15/2025) 8.0 - 23.0 mg/dL     _  Result Component Current Result Ref Range   Creatinine 1.17 (4/15/2025) 0.67 - 1.17 mg/dL     _  Result Component Current Result Ref Range   AST 26 (4/15/2025) 0 - 45 U/L     _  Result Component Current Result Ref Range   ALT 52 (4/15/2025) 0 - 70 U/L     _  Result Component Current Result Ref Range   Bilirubin Total 0.5 (4/15/2025) <=1.2 mg/dL     _  Result Component Current Result Ref Range   WBC Count 4.5 (4/15/2025) 4.0 - 11.0 10e3/uL     _  Result Component Current Result Ref Range   Hemoglobin 14.0 (4/15/2025) 13.3 - 17.7 g/dL     _  Result Component Current Result Ref Range   Platelet Count 177 (4/15/2025) 150 - 450 10e3/uL     No results found for ANC within last 30 days.     _  Result Component Current Result Ref Range   Absolute Neutrophils 2.7 (4/15/2025) 1.6 - 8.3 10e3/uL          Assessment/Plan:  Patient has significant side effect burden, has appointment with Dr. Morocho on 4/21 to discuss risk/benefit of remaining on Lynparza. Will continue Lynparza until that appointment.    Follow-Up:  4/21 appt    Refill Due:  5/5    Thank you,  Ashvin Eubanks, PharmD  Oral Chemotherapy Monitoring Program - Skin/Sarcoma/  [phone]

## 2025-04-15 NOTE — ORAL ONC MGMT
Oral Chemotherapy Monitoring Program  Lab Follow Up    Reviewed lab results from 4/15.        2/6/2025    10:00 AM 2/10/2025     8:00 AM 2/17/2025     3:00 PM 2/27/2025    12:00 PM 3/18/2025    11:00 AM 3/28/2025    12:00 PM 4/15/2025     2:00 PM   ORAL CHEMOTHERAPY   Assessment Type Left Voicemail Initial Follow up Lab Monitoring Refill Lab Monitoring;Monthly Follow up Refill Lab Monitoring;Monthly Follow up   Diagnosis Code Prostate Cancer Prostate Cancer Prostate Cancer Prostate Cancer Prostate Cancer Prostate Cancer Prostate Cancer   Providers Bailee Morocho   [facility]   Is this patient followed by the [facility] OC team? No No No No No No No   Drug Name Lynparza (olaparib) Lynparza (olaparib) Lynparza (olaparib) Lynparza (olaparib) Lynparza (olaparib) Lynparza (olaparib) Lynparza (olaparib)   Dose 300 mg 300 mg 300 mg 300 mg 300 mg 300 mg 300 mg   Current Schedule BID BID BID BID BID BID BID   Cycle Details Continuous Continuous Continuous Continuous Continuous Continuous Continuous   Start Date of Last Cycle  2/4/2025        Doses missed in last 2 weeks  0   0  0   Adherence Assessment  Adherent   Adherent  Adherent   Adverse Effects  Nausea;Diarrhea;Decreased Appetite/Weight Loss   Nausea;Diarrhea;Fatigue;Decreased Appetite/Weight Loss  Constipation   Nausea  Grade 1   Grade 1  Grade 1   Pharmacist Intervention(nausea)  No   No  No   Constipation       Grade 2   Pharmacist Intervention(constipation)       No   Diarrhea  Grade 1   Grade 1     Pharmacist Intervention(diarrhea)  No   Yes     Intervention(s)     Patient education     Decrease Appetite/Weight Loss  Grade 1   Grade 1     Pharmacist Intervention(decreased appetite/weight loss)  No   Yes     Intervention(s)     Patient education     Fatigue     Grade 1  Grade 2   Pharmacist Intervention(fatigue)     Yes  Yes    Intervention(s)     Patient education  Patient education   Any new drug interactions?  No        Is the dose as ordered appropriate for the patient?  Yes        Has the patient missed any days of school, work, or other routine activity?  No        Since the last time we talked, have you been hospitalized or used the emergency room?  No        Was a medication prescribed as part of a CPA?  No            Labs:  _  Result Component Current Result Ref Range   Sodium 139 (4/15/2025) 135 - 145 mmol/L     _  Result Component Current Result Ref Range   Potassium 4.2 (4/15/2025) 3.4 - 5.3 mmol/L     _  Result Component Current Result Ref Range   Calcium 10.2 (4/15/2025) 8.8 - 10.4 mg/dL     No results found for Mag within last 30 days.     No results found for Phos within last 30 days.     _  Result Component Current Result Ref Range   Albumin 4.6 (4/15/2025) 3.5 - 5.2 g/dL     _  Result Component Current Result Ref Range   Urea Nitrogen 8.9 (4/15/2025) 8.0 - 23.0 mg/dL     _  Result Component Current Result Ref Range   Creatinine 1.17 (4/15/2025) 0.67 - 1.17 mg/dL     _  Result Component Current Result Ref Range   AST 26 (4/15/2025) 0 - 45 U/L     _  Result Component Current Result Ref Range   ALT 52 (4/15/2025) 0 - 70 U/L     _  Result Component Current Result Ref Range   Bilirubin Total 0.5 (4/15/2025) <=1.2 mg/dL     _  Result Component Current Result Ref Range   WBC Count 4.5 (4/15/2025) 4.0 - 11.0 10e3/uL     _  Result Component Current Result Ref Range   Hemoglobin 14.0 (4/15/2025) 13.3 - 17.7 g/dL     _  Result Component Current Result Ref Range   Platelet Count 177 (4/15/2025) 150 - 450 10e3/uL     No results found for ANC within last 30 days.     _  Result Component Current Result Ref Range   Absolute Neutrophils 2.7 (4/15/2025) 1.6 - 8.3 10e3/uL        Assessment & Plan:  No concerning abnormalities.  Continue olaparib as planned.    Questions answered to patient's satisfaction.    Follow-Up:  Appt 4/21    Thank  you,  Ashvin Eubanks, PharmD  Oral Chemotherapy Monitoring Program - Skin/Sarcoma/  [phone]

## 2025-04-19 NOTE — PROGRESS NOTES
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     History of Present Illness:  Mr. Gao is a 59-year old man who was diagnosed with prostate cancer in 10/2010, at age 45.  His PSA level at that time was 3.7 ng/mL.  He underwent a radical prostatectomy on 12/6/2010, which revealed prostatic acinar adenocarcinoma, [place] 4+4=8, with organ-confined disease, no extraprostatic extension or seminal vesicle invasion, 0/16 positive lymph nodes, but he did have a positive apical surgical margin.  His final pathological stage was pT2b N0 R1.  [facility] NGS analysis revealed a TMPRSS2-ETV5 fusion, a hemizygous BRCA2 deletion, and a homozygous PTEN deletion, with TMB 3 muts/Mb, and gLOH 21% (high).     Following surgery, he developed a biochemical recurrence with a PSA level reaching 0.34 ng/mL by 8/2013.  He was then treated with salvage external-beam radiotherapy using 7020 cGy over 39 fractions, ending in 12/2013.  He also received concurrent androgen deprivation therapy for 12 months, ending in 9/2014.  His PSA level then remained undetectable for approximately 6 years.  Unfortunately, in 3/2020 he developed a further biochemical recurrence.  His PSA on 3/3/2020 was 0.2, the PSA on 11/20/2020 was 0.3, the PSA on 4/23/2021 was 0.6, and the PSA on 2/17/2022 was 1.59 ng/mL.  The patient then underwent a PSMA-PET scan on 2/17/2022.  This showed a PSMA-avid right iliac lymph node metastasis, without other evidence of local recurrence or distant spread.  On 4/29/2022, he underwent surgical metastasectomy with Dr Antoine without any additional ADT at that time.  As a result, his PSA level has dropped down to 0.32 ng/mL (9/9/2022).  The PSA on 11/10/22 was 0.48  ng/mL, and the PSA on 1/10/23 was 0.70 ng/mL.       By 5/1/2023, his PSA level had again risen and reached 1.21 ng/mL.  He underwent a PSMA-PET (5/30/2023) which showed 6 small tracer-avid peritoneal metastases.  He began relugolix on 6/14/23, and received this agent for about 5 months.  His PSA dropped down to 0.05 ng/mL in 9/2023.  He went off ADT for a time, and his PSA level has increased back up to 0.95 ng/mL (1/4/24).  He then went back on relugolix, and his PSA level dropped down to a martin of 0.05 ng/mL.  He stopped the relugolix in 1/2025.  By 2/17/25, his PSA level was 0.28 ng/mL.  On 2/4/2025, he began olaparib 300 mg BID.  His recent PSA level (4/15/25) is 1.07 ng/mL, which continues to rise.  He returns today for a follow-up visit.  We conducted the encounter by video visit today.     Review of Systems:  The patient reports feeling generally well.  He has started a GLP-1 agonist agent for hyperglycemia and weight gain.  He also began olaparib on 2/4/25.  This has caused a decrease in his appetite, some nausea, and erratic bowels.  He continues to report tinnitus, and this did not improve after stopping bupropion.  He does have erectile dysfunction, which has been a problem for him since his radical prostatectomy.  He has started to experience hot flashes since beginning relugolix.  He does not report any additional new signs or symptoms at this time.  He has not had any changes in his weight.  He does not have any cancer-related pain.  A comprehensive 14-system review of symptoms is otherwise generally within normal limits.  His ECOG score is 0.  His pain score is 0/10.     Medications:  Olaparib 300 mg BID (since 2/4/25)  Rosuvastatin 10 mg  Semaglutide 0.25 mg SQ q7d  Ondansetron 8 mg PRN  Fluticasone nasal spray  Ibuprofen PRN     Physical Examination:  Mr. Gao is a 59-year-old man who appears comfortable at rest.  His vital signs are unremarkable.  His HEENT examination is within normal limits.   There is no palpable lymphadenopathy.  The cardiac, pulmonary, and gastrointestinal examinations are within normal limits.  The neuromuscular examination is intact.  The extremities do not demonstrate pitting edema.  The skin evaluation is unremarkable.     Surgical Pathology (4/29/2022):  He underwent surgical metastasectomy of a right retroperitoneal lymph node on 4/29/22.  This showed metastatic prostatic adenocarcinoma (2.5 cm in greatest diameter) with extranodal extension.  [facility] NGS analysis showed a TMPRSS2-ETV5 fusion, a hemizygous BRCA2 deletion, a homozygous PTEN deletion, with TMB 3 muts/Mb, and gLOH 21% (high).      PSMA-PET scan (5/30/2023):  This showed six small radiotracer-avid enhancing peritoneal deposits consistent with metastases. There were no nani or osseous metastases visualized.    PSMA-PET scan (4/8/2025):  Prostatectomy bed: No abnormal uptake to suggest local recurrence.  Bones: No definite osseous uptake to suggest osseous metastatic disease.  Nani or other disease: Scattered enhancing peritoneal foci are again noted, with varying degrees of uptake, most of which have decreased compared to prior (SUV 4.6 --> 1.5), with some stable, and one stable/minimally increased (SUV 3.0 --> 3.2).  Increasing uptake corresponding to hypodense thyroid nodule within the right lobe, is unchanged in CT appearance. Uptake is nonspecific and could be inflammatory. Recommend correlation thyroid ultrasound with consideration for biopsy pending those results.     Laboratories (4/15/2025):  His PSA level is 1.07 ng/mL, which is rising.  His testosterone level is 275 ng/mL.  His CBC shows a WBC count of 4.5, hemoglobin 14.0, hematocrit 40.7%, platelets 177K.  His comprehensive metabolic panel is entirely within normal limits, including a glucose level of 93 mg/dL.       ASSESSMENT AND PLAN:  Mr. Gao is a 59-year-old man with a history of high-risk localized prostate cancer (pT2b N0 R1, [place] 4+4=8, PSA  3.7 ng/mL) who underwent radical prostatectomy followed by salvage radiotherapy.  He subsequently developed a biochemical recurrence with a PSA level of 1.59 ng/mL and a PSMA-PET scan showing a solitary right iliac amy metastasis which was surgically resected on 4/29/2022.  The PSA maddy back up to 1.21 ng/mL, and he started relugolix on 6/14/2023, with a favorable treatment response after 5 months.  By 1/4/2024 the PSA level was 0.95 ng/mL, and he took relugolix again from then until 1/2025.  On 2/4/25, he began olaparib 300 mg BID.  His current PSA level is 1.07 ng/mL, which is rising despite olaparib.  His ECOG score is 0.  His pain score is 0/10.     The patient previously developed a single metastatic recurrence involving a right iliac lymph node, without evidence of additional local or distant metastatic deposits.  On 4/29/2022, he underwent successful surgical metastasectomy which confirmed metastatic prostatic adenocarcinoma.  Following resection, his PSA level dropped from 1.59 ng/mL down to 0.32 ng/mL.  However, the current PSA is back up to 1.21 ng/mL.  Since his PSA level has risen above 1.0 ng/mL, we decided to obtain another PSMA-PET scan on 5/30/23.  Unfortunately, his the PSMA scan suggested peritoneal implants which are not common in prostate cancer, but these could certainly explain his rising PSA level.  I had presented him with several options at that time. At the end of our prior conversations, we had decided to begin relugolix.  He began this treatment on 6/14/2023.  I had planned to treat him with 6-12 months of relugolix therapy, followed by a potential cessation of therapy if he achieves a complete PSA response.    However after stopping ADT late last year, his PSA level maddy back up to 0.95 ng/mL by 1/4/2024.  At that time, I had discussed several options either involving ADT alone, or the combination of ADT plus enzalutamide consistent with the recent published results of the [facility]  study.  In addition, he could consider enzalutamide monotherapy, as per the third arm of the EMBARK study.  The patient did have an interesting genomic profile with a high-genome wide PARISH score (gLOH 21%), and thus he was interested in treatment using a PARP inhibitory such as olaparib.  However, the use of olaparib was declined by his insurance at that time.  Thus, he elected to go back on relugolix in 1/2024, which resulted in another PSA decline (0.95 --> 0.05 ng/mL).  We had planned to treat him with relugolix for a duration of one year (until 1/2025).      In 1/2025 he completed a one-year course of relugolix and stopped this medication.  On 2/4/25, he began olaparib.  However his PSA level on 3/17/25 was 0.72 ng/mL and the PSA on 4/15/25 was 1.07 ng/mL.  Fortunately, his PSMA-PET scan (4/8/25) looks relatively stable.  We have discussed several options today.  These include: continuing olaparib for one or two more months, or going back on relugolix or another form of ADT (although this might be permanent moving forward).  My advice was to stop olaparib and go back on hormonal therapy.  We will start with relugolix, and will consider adding enzalutamide or darolutamide if he has a suboptimal biochemical response.  He was in agreement with this plan.     A total of 40 minutes were spent on this patient on the date of the encounter conducting chart review, review of test results, interpretation of tests, patient visit, documentation and discussion with other provider(s).  The patient was given the opportunity to ask multiple questions today, all of which were answered to his satisfaction.    Al Morocho M.D.

## 2025-04-21 ENCOUNTER — VIRTUAL VISIT (OUTPATIENT)
Dept: ONCOLOGY | Facility: CLINIC | Age: 60
End: 2025-04-21
Attending: INTERNAL MEDICINE
Payer: COMMERCIAL

## 2025-04-21 VITALS
SYSTOLIC BLOOD PRESSURE: 133 MMHG | HEIGHT: 71 IN | BODY MASS INDEX: 30.8 KG/M2 | DIASTOLIC BLOOD PRESSURE: 78 MMHG | WEIGHT: 220 LBS

## 2025-04-21 DIAGNOSIS — C61 MALIGNANT NEOPLASM OF PROSTATE (H): Primary | ICD-10-CM

## 2025-04-21 ASSESSMENT — PAIN SCALES - GENERAL: PAINLEVEL_OUTOF10: NO PAIN (0)

## 2025-04-21 NOTE — NURSING NOTE
Current patient location: [address] [place], [place] [id number]    Is the patient currently in the state of [place]? YES    Visit mode: VIDEO    If the visit is dropped, the patient can be reconnected by:VIDEO VISIT: Text to cell phone:   Telephone Information:   Mobile [phone]       Will anyone else be joining the visit? NO  (If patient encounters technical issues they should call [phone]    Are changes needed to the allergy or medication list? No    Are refills needed on medications prescribed by this physician? NO    Rooming Documentation:  Questionnaire(s) completed    Reason for visit: RECHEFREN VELEZF

## 2025-04-21 NOTE — PROGRESS NOTES
Virtual Visit Details    Type of service:  Video Visit   Originating Location (pt. Location):  Home    Distant Location (provider location):  [facility]  Platform used for Video Visit:  Kerwin

## 2025-04-21 NOTE — LETTER
4/21/2025      Duglas Gao  [address] [facility] [address]      Dear Colleague,    Thank you for referring your patient, Duglas Gao, to the [facility]. Please see a copy of my visit note below.      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     History of Present Illness:  Mr. Gao is a 59-year old man who was diagnosed with prostate cancer in 10/2010, at age 45.  His PSA level at that time was 3.7 ng/mL.  He underwent a radical prostatectomy on 12/6/2010, which revealed prostatic acinar adenocarcinoma, Cachorro 4+4=8, with organ-confined disease, no extraprostatic extension or seminal vesicle invasion, 0/16 positive lymph nodes, but he did have a positive apical surgical margin.  His final pathological stage was pT2b N0 R1.  [facility] NGS analysis revealed a TMPRSS2-ETV5 fusion, a hemizygous BRCA2 deletion, and a homozygous PTEN deletion, with TMB 3 muts/Mb, and gLOH 21% (high).     Following surgery, he developed a biochemical recurrence with a PSA level reaching 0.34 ng/mL by 8/2013.  He was then treated with salvage external-beam radiotherapy using 7020 cGy over 39 fractions, ending in 12/2013.  He also received concurrent androgen deprivation therapy for 12 months, ending in 9/2014.  His PSA level then remained undetectable for approximately 6 years.  Unfortunately, in 3/2020 he developed a further biochemical recurrence.  His PSA on 3/3/2020 was 0.2, the PSA on 11/20/2020 was 0.3, the PSA on 4/23/2021 was 0.6, and the PSA on 2/17/2022 was 1.59 ng/mL.  The patient then underwent a PSMA-PET scan on 2/17/2022.  This showed a PSMA-avid right iliac lymph node metastasis, without other evidence of  local recurrence or distant spread.  On 4/29/2022, he underwent surgical metastasectomy with Dr Antoine without any additional ADT at that time.  As a result, his PSA level has dropped down to 0.32 ng/mL (9/9/2022).  The PSA on 11/10/22 was 0.48 ng/mL, and the PSA on 1/10/23 was 0.70 ng/mL.       By 5/1/2023, his PSA level had again risen and reached 1.21 ng/mL.  He underwent a PSMA-PET (5/30/2023) which showed 6 small tracer-avid peritoneal metastases.  He began relugolix on 6/14/23, and received this agent for about 5 months.  His PSA dropped down to 0.05 ng/mL in 9/2023.  He went off ADT for a time, and his PSA level has increased back up to 0.95 ng/mL (1/4/24).  He then went back on relugolix, and his PSA level dropped down to a martin of 0.05 ng/mL.  He stopped the relugolix in 1/2025.  By 2/17/25, his PSA level was 0.28 ng/mL.  On 2/4/2025, he began olaparib 300 mg BID.  His recent PSA level (4/15/25) is 1.07 ng/mL, which continues to rise.  He returns today for a follow-up visit.  We conducted the encounter by video visit today.     Review of Systems:  The patient reports feeling generally well.  He has started a GLP-1 agonist agent for hyperglycemia and weight gain.  He also began olaparib on 2/4/25.  This has caused a decrease in his appetite, some nausea, and erratic bowels.  He continues to report tinnitus, and this did not improve after stopping bupropion.  He does have erectile dysfunction, which has been a problem for him since his radical prostatectomy.  He has started to experience hot flashes since beginning relugolix.  He does not report any additional new signs or symptoms at this time.  He has not had any changes in his weight.  He does not have any cancer-related pain.  A comprehensive 14-system review of symptoms is otherwise generally within normal limits.  His ECOG score is 0.  His pain score is 0/10.     Medications:  Olaparib 300 mg BID (since 2/4/25)  Rosuvastatin 10 mg  Semaglutide 0.25 mg  SQ q7d  Ondansetron 8 mg PRN  Fluticasone nasal spray  Ibuprofen PRN     Physical Examination:  Mr. Gao is a 59-year-old man who appears comfortable at rest.  His vital signs are unremarkable.  His HEENT examination is within normal limits.  There is no palpable lymphadenopathy.  The cardiac, pulmonary, and gastrointestinal examinations are within normal limits.  The neuromuscular examination is intact.  The extremities do not demonstrate pitting edema.  The skin evaluation is unremarkable.     Surgical Pathology (4/29/2022):  He underwent surgical metastasectomy of a right retroperitoneal lymph node on 4/29/22.  This showed metastatic prostatic adenocarcinoma (2.5 cm in greatest diameter) with extranodal extension.  [facility] NGS analysis showed a TMPRSS2-ETV5 fusion, a hemizygous BRCA2 deletion, a homozygous PTEN deletion, with TMB 3 muts/Mb, and gLOH 21% (high).      PSMA-PET scan (5/30/2023):  This showed six small radiotracer-avid enhancing peritoneal deposits consistent with metastases. There were no nani or osseous metastases visualized.    PSMA-PET scan (4/8/2025):  Prostatectomy bed: No abnormal uptake to suggest local recurrence.  Bones: No definite osseous uptake to suggest osseous metastatic disease.  Nani or other disease: Scattered enhancing peritoneal foci are again noted, with varying degrees of uptake, most of which have decreased compared to prior (SUV 4.6 --> 1.5), with some stable, and one stable/minimally increased (SUV 3.0 --> 3.2).  Increasing uptake corresponding to hypodense thyroid nodule within the right lobe, is unchanged in CT appearance. Uptake is nonspecific and could be inflammatory. Recommend correlation thyroid ultrasound with consideration for biopsy pending those results.     Laboratories (4/15/2025):  His PSA level is 1.07 ng/mL, which is rising.  His testosterone level is 275 ng/mL.  His CBC shows a WBC count of 4.5, hemoglobin 14.0, hematocrit 40.7%, platelets 177K.  His  comprehensive metabolic panel is entirely within normal limits, including a glucose level of 93 mg/dL.       ASSESSMENT AND PLAN:  Mr. Gao is a 59-year-old man with a history of high-risk localized prostate cancer (pT2b N0 R1, Cachorro 4+4=8, PSA 3.7 ng/mL) who underwent radical prostatectomy followed by salvage radiotherapy.  He subsequently developed a biochemical recurrence with a PSA level of 1.59 ng/mL and a PSMA-PET scan showing a solitary right iliac amy metastasis which was surgically resected on 4/29/2022.  The PSA maddy back up to 1.21 ng/mL, and he started relugolix on 6/14/2023, with a favorable treatment response after 5 months.  By 1/4/2024 the PSA level was 0.95 ng/mL, and he took relugolix again from then until 1/2025.  On 2/4/25, he began olaparib 300 mg BID.  His current PSA level is 1.07 ng/mL, which is rising despite olaparib.  His ECOG score is 0.  His pain score is 0/10.     The patient previously developed a single metastatic recurrence involving a right iliac lymph node, without evidence of additional local or distant metastatic deposits.  On 4/29/2022, he underwent successful surgical metastasectomy which confirmed metastatic prostatic adenocarcinoma.  Following resection, his PSA level dropped from 1.59 ng/mL down to 0.32 ng/mL.  However, the current PSA is back up to 1.21 ng/mL.  Since his PSA level has risen above 1.0 ng/mL, we decided to obtain another PSMA-PET scan on 5/30/23.  Unfortunately, his the PSMA scan suggested peritoneal implants which are not common in prostate cancer, but these could certainly explain his rising PSA level.  I had presented him with several options at that time. At the end of our prior conversations, we had decided to begin relugolix.  He began this treatment on 6/14/2023.  I had planned to treat him with 6-12 months of relugolix therapy, followed by a potential cessation of therapy if he achieves a complete PSA response.    However after stopping ADT  late last year, his PSA level maddy back up to 0.95 ng/mL by 1/4/2024.  At that time, I had discussed several options either involving ADT alone, or the combination of ADT plus enzalutamide consistent with the recent published results of the EMBARK study.  In addition, he could consider enzalutamide monotherapy, as per the third arm of the EMBARK study.  The patient did have an interesting genomic profile with a high-genome wide PARISH score (gLOH 21%), and thus he was interested in treatment using a PARP inhibitory such as olaparib.  However, the use of olaparib was declined by his insurance at that time.  Thus, he elected to go back on relugolix in 1/2024, which resulted in another PSA decline (0.95 --> 0.05 ng/mL).  We had planned to treat him with relugolix for a duration of one year (until 1/2025).      In 1/2025 he completed a one-year course of relugolix and stopped this medication.  On 2/4/25, he began olaparib.  However his PSA level on 3/17/25 was 0.72 ng/mL and the PSA on 4/15/25 was 1.07 ng/mL.  Fortunately, his PSMA-PET scan (4/8/25) looks relatively stable.  We have discussed several options today.  These include: continuing olaparib for one or two more months, or going back on relugolix or another form of ADT (although this might be permanent moving forward).  My advice was to stop olaparib and go back on hormonal therapy.  We will start with relugolix, and will consider adding enzalutamide or darolutamide if he has a suboptimal biochemical response.  He was in agreement with this plan.     A total of 40 minutes were spent on this patient on the date of the encounter conducting chart review, review of test results, interpretation of tests, patient visit, documentation and discussion with other provider(s).  The patient was given the opportunity to ask multiple questions today, all of which were answered to his satisfaction.    Al Morocho M.D.        Virtual Visit Details    Type of service:   Video Visit   Originating Location (pt. Location):  Home    Distant Location (provider location):  [facility]  Platform used for Video Visit:  Kerwin      Again, thank you for allowing me to participate in the care of your patient.        Sincerely,        Al Morocho MD    Electronically signed

## 2025-04-23 DIAGNOSIS — C61 MALIGNANT NEOPLASM OF PROSTATE (H): Primary | ICD-10-CM

## 2025-04-27 ENCOUNTER — DOCUMENTATION ONLY (OUTPATIENT)
Dept: ONCOLOGY | Facility: CLINIC | Age: 60
End: 2025-04-27
Payer: COMMERCIAL

## 2025-04-27 NOTE — PROGRESS NOTES
[facility] Oral Chemotherapy Monitoring Program    Thank you for the opportunity to be a part in the care of this patient's oral chemotherapy. The oncology pharmacy will no longer be following this patient for oral chemotherapy. If there are any questions or the plan changes, feel free to contact us.        2/10/2025     8:00 AM 2/17/2025     3:00 PM 2/27/2025    12:00 PM 3/18/2025    11:00 AM 3/28/2025    12:00 PM 4/15/2025     2:00 PM 4/27/2025     1:00 PM   ORAL CHEMOTHERAPY   Assessment Type Initial Follow up Lab Monitoring Refill Lab Monitoring;Monthly Follow up Refill Lab Monitoring;Monthly Follow up Discontinuation   Stop Date       4/21/2025   Reason for Discontinuation       Disease progression   Diagnosis Code Prostate Cancer Prostate Cancer Prostate Cancer Prostate Cancer Prostate Cancer Prostate Cancer Prostate Cancer   Providers Bailee Morocho   [facility]   Is this patient followed by the [facility] OC team? No No No No No No No   Drug Name Lynparza (olaparib) Lynparza (olaparib) Lynparza (olaparib) Lynparza (olaparib) Lynparza (olaparib) Lynparza (olaparib) Lynparza (olaparib)   Dose 300 mg 300 mg 300 mg 300 mg 300 mg 300 mg 300 mg   Current Schedule BID BID BID BID BID BID BID   Cycle Details Continuous Continuous Continuous Continuous Continuous Continuous Continuous   Start Date of Last Cycle 2/4/2025         Doses missed in last 2 weeks 0   0  0    Adherence Assessment Adherent   Adherent  Adherent    Adverse Effects Nausea;Diarrhea;Decreased Appetite/Weight Loss   Nausea;Diarrhea;Fatigue;Decreased Appetite/Weight Loss  Constipation    Nausea Grade 1   Grade 1  Grade 1    Pharmacist Intervention(nausea) No   No  No    Constipation      Grade 2    Pharmacist Intervention(constipation)      No    Diarrhea Grade 1   Grade 1       Pharmacist Intervention(diarrhea) No   Yes      Intervention(s)    Patient education      Decrease Appetite/Weight Loss Grade 1   Grade 1      Pharmacist Intervention(decreased appetite/weight loss) No   Yes      Intervention(s)    Patient education      Fatigue    Grade 1  Grade 2    Pharmacist Intervention(fatigue)    Yes  Yes    Intervention(s)    Patient education  Patient education    Any new drug interactions? No         Is the dose as ordered appropriate for the patient? Yes         Has the patient missed any days of school, work, or other routine activity? No         Since the last time we talked, have you been hospitalized or used the emergency room? No         Was a medication prescribed as part of a CPA? No             Haydee Carbajal  Pharmacy Intern  Oral Chemotherapy Monitoring Program  [facility]   [phone]

## 2025-05-19 ENCOUNTER — LAB (OUTPATIENT)
Dept: LAB | Facility: CLINIC | Age: 60
End: 2025-05-19
Payer: COMMERCIAL

## 2025-05-19 DIAGNOSIS — C61 MALIGNANT NEOPLASM OF PROSTATE (H): ICD-10-CM

## 2025-05-19 DIAGNOSIS — Z79.899 ENCOUNTER FOR LONG-TERM (CURRENT) USE OF MEDICATIONS: ICD-10-CM

## 2025-05-19 LAB
ALBUMIN SERPL BCG-MCNC: 4.4 G/DL (ref 3.5–5.2)
ALP SERPL-CCNC: 94 U/L (ref 40–150)
ALT SERPL W P-5'-P-CCNC: 49 U/L (ref 0–70)
ANION GAP SERPL CALCULATED.3IONS-SCNC: 10 MMOL/L (ref 7–15)
AST SERPL W P-5'-P-CCNC: 28 U/L (ref 0–45)
BASOPHILS # BLD AUTO: 0.1 10E3/UL (ref 0–0.2)
BASOPHILS NFR BLD AUTO: 1 %
BILIRUB SERPL-MCNC: 0.4 MG/DL
BUN SERPL-MCNC: 13.6 MG/DL (ref 8–23)
CALCIUM SERPL-MCNC: 10.2 MG/DL (ref 8.8–10.4)
CHLORIDE SERPL-SCNC: 105 MMOL/L (ref 98–107)
CREAT SERPL-MCNC: 1.04 MG/DL (ref 0.67–1.17)
EGFRCR SERPLBLD CKD-EPI 2021: 82 ML/MIN/1.73M2
EOSINOPHIL # BLD AUTO: 0.3 10E3/UL (ref 0–0.7)
EOSINOPHIL NFR BLD AUTO: 5 %
ERYTHROCYTE [DISTWIDTH] IN BLOOD BY AUTOMATED COUNT: 14 % (ref 10–15)
GLUCOSE SERPL-MCNC: 94 MG/DL (ref 70–99)
HCO3 SERPL-SCNC: 24 MMOL/L (ref 22–29)
HCT VFR BLD AUTO: 40.8 % (ref 40–53)
HGB BLD-MCNC: 14 G/DL (ref 13.3–17.7)
IMM GRANULOCYTES # BLD: 0 10E3/UL
IMM GRANULOCYTES NFR BLD: 1 %
LYMPHOCYTES # BLD AUTO: 1.2 10E3/UL (ref 0.8–5.3)
LYMPHOCYTES NFR BLD AUTO: 26 %
MCH RBC QN AUTO: 32 PG (ref 26.5–33)
MCHC RBC AUTO-ENTMCNC: 34.3 G/DL (ref 31.5–36.5)
MCV RBC AUTO: 93 FL (ref 78–100)
MONOCYTES # BLD AUTO: 0.4 10E3/UL (ref 0–1.3)
MONOCYTES NFR BLD AUTO: 8 %
NEUTROPHILS # BLD AUTO: 2.7 10E3/UL (ref 1.6–8.3)
NEUTROPHILS NFR BLD AUTO: 59 %
NRBC # BLD AUTO: 0 10E3/UL
NRBC BLD AUTO-RTO: 0 /100
PLATELET # BLD AUTO: 191 10E3/UL (ref 150–450)
POTASSIUM SERPL-SCNC: 4.5 MMOL/L (ref 3.4–5.3)
PROT SERPL-MCNC: 7 G/DL (ref 6.4–8.3)
PSA SERPL DL<=0.01 NG/ML-MCNC: 0.25 NG/ML (ref 0–4.5)
RBC # BLD AUTO: 4.37 10E6/UL (ref 4.4–5.9)
SODIUM SERPL-SCNC: 139 MMOL/L (ref 135–145)
WBC # BLD AUTO: 4.6 10E3/UL (ref 4–11)

## 2025-05-19 PROCEDURE — 85025 COMPLETE CBC W/AUTO DIFF WBC: CPT | Performed by: PATHOLOGY

## 2025-05-19 PROCEDURE — 36415 COLL VENOUS BLD VENIPUNCTURE: CPT | Performed by: PATHOLOGY

## 2025-05-19 PROCEDURE — 84403 ASSAY OF TOTAL TESTOSTERONE: CPT | Performed by: INTERNAL MEDICINE

## 2025-05-19 PROCEDURE — 99000 SPECIMEN HANDLING OFFICE-LAB: CPT | Performed by: PATHOLOGY

## 2025-05-19 PROCEDURE — 80053 COMPREHEN METABOLIC PANEL: CPT | Performed by: PATHOLOGY

## 2025-05-19 PROCEDURE — 84153 ASSAY OF PSA TOTAL: CPT | Performed by: PATHOLOGY

## 2025-05-21 LAB — TESTOST SERPL-MCNC: 20 NG/DL (ref 240–950)

## 2025-06-10 ENCOUNTER — LAB (OUTPATIENT)
Dept: LAB | Facility: CLINIC | Age: 60
End: 2025-06-10
Payer: COMMERCIAL

## 2025-06-10 DIAGNOSIS — Z79.899 ENCOUNTER FOR LONG-TERM (CURRENT) USE OF MEDICATIONS: ICD-10-CM

## 2025-06-10 DIAGNOSIS — C61 MALIGNANT NEOPLASM OF PROSTATE (H): ICD-10-CM

## 2025-06-10 LAB
ALBUMIN SERPL BCG-MCNC: 4.7 G/DL (ref 3.5–5.2)
ALP SERPL-CCNC: 93 U/L (ref 40–150)
ALT SERPL W P-5'-P-CCNC: 37 U/L (ref 0–70)
ANION GAP SERPL CALCULATED.3IONS-SCNC: 11 MMOL/L (ref 7–15)
AST SERPL W P-5'-P-CCNC: 26 U/L (ref 0–45)
BASOPHILS # BLD AUTO: 0 10E3/UL (ref 0–0.2)
BASOPHILS NFR BLD AUTO: 1 %
BILIRUB SERPL-MCNC: 0.5 MG/DL
BUN SERPL-MCNC: 14.3 MG/DL (ref 8–23)
CALCIUM SERPL-MCNC: 10 MG/DL (ref 8.8–10.4)
CHLORIDE SERPL-SCNC: 106 MMOL/L (ref 98–107)
CREAT SERPL-MCNC: 0.96 MG/DL (ref 0.67–1.17)
EGFRCR SERPLBLD CKD-EPI 2021: 90 ML/MIN/1.73M2
EOSINOPHIL # BLD AUTO: 0.1 10E3/UL (ref 0–0.7)
EOSINOPHIL NFR BLD AUTO: 3 %
ERYTHROCYTE [DISTWIDTH] IN BLOOD BY AUTOMATED COUNT: 13.2 % (ref 10–15)
GLUCOSE SERPL-MCNC: 111 MG/DL (ref 70–99)
HCO3 SERPL-SCNC: 22 MMOL/L (ref 22–29)
HCT VFR BLD AUTO: 41.6 % (ref 40–53)
HGB BLD-MCNC: 14.7 G/DL (ref 13.3–17.7)
IMM GRANULOCYTES # BLD: 0 10E3/UL
IMM GRANULOCYTES NFR BLD: 1 %
LYMPHOCYTES # BLD AUTO: 1.4 10E3/UL (ref 0.8–5.3)
LYMPHOCYTES NFR BLD AUTO: 28 %
MCH RBC QN AUTO: 32 PG (ref 26.5–33)
MCHC RBC AUTO-ENTMCNC: 35.3 G/DL (ref 31.5–36.5)
MCV RBC AUTO: 91 FL (ref 78–100)
MONOCYTES # BLD AUTO: 0.3 10E3/UL (ref 0–1.3)
MONOCYTES NFR BLD AUTO: 7 %
NEUTROPHILS # BLD AUTO: 2.9 10E3/UL (ref 1.6–8.3)
NEUTROPHILS NFR BLD AUTO: 61 %
NRBC # BLD AUTO: 0 10E3/UL
NRBC BLD AUTO-RTO: 0 /100
PLATELET # BLD AUTO: 200 10E3/UL (ref 150–450)
POTASSIUM SERPL-SCNC: 4.1 MMOL/L (ref 3.4–5.3)
PROT SERPL-MCNC: 7.3 G/DL (ref 6.4–8.3)
PSA SERPL DL<=0.01 NG/ML-MCNC: 0.22 NG/ML (ref 0–4.5)
RBC # BLD AUTO: 4.59 10E6/UL (ref 4.4–5.9)
SODIUM SERPL-SCNC: 139 MMOL/L (ref 135–145)
WBC # BLD AUTO: 4.8 10E3/UL (ref 4–11)

## 2025-06-10 PROCEDURE — 85025 COMPLETE CBC W/AUTO DIFF WBC: CPT | Performed by: PATHOLOGY

## 2025-06-10 PROCEDURE — 84403 ASSAY OF TOTAL TESTOSTERONE: CPT | Performed by: INTERNAL MEDICINE

## 2025-06-10 PROCEDURE — 80053 COMPREHEN METABOLIC PANEL: CPT | Performed by: PATHOLOGY

## 2025-06-10 PROCEDURE — 36415 COLL VENOUS BLD VENIPUNCTURE: CPT | Performed by: PATHOLOGY

## 2025-06-10 PROCEDURE — 84153 ASSAY OF PSA TOTAL: CPT | Performed by: PATHOLOGY

## 2025-06-10 PROCEDURE — 99000 SPECIMEN HANDLING OFFICE-LAB: CPT | Performed by: PATHOLOGY

## 2025-06-12 LAB — TESTOST SERPL-MCNC: 31 NG/DL (ref 240–950)

## 2025-06-22 NOTE — PROGRESS NOTES
Virtual Visit Details    Type of service:  Video Visit   Originating Location (pt. Location):  Home    Distant Location (provider location):  [facility]  Platform used for Video Visit:  Kerwin    -------------------------------------------------------------------------------------------------------------------------------------------------     FOLLOW UP VISIT  -  TELEMEDICINE        Reason for Visit:  Biochemically-recurrent prostate cancer with PSMA-positive peritoneal deposits, back on relugolix (since 4/2025).     History of Present Illness:  Mr. Gao is a 59-year old man who was diagnosed with prostate cancer in 10/2010, at age 45.  His PSA level at that time was 3.7 ng/mL.  He underwent a radical prostatectomy on 12/6/2010, which revealed prostatic acinar adenocarcinoma, Cachorro 4+4=8, with organ-confined disease, no extraprostatic extension or seminal vesicle invasion, 0/16 positive lymph nodes, but he did have a positive apical surgical margin.  His final pathological stage was pT2b N0 R1.  [facility] NGS analysis revealed a TMPRSS2-ETV5 fusion, a hemizygous BRCA2 deletion, and a homozygous PTEN deletion, with TMB 3 muts/Mb, and gLOH 21% (high).    Following surgery, he developed a biochemical recurrence with a PSA level reaching 0.34 ng/mL by 8/2013.  He was then treated with salvage external-beam radiotherapy using 7020 cGy over 39 fractions, ending in 12/2013.  He also received concurrent androgen deprivation therapy for 12 months, ending in 9/2014.  His PSA level then remained undetectable for approximately 6 years.  Unfortunately, in 3/2020 he developed a further biochemical recurrence.  His PSA on 3/3/2020 was 0.2, the PSA on 11/20/2020 was 0.3, the PSA on 4/23/2021 was 0.6, and the PSA on 2/17/2022 was 1.59 ng/mL.  The patient then underwent a PSMA-PET scan on 2/17/2022.  This showed a PSMA-avid right iliac lymph node metastasis, without other evidence of local recurrence  or distant spread.  On 4/29/2022, he underwent surgical metastasectomy with Dr Antoine without any additional ADT at that time.  As a result, his PSA level has dropped down to 0.32 ng/mL (9/9/2022).  The PSA on 11/10/22 was 0.48 ng/mL, and the PSA on 1/10/23 was 0.70 ng/mL.      By 5/1/2023, his PSA level had again risen and reached 1.21 ng/mL.  He underwent a PSMA-PET (5/30/2023) which showed 6 small tracer-avid peritoneal metastases.  He began relugolix on 6/14/23, and received this agent for about 5 months.  His PSA dropped down to 0.05 ng/mL in 9/2023.  He went off ADT for a time, and his PSA level has increased back up to 0.95 ng/mL (1/4/24).  He then went back on relugolix, and his PSA level dropped down to a martin of 0.05 ng/mL.  He stopped the relugolix in 1/2025.  By 2/17/25, his PSA level was 0.28 ng/mL.  On 2/4/2025, he began olaparib 300 mg BID.  His PSA level on 4/15/25 was 1.07 ng/mL, which continued to rise.  In 4/2025, he stopped olaparib and began relugolix.  As a result, his PSA level has dropped down to 0.22 ng/mL.  He returns today for a follow-up visit.  We conducted the encounter by video visit today.    Review of Systems:  The patient reports feeling generally well.  He has started a GLP-1 agonist agent for hyperglycemia and weight gain.  He began olaparib on 4/16/25.  This has caused a decrease in his appetite, some nausea, and erratic bowels.  He continues to report tinnitus, and this did not improve after stopping bupropion.  He does have erectile dysfunction, which has been a problem for him since his radical prostatectomy.  He has started to experience hot flashes since beginning relugolix.  He does not report any additional new signs or symptoms at this time.  He has not had any changes in his weight.  He does not have any cancer-related pain.  A comprehensive 14-system review of symptoms is otherwise generally within normal limits.  His ECOG score is 0.  His pain score is 0/10.      Medications:  Relugolix 120 mg (since 4/16/25)  Rosuvastatin 10 mg  Semaglutide 0.25 mg SQ q7d  Ondansetron 8 mg PRN  Fluticasone nasal spray  Ibuprofen PRN     Physical Examination:  Mr. Gao is a 59-year-old man who appears comfortable at rest.  His vital signs are unremarkable.  His HEENT examination is within normal limits.  There is no palpable lymphadenopathy.  The cardiac, pulmonary, and gastrointestinal examinations are within normal limits.  The neuromuscular examination is intact.  The extremities do not demonstrate pitting edema.  The skin evaluation is unremarkable.     Surgical Pathology (4/29/2022):  He underwent surgical metastasectomy of a right retroperitoneal lymph node on 4/29/22.  This showed metastatic prostatic adenocarcinoma (2.5 cm in greatest diameter) with extranodal extension.  [facility] NGS analysis showed a TMPRSS2-ETV5 fusion, a hemizygous BRCA2 deletion, a homozygous PTEN deletion, with TMB 3 muts/Mb, and gLOH 21% (high).      PSMA-PET scan (5/30/2023):  This showed six small radiotracer-avid enhancing peritoneal deposits consistent with metastases. There were no nani or osseous metastases visualized.    PSMA-PET scan (4/8/2025):  Prostatectomy bed: No abnormal uptake to suggest local recurrence.  Bones: No definite osseous uptake to suggest osseous metastatic disease.  Nani or other disease: Scattered enhancing peritoneal foci are again noted, with varying degrees of uptake, most of which have decreased compared to prior (SUV 4.6 --> 1.5), with some stable, and one stable/minimally increased (SUV 3.0 --> 3.2).  Increasing uptake corresponding to hypodense thyroid nodule within the right lobe, is unchanged in CT appearance. Uptake is nonspecific and could be inflammatory. Recommend correlation thyroid ultrasound with consideration for biopsy pending those results.     Laboratories (6/10/2025):  His PSA level is 0.22 ng/mL, which is declining.  His testosterone level is 31 ng/mL.   His CBC shows a WBC count of 4.8, hemoglobin 14.7, hematocrit 41.6%, platelets 200K.  His comprehensive metabolic panel is entirely within normal limits, including a glucose level of 111 mg/dL.       ASSESSMENT AND PLAN:  Mr. Gao is a 59-year-old man with a history of high-risk localized prostate cancer (pT2b N0 R1, [place] 4+4=8, PSA 3.7 ng/mL) who underwent radical prostatectomy followed by salvage radiotherapy.  He subsequently developed a biochemical recurrence with a PSA level of 1.59 ng/mL and a PSMA-PET scan showing a solitary right iliac amy metastasis which was surgically resected on 4/29/2022.  The PSA maddy back up to 1.21 ng/mL, and he started relugolix on 6/14/2023, with a favorable treatment response after 5 months.  By 1/4/2024 the PSA level was 0.95 ng/mL, and he took relugolix again from then until 1/2025.  On 4/16/25, he began relugolix 120 mg.  His current PSA level is 0.22 ng/mL, which is declining.  His ECOG score is 0.  His pain score is 0/10.     The patient previously developed a single metastatic recurrence involving a right iliac lymph node, without evidence of additional local or distant metastatic deposits.  On 4/29/2022, he underwent successful surgical metastasectomy which confirmed metastatic prostatic adenocarcinoma.  Following resection, his PSA level dropped from 1.59 ng/mL down to 0.32 ng/mL.  However, the current PSA is back up to 1.21 ng/mL.  Since his PSA level has risen above 1.0 ng/mL, we decided to obtain another PSMA-PET scan on 5/30/23.  Unfortunately, his the PSMA scan suggested peritoneal implants which are not common in prostate cancer, but these could certainly explain his rising PSA level.  I had presented him with several options at that time. At the end of our prior conversations, we had decided to begin relugolix.  He began this treatment on 6/14/2023.  I had planned to treat him with 6-12 months of relugolix therapy, followed by a potential cessation of  therapy if he achieves a complete PSA response.    However after stopping ADT late last year, his PSA level maddy back up to 0.95 ng/mL by 1/4/2024.  At that time, I had discussed several options either involving ADT alone, or the combination of ADT plus enzalutamide consistent with the recent published results of the EMBARK study.  In addition, he could consider enzalutamide monotherapy, as per the third arm of the EMBARK study.  The patient did have an interesting genomic profile with a high-genome wide PARISH score (gLOH 21%), and thus he was interested in treatment using a PARP inhibitory such as olaparib.  However, the use of olaparib was declined by his insurance at that time.  Thus, he elected to go back on relugolix in 1/2024, which resulted in another PSA decline (0.95 --> 0.05 ng/mL).  We had planned to treat him with relugolix for a duration of one year (until 1/2025).      In 1/2025 he completed a one-year course of relugolix and stopped this medication.  On 2/4/25, he began olaparib.  However his PSA level on 3/17/25 was 0.72 ng/mL and the PSA on 4/15/25 was 1.07 ng/mL.  Fortunately, his PSMA-PET scan (4/8/25) looked relatively stable.  We have discussed several options previously.  These include: continuing olaparib for one or two more months, or going back on relugolix or another form of ADT (although this might be permanent moving forward).  My advice was to stop olaparib and go back on hormonal therapy.  He re-started relugolix on 4/16/2025, and we will consider adding enzalutamide or darolutamide if he has a suboptimal biochemical response.  He is in agreement with this plan.     A total of 40 minutes were spent on this patient on the date of the encounter conducting chart review, review of test results, interpretation of tests, patient visit, documentation and discussion with other provider(s).  The patient was given the opportunity to ask multiple questions today, all of which were answered to his  satisfaction.    Al Mroocho M.D.

## 2025-06-23 ENCOUNTER — VIRTUAL VISIT (OUTPATIENT)
Dept: ONCOLOGY | Facility: CLINIC | Age: 60
End: 2025-06-23
Attending: INTERNAL MEDICINE
Payer: COMMERCIAL

## 2025-06-23 VITALS — HEIGHT: 71 IN | WEIGHT: 220 LBS | BODY MASS INDEX: 30.8 KG/M2

## 2025-06-23 DIAGNOSIS — C61 MALIGNANT NEOPLASM OF PROSTATE (H): Primary | ICD-10-CM

## 2025-06-23 PROCEDURE — 98007 SYNCH AUDIO-VIDEO EST HI 40: CPT | Performed by: INTERNAL MEDICINE

## 2025-06-23 PROCEDURE — 1126F AMNT PAIN NOTED NONE PRSNT: CPT | Performed by: INTERNAL MEDICINE

## 2025-06-23 ASSESSMENT — PAIN SCALES - GENERAL: PAINLEVEL_OUTOF10: NO PAIN (0)

## 2025-06-23 NOTE — NURSING NOTE
Patient confirms medications and allergies are accurate via patients echeck in completion, and or denies any changes since last reviewed/verified.     Current patient location: [address] [facility] [address]    Is the patient currently in the state of [place]? YES    Visit mode: VIDEO    If the visit is dropped, the patient can be reconnected by:VIDEO VISIT: Text to cell phone:   Telephone Information:   Mobile [phone]       Will anyone else be joining the visit?   Hugo Same Location  (If patient encounters technical issues they should call [phone]    Are changes needed to the allergy or medication list? No    Are refills needed on medications prescribed by this physician? NO    Rooming Documentation:  Questionnaire(s) not done per department protocol    Reason for visit: RECHECK    Krystina NIEVES

## 2025-06-23 NOTE — LETTER
6/23/2025      Duglas Gao  [address] [facility] [address]      Dear Colleague,    Thank you for referring your patient, Duglas Gao, to the [facility]. Please see a copy of my visit note below.      Virtual Visit Details    Type of service:  Video Visit   Originating Location (pt. Location):  Home    Distant Location (provider location):  [facility] [place]  Platform used for Video Visit:  AmWell    -------------------------------------------------------------------------------------------------------------------------------------------------     FOLLOW UP VISIT  -  TELEMEDICINE        Reason for Visit:  Biochemically-recurrent prostate cancer with PSMA-positive peritoneal deposits, back on relugolix (since 4/2025).     History of Present Illness:  Mr. Gao is a 59-year old man who was diagnosed with prostate cancer in 10/2010, at age 45.  His PSA level at that time was 3.7 ng/mL.  He underwent a radical prostatectomy on 12/6/2010, which revealed prostatic acinar adenocarcinoma, [place] 4+4=8, with organ-confined disease, no extraprostatic extension or seminal vesicle invasion, 0/16 positive lymph nodes, but he did have a positive apical surgical margin.  His final pathological stage was pT2b N0 R1.  [facility] NGS analysis revealed a TMPRSS2-ETV5 fusion, a hemizygous BRCA2 deletion, and a homozygous PTEN deletion, with TMB 3 muts/Mb, and gLOH 21% (high).    Following surgery, he developed a biochemical recurrence with a PSA level reaching 0.34 ng/mL by 8/2013.  He was then treated with salvage external-beam radiotherapy using 7020 cGy over 39 fractions, ending in 12/2013.  He also received concurrent androgen deprivation therapy for 12 months, ending in 9/2014.  His PSA level then remained undetectable for approximately 6 years.  Unfortunately, in 3/2020 he developed a further biochemical recurrence.  His PSA on 3/3/2020 was 0.2, the PSA on  11/20/2020 was 0.3, the PSA on 4/23/2021 was 0.6, and the PSA on 2/17/2022 was 1.59 ng/mL.  The patient then underwent a PSMA-PET scan on 2/17/2022.  This showed a PSMA-avid right iliac lymph node metastasis, without other evidence of local recurrence or distant spread.  On 4/29/2022, he underwent surgical metastasectomy with Dr Antoine without any additional ADT at that time.  As a result, his PSA level has dropped down to 0.32 ng/mL (9/9/2022).  The PSA on 11/10/22 was 0.48 ng/mL, and the PSA on 1/10/23 was 0.70 ng/mL.      By 5/1/2023, his PSA level had again risen and reached 1.21 ng/mL.  He underwent a PSMA-PET (5/30/2023) which showed 6 small tracer-avid peritoneal metastases.  He began relugolix on 6/14/23, and received this agent for about 5 months.  His PSA dropped down to 0.05 ng/mL in 9/2023.  He went off ADT for a time, and his PSA level has increased back up to 0.95 ng/mL (1/4/24).  He then went back on relugolix, and his PSA level dropped down to a martin of 0.05 ng/mL.  He stopped the relugolix in 1/2025.  By 2/17/25, his PSA level was 0.28 ng/mL.  On 2/4/2025, he began olaparib 300 mg BID.  His PSA level on 4/15/25 was 1.07 ng/mL, which continued to rise.  In 4/2025, he stopped olaparib and began relugolix.  As a result, his PSA level has dropped down to 0.22 ng/mL.  He returns today for a follow-up visit.  We conducted the encounter by video visit today.    Review of Systems:  The patient reports feeling generally well.  He has started a GLP-1 agonist agent for hyperglycemia and weight gain.  He began olaparib on 4/16/25.  This has caused a decrease in his appetite, some nausea, and erratic bowels.  He continues to report tinnitus, and this did not improve after stopping bupropion.  He does have erectile dysfunction, which has been a problem for him since his radical prostatectomy.  He has started to experience hot flashes since beginning relugolix.  He does not report any additional new signs or  symptoms at this time.  He has not had any changes in his weight.  He does not have any cancer-related pain.  A comprehensive 14-system review of symptoms is otherwise generally within normal limits.  His ECOG score is 0.  His pain score is 0/10.     Medications:  Relugolix 120 mg (since 4/16/25)  Rosuvastatin 10 mg  Semaglutide 0.25 mg SQ q7d  Ondansetron 8 mg PRN  Fluticasone nasal spray  Ibuprofen PRN     Physical Examination:  Mr. Gao is a 59-year-old man who appears comfortable at rest.  His vital signs are unremarkable.  His HEENT examination is within normal limits.  There is no palpable lymphadenopathy.  The cardiac, pulmonary, and gastrointestinal examinations are within normal limits.  The neuromuscular examination is intact.  The extremities do not demonstrate pitting edema.  The skin evaluation is unremarkable.     Surgical Pathology (4/29/2022):  He underwent surgical metastasectomy of a right retroperitoneal lymph node on 4/29/22.  This showed metastatic prostatic adenocarcinoma (2.5 cm in greatest diameter) with extranodal extension.  [facility] NGS analysis showed a TMPRSS2-ETV5 fusion, a hemizygous BRCA2 deletion, a homozygous PTEN deletion, with TMB 3 muts/Mb, and gLOH 21% (high).      PSMA-PET scan (5/30/2023):  This showed six small radiotracer-avid enhancing peritoneal deposits consistent with metastases. There were no nani or osseous metastases visualized.    PSMA-PET scan (4/8/2025):  Prostatectomy bed: No abnormal uptake to suggest local recurrence.  Bones: No definite osseous uptake to suggest osseous metastatic disease.  Nani or other disease: Scattered enhancing peritoneal foci are again noted, with varying degrees of uptake, most of which have decreased compared to prior (SUV 4.6 --> 1.5), with some stable, and one stable/minimally increased (SUV 3.0 --> 3.2).  Increasing uptake corresponding to hypodense thyroid nodule within the right lobe, is unchanged in CT appearance. Uptake is  nonspecific and could be inflammatory. Recommend correlation thyroid ultrasound with consideration for biopsy pending those results.     Laboratories (6/10/2025):  His PSA level is 0.22 ng/mL, which is declining.  His testosterone level is 31 ng/mL.  His CBC shows a WBC count of 4.8, hemoglobin 14.7, hematocrit 41.6%, platelets 200K.  His comprehensive metabolic panel is entirely within normal limits, including a glucose level of 111 mg/dL.       ASSESSMENT AND PLAN:  Mr. Gao is a 59-year-old man with a history of high-risk localized prostate cancer (pT2b N0 R1, [place] 4+4=8, PSA 3.7 ng/mL) who underwent radical prostatectomy followed by salvage radiotherapy.  He subsequently developed a biochemical recurrence with a PSA level of 1.59 ng/mL and a PSMA-PET scan showing a solitary right iliac amy metastasis which was surgically resected on 4/29/2022.  The PSA maddy back up to 1.21 ng/mL, and he started relugolix on 6/14/2023, with a favorable treatment response after 5 months.  By 1/4/2024 the PSA level was 0.95 ng/mL, and he took relugolix again from then until 1/2025.  On 4/16/25, he began relugolix 120 mg.  His current PSA level is 0.22 ng/mL, which is declining.  His ECOG score is 0.  His pain score is 0/10.     The patient previously developed a single metastatic recurrence involving a right iliac lymph node, without evidence of additional local or distant metastatic deposits.  On 4/29/2022, he underwent successful surgical metastasectomy which confirmed metastatic prostatic adenocarcinoma.  Following resection, his PSA level dropped from 1.59 ng/mL down to 0.32 ng/mL.  However, the current PSA is back up to 1.21 ng/mL.  Since his PSA level has risen above 1.0 ng/mL, we decided to obtain another PSMA-PET scan on 5/30/23.  Unfortunately, his the PSMA scan suggested peritoneal implants which are not common in prostate cancer, but these could certainly explain his rising PSA level.  I had presented him with  several options at that time. At the end of our prior conversations, we had decided to begin relugolix.  He began this treatment on 6/14/2023.  I had planned to treat him with 6-12 months of relugolix therapy, followed by a potential cessation of therapy if he achieves a complete PSA response.    However after stopping ADT late last year, his PSA level maddy back up to 0.95 ng/mL by 1/4/2024.  At that time, I had discussed several options either involving ADT alone, or the combination of ADT plus enzalutamide consistent with the recent published results of the EMBARK study.  In addition, he could consider enzalutamide monotherapy, as per the third arm of the EMBARK study.  The patient did have an interesting genomic profile with a high-genome wide PARISH score (gLOH 21%), and thus he was interested in treatment using a PARP inhibitory such as olaparib.  However, the use of olaparib was declined by his insurance at that time.  Thus, he elected to go back on relugolix in 1/2024, which resulted in another PSA decline (0.95 --> 0.05 ng/mL).  We had planned to treat him with relugolix for a duration of one year (until 1/2025).      In 1/2025 he completed a one-year course of relugolix and stopped this medication.  On 2/4/25, he began olaparib.  However his PSA level on 3/17/25 was 0.72 ng/mL and the PSA on 4/15/25 was 1.07 ng/mL.  Fortunately, his PSMA-PET scan (4/8/25) looked relatively stable.  We have discussed several options previously.  These include: continuing olaparib for one or two more months, or going back on relugolix or another form of ADT (although this might be permanent moving forward).  My advice was to stop olaparib and go back on hormonal therapy.  He re-started relugolix on 4/16/2025, and we will consider adding enzalutamide or darolutamide if he has a suboptimal biochemical response.  He is in agreement with this plan.     A total of 40 minutes were spent on this patient on the date of the encounter  conducting chart review, review of test results, interpretation of tests, patient visit, documentation and discussion with other provider(s).  The patient was given the opportunity to ask multiple questions today, all of which were answered to his satisfaction.    Al Morocho M.D.      Again, thank you for allowing me to participate in the care of your patient.        Sincerely,        Al Morocho MD    Electronically signed

## 2025-07-28 ENCOUNTER — LAB (OUTPATIENT)
Dept: LAB | Facility: CLINIC | Age: 60
End: 2025-07-28
Payer: COMMERCIAL

## 2025-07-28 DIAGNOSIS — Z79.899 ENCOUNTER FOR LONG-TERM (CURRENT) USE OF MEDICATIONS: ICD-10-CM

## 2025-07-28 DIAGNOSIS — C61 MALIGNANT NEOPLASM OF PROSTATE (H): ICD-10-CM

## 2025-07-28 LAB
ALBUMIN SERPL BCG-MCNC: 4.6 G/DL (ref 3.5–5.2)
ALP SERPL-CCNC: 91 U/L (ref 40–150)
ALT SERPL W P-5'-P-CCNC: 55 U/L (ref 0–70)
ANION GAP SERPL CALCULATED.3IONS-SCNC: 11 MMOL/L (ref 7–15)
AST SERPL W P-5'-P-CCNC: 36 U/L (ref 0–45)
BASOPHILS # BLD AUTO: 0.1 10E3/UL (ref 0–0.2)
BASOPHILS NFR BLD AUTO: 1 %
BILIRUB SERPL-MCNC: 0.5 MG/DL
BUN SERPL-MCNC: 11.6 MG/DL (ref 8–23)
CALCIUM SERPL-MCNC: 10.3 MG/DL (ref 8.8–10.4)
CHLORIDE SERPL-SCNC: 104 MMOL/L (ref 98–107)
CREAT SERPL-MCNC: 1.02 MG/DL (ref 0.67–1.17)
EGFRCR SERPLBLD CKD-EPI 2021: 84 ML/MIN/1.73M2
EOSINOPHIL # BLD AUTO: 0.1 10E3/UL (ref 0–0.7)
EOSINOPHIL NFR BLD AUTO: 3 %
ERYTHROCYTE [DISTWIDTH] IN BLOOD BY AUTOMATED COUNT: 12.4 % (ref 10–15)
GLUCOSE SERPL-MCNC: 116 MG/DL (ref 70–99)
HCO3 SERPL-SCNC: 24 MMOL/L (ref 22–29)
HCT VFR BLD AUTO: 42.2 % (ref 40–53)
HGB BLD-MCNC: 14.7 G/DL (ref 13.3–17.7)
IMM GRANULOCYTES # BLD: 0 10E3/UL
IMM GRANULOCYTES NFR BLD: 1 %
LYMPHOCYTES # BLD AUTO: 1.4 10E3/UL (ref 0.8–5.3)
LYMPHOCYTES NFR BLD AUTO: 32 %
MCH RBC QN AUTO: 31 PG (ref 26.5–33)
MCHC RBC AUTO-ENTMCNC: 34.8 G/DL (ref 31.5–36.5)
MCV RBC AUTO: 89 FL (ref 78–100)
MONOCYTES # BLD AUTO: 0.4 10E3/UL (ref 0–1.3)
MONOCYTES NFR BLD AUTO: 8 %
NEUTROPHILS # BLD AUTO: 2.5 10E3/UL (ref 1.6–8.3)
NEUTROPHILS NFR BLD AUTO: 56 %
NRBC # BLD AUTO: 0 10E3/UL
NRBC BLD AUTO-RTO: 0 /100
PLATELET # BLD AUTO: 169 10E3/UL (ref 150–450)
POTASSIUM SERPL-SCNC: 4.5 MMOL/L (ref 3.4–5.3)
PROT SERPL-MCNC: 7.4 G/DL (ref 6.4–8.3)
PSA SERPL DL<=0.01 NG/ML-MCNC: 0.11 NG/ML (ref 0–4.5)
RBC # BLD AUTO: 4.74 10E6/UL (ref 4.4–5.9)
SODIUM SERPL-SCNC: 139 MMOL/L (ref 135–145)
WBC # BLD AUTO: 4.5 10E3/UL (ref 4–11)

## 2025-07-28 PROCEDURE — 80053 COMPREHEN METABOLIC PANEL: CPT | Performed by: PATHOLOGY

## 2025-07-28 PROCEDURE — 84403 ASSAY OF TOTAL TESTOSTERONE: CPT | Performed by: INTERNAL MEDICINE

## 2025-07-28 PROCEDURE — 99000 SPECIMEN HANDLING OFFICE-LAB: CPT | Performed by: PATHOLOGY

## 2025-07-28 PROCEDURE — 85025 COMPLETE CBC W/AUTO DIFF WBC: CPT | Performed by: PATHOLOGY

## 2025-07-28 PROCEDURE — 36415 COLL VENOUS BLD VENIPUNCTURE: CPT | Performed by: PATHOLOGY

## 2025-07-28 PROCEDURE — 84153 ASSAY OF PSA TOTAL: CPT | Performed by: PATHOLOGY

## 2025-07-31 LAB — TESTOST SERPL-MCNC: 21 NG/DL (ref 240–950)

## 2025-08-12 DIAGNOSIS — C61 MALIGNANT NEOPLASM OF PROSTATE (H): ICD-10-CM

## 2025-08-14 RX ORDER — RELUGOLIX 120 MG/1
120 TABLET, FILM COATED ORAL DAILY
Qty: 30 TABLET | Refills: 3 | OUTPATIENT
Start: 2025-08-14

## (undated) DEVICE — DAVINCI XI DRIVER NDL LARGE 8MM EXT 471006

## (undated) DEVICE — SURGICEL HEMOSTAT 4X8" 1952

## (undated) DEVICE — SUCTION MANIFOLD NEPTUNE 2 SYS 4 PORT 0702-020-000

## (undated) DEVICE — CLIP ENDO HEMO-LOC PURPLE LG 544240

## (undated) DEVICE — DAVINCI XI MONOPOLAR SCISSORS HOT SHEARS 8MM 470179

## (undated) DEVICE — DRAPE MAYO STAND 23X54 8337

## (undated) DEVICE — GOWN IMPERVIOUS SPECIALTY XLG/XLONG 32474

## (undated) DEVICE — CATH TRAY FOLEY SURESTEP 16FR WDRAIN BAG STLK LATEX A300316A

## (undated) DEVICE — STRAP UNIVERSAL POSITIONING 2-PIECE 4X47X76" 91-287

## (undated) DEVICE — NDL INSUFFLATION 13GA 120MM C2201

## (undated) DEVICE — DAVINCI XI DRAPE ARM 470015

## (undated) DEVICE — DAVINCI XI ESU FORCE BIPOLAR 8MM 471405

## (undated) DEVICE — LINEN DRAPE 54X72" 5467

## (undated) DEVICE — LINEN GOWN X4 5410

## (undated) DEVICE — DRSG TELFA 3X8" 1238

## (undated) DEVICE — SU MONOCRYL 4-0 PS-2 18" UND Y496G

## (undated) DEVICE — DRAPE IOBAN INCISE 23X17" 6650EZ

## (undated) DEVICE — Device

## (undated) DEVICE — TUBING CONMED AIRSEAL SMOKE EVAC INSUFFLATION ASM-EVAC

## (undated) DEVICE — TAPE DURAPORE 3" SILK 1538-3

## (undated) DEVICE — KIT PATIENT POSITIONING PINK PAD EXT 40601

## (undated) DEVICE — SUCTION IRR STRYKERFLOW II W/TIP 250-070-520

## (undated) DEVICE — SYSTEM LAPAROVUE VISIBILITY LAPVUE10

## (undated) DEVICE — SU WND CLOSURE VLOC 180 ABS 2-0 12" GS-21 VLOCL0315

## (undated) DEVICE — SPECIMEN CONTAINER 5OZ STERILE 2600SA

## (undated) DEVICE — COVER CAMERA IN-LIGHT DISP LT-C02

## (undated) DEVICE — DAVINCI XI DRAPE COLUMN 470341

## (undated) DEVICE — TUBING SUCTION MEDI-VAC 1/4"X20' N620A

## (undated) DEVICE — DAVINCI HOT SHEARS TIP COVER  400180

## (undated) DEVICE — ENDO POUCH UNIV RETRIEVAL SYSTEM INZII 10MM CD001

## (undated) DEVICE — SYR 20ML LL W/O NDL 302830

## (undated) DEVICE — ENDO TROCAR CONMED AIRSEAL BLADELESS 12X120MM IAS12-120LP

## (undated) DEVICE — BLADE SWITCH SCISSORS TIP 5MM 89-5100B

## (undated) DEVICE — DAVINCI XI SEAL UNIVERSAL 5-8MM 470361

## (undated) DEVICE — GLOVE PROTEXIS W/NEU-THERA 7.5  2D73TE75

## (undated) DEVICE — DRAPE U SPLIT 74X120" 29440

## (undated) DEVICE — DRAPE TIBURON TOP SHEET 100X60" 29352

## (undated) DEVICE — DRAPE WARMER 66X44" ORS-300

## (undated) DEVICE — LINEN TOWEL PACK X5 5464

## (undated) DEVICE — ADH SKIN CLOSURE PREMIERPRO EXOFIN 1.0ML 3470

## (undated) DEVICE — SOL NACL 0.9% INJ 1000ML BAG 2B1324X

## (undated) DEVICE — PREP CHLORAPREP 26ML TINTED HI-LITE ORANGE 930815

## (undated) DEVICE — SU VICRYL 0 UR-6 27" J603H

## (undated) DEVICE — LINEN ORTHO PACK 5446

## (undated) RX ORDER — ONDANSETRON 2 MG/ML
INJECTION INTRAMUSCULAR; INTRAVENOUS
Status: DISPENSED
Start: 2022-04-29

## (undated) RX ORDER — CEFAZOLIN SODIUM/WATER 2 G/20 ML
SYRINGE (ML) INTRAVENOUS
Status: DISPENSED
Start: 2022-04-29

## (undated) RX ORDER — DEXAMETHASONE SODIUM PHOSPHATE 4 MG/ML
INJECTION, SOLUTION INTRA-ARTICULAR; INTRALESIONAL; INTRAMUSCULAR; INTRAVENOUS; SOFT TISSUE
Status: DISPENSED
Start: 2022-04-29

## (undated) RX ORDER — ACETAMINOPHEN 325 MG/1
TABLET ORAL
Status: DISPENSED
Start: 2022-04-29

## (undated) RX ORDER — FENTANYL CITRATE 50 UG/ML
INJECTION, SOLUTION INTRAMUSCULAR; INTRAVENOUS
Status: DISPENSED
Start: 2022-04-29

## (undated) RX ORDER — EPHEDRINE SULFATE 50 MG/ML
INJECTION, SOLUTION INTRAMUSCULAR; INTRAVENOUS; SUBCUTANEOUS
Status: DISPENSED
Start: 2022-04-29

## (undated) RX ORDER — ROCURONIUM BROMIDE 50 MG/5 ML
SYRINGE (ML) INTRAVENOUS
Status: DISPENSED
Start: 2022-04-29

## (undated) RX ORDER — PROPOFOL 10 MG/ML
INJECTION, EMULSION INTRAVENOUS
Status: DISPENSED
Start: 2022-04-29

## (undated) RX ORDER — HYDROMORPHONE HYDROCHLORIDE 1 MG/ML
INJECTION, SOLUTION INTRAMUSCULAR; INTRAVENOUS; SUBCUTANEOUS
Status: DISPENSED
Start: 2022-04-29

## (undated) RX ORDER — LIDOCAINE HYDROCHLORIDE 20 MG/ML
INJECTION, SOLUTION EPIDURAL; INFILTRATION; INTRACAUDAL; PERINEURAL
Status: DISPENSED
Start: 2022-04-29

## (undated) RX ORDER — BUPIVACAINE HYDROCHLORIDE 2.5 MG/ML
INJECTION, SOLUTION EPIDURAL; INFILTRATION; INTRACAUDAL
Status: DISPENSED
Start: 2022-04-29